# Patient Record
Sex: FEMALE | Race: WHITE | NOT HISPANIC OR LATINO | URBAN - METROPOLITAN AREA
[De-identification: names, ages, dates, MRNs, and addresses within clinical notes are randomized per-mention and may not be internally consistent; named-entity substitution may affect disease eponyms.]

---

## 2017-01-12 ENCOUNTER — APPOINTMENT (RX ONLY)
Dept: URBAN - METROPOLITAN AREA CLINIC 24 | Facility: CLINIC | Age: 82
Setting detail: DERMATOLOGY
End: 2017-01-12

## 2017-01-12 DIAGNOSIS — L57.0 ACTINIC KERATOSIS: ICD-10-CM

## 2017-01-12 PROCEDURE — ? LIQUID NITROGEN

## 2017-01-12 PROCEDURE — 17000 DESTRUCT PREMALG LESION: CPT

## 2017-01-12 ASSESSMENT — LOCATION ZONE DERM: LOCATION ZONE: EAR

## 2017-01-12 ASSESSMENT — LOCATION SIMPLE DESCRIPTION DERM: LOCATION SIMPLE: RIGHT EAR

## 2017-01-12 ASSESSMENT — LOCATION DETAILED DESCRIPTION DERM: LOCATION DETAILED: RIGHT INFERIOR HELIX

## 2017-01-12 NOTE — PROCEDURE: MIPS QUALITY
Detail Level: Detailed
Quality 110: Preventive Care And Screening: Influenza Immunization: Influenza Immunization previously received during influenza season
Quality 111:Pneumonia Vaccination Status For Older Adults: Pneumococcal Vaccination Previously Received
Quality 130: Documentation Of Current Medications In The Medical Record: Current Medications Documented

## 2017-01-17 ENCOUNTER — HOSPITAL ENCOUNTER (EMERGENCY)
Age: 82
Discharge: HOME | End: 2017-01-17
Attending: EMERGENCY MEDICINE
Payer: MEDICARE

## 2017-01-17 VITALS
HEART RATE: 50 BPM | DIASTOLIC BLOOD PRESSURE: 55 MMHG | OXYGEN SATURATION: 99 % | BODY MASS INDEX: 31.02 KG/M2 | RESPIRATION RATE: 16 BRPM | SYSTOLIC BLOOD PRESSURE: 111 MMHG | WEIGHT: 193 LBS | HEIGHT: 66 IN

## 2017-01-17 DIAGNOSIS — E87.6 HYPOKALEMIA: ICD-10-CM

## 2017-01-17 DIAGNOSIS — I48.0 PAROXYSMAL ATRIAL FIBRILLATION (HCC): ICD-10-CM

## 2017-01-17 DIAGNOSIS — R00.2 PALPITATIONS: Primary | ICD-10-CM

## 2017-01-17 LAB
ALBUMIN SERPL BCP-MCNC: 3.8 G/DL (ref 3.2–4.6)
ALBUMIN/GLOB SERPL: 1.2 {RATIO} (ref 1.2–3.5)
ALP SERPL-CCNC: 82 U/L (ref 50–136)
ALT SERPL-CCNC: 38 U/L (ref 12–65)
ANION GAP BLD CALC-SCNC: 6 MMOL/L (ref 7–16)
AST SERPL W P-5'-P-CCNC: 31 U/L (ref 15–37)
ATRIAL RATE: 58 BPM
BASOPHILS # BLD AUTO: 0 K/UL (ref 0–0.2)
BASOPHILS # BLD: 0 % (ref 0–2)
BILIRUB SERPL-MCNC: 0.7 MG/DL (ref 0.2–1.1)
BUN SERPL-MCNC: 18 MG/DL (ref 8–23)
CALCIUM SERPL-MCNC: 9.4 MG/DL (ref 8.3–10.4)
CALCULATED P AXIS, ECG09: 65 DEGREES
CALCULATED R AXIS, ECG10: 53 DEGREES
CALCULATED T AXIS, ECG11: 41 DEGREES
CHLORIDE SERPL-SCNC: 102 MMOL/L (ref 98–107)
CO2 SERPL-SCNC: 32 MMOL/L (ref 21–32)
CREAT SERPL-MCNC: 0.95 MG/DL (ref 0.6–1)
DIAGNOSIS, 93000: NORMAL
DIASTOLIC BP, ECG02: NORMAL MMHG
DIFFERENTIAL METHOD BLD: ABNORMAL
EOSINOPHIL # BLD: 0.2 K/UL (ref 0–0.8)
EOSINOPHIL NFR BLD: 4 % (ref 0.5–7.8)
ERYTHROCYTE [DISTWIDTH] IN BLOOD BY AUTOMATED COUNT: 14.4 % (ref 11.9–14.6)
GLOBULIN SER CALC-MCNC: 3.2 G/DL (ref 2.3–3.5)
GLUCOSE SERPL-MCNC: 89 MG/DL (ref 65–100)
HCT VFR BLD AUTO: 41.8 % (ref 35.8–46.3)
HGB BLD-MCNC: 13.8 G/DL (ref 11.7–15.4)
IMM GRANULOCYTES # BLD: 0 K/UL (ref 0–0.5)
IMM GRANULOCYTES NFR BLD AUTO: 0 % (ref 0–5)
INR PPP: 3 (ref 0.9–1.2)
LYMPHOCYTES # BLD AUTO: 24 % (ref 13–44)
LYMPHOCYTES # BLD: 1.2 K/UL (ref 0.5–4.6)
MAGNESIUM SERPL-MCNC: 1.7 MG/DL (ref 1.8–2.4)
MCH RBC QN AUTO: 29.3 PG (ref 26.1–32.9)
MCHC RBC AUTO-ENTMCNC: 33 G/DL (ref 31.4–35)
MCV RBC AUTO: 88.7 FL (ref 79.6–97.8)
MONOCYTES # BLD: 0.3 K/UL (ref 0.1–1.3)
MONOCYTES NFR BLD AUTO: 6 % (ref 4–12)
NEUTS SEG # BLD: 3.2 K/UL (ref 1.7–8.2)
NEUTS SEG NFR BLD AUTO: 66 % (ref 43–78)
P-R INTERVAL, ECG05: 122 MS
PLATELET # BLD AUTO: 176 K/UL (ref 150–450)
PMV BLD AUTO: 10.4 FL (ref 10.8–14.1)
POTASSIUM SERPL-SCNC: 3.4 MMOL/L (ref 3.5–5.1)
PROT SERPL-MCNC: 7 G/DL (ref 6.3–8.2)
PROTHROMBIN TIME: 31.5 SEC (ref 9.6–12)
Q-T INTERVAL, ECG07: 412 MS
QRS DURATION, ECG06: 94 MS
QTC CALCULATION (BEZET), ECG08: 404 MS
RBC # BLD AUTO: 4.71 M/UL (ref 4.05–5.25)
SODIUM SERPL-SCNC: 140 MMOL/L (ref 136–145)
SYSTOLIC BP, ECG01: NORMAL MMHG
TROPONIN I BLD-MCNC: 0 NG/ML (ref 0–0.08)
VENTRICULAR RATE, ECG03: 58 BPM
WBC # BLD AUTO: 4.9 K/UL (ref 4.3–11.1)

## 2017-01-17 PROCEDURE — 80053 COMPREHEN METABOLIC PANEL: CPT | Performed by: EMERGENCY MEDICINE

## 2017-01-17 PROCEDURE — 85025 COMPLETE CBC W/AUTO DIFF WBC: CPT | Performed by: EMERGENCY MEDICINE

## 2017-01-17 PROCEDURE — 99284 EMERGENCY DEPT VISIT MOD MDM: CPT | Performed by: EMERGENCY MEDICINE

## 2017-01-17 PROCEDURE — 84484 ASSAY OF TROPONIN QUANT: CPT

## 2017-01-17 PROCEDURE — 83735 ASSAY OF MAGNESIUM: CPT | Performed by: EMERGENCY MEDICINE

## 2017-01-17 PROCEDURE — 85610 PROTHROMBIN TIME: CPT | Performed by: EMERGENCY MEDICINE

## 2017-01-17 PROCEDURE — 93005 ELECTROCARDIOGRAM TRACING: CPT | Performed by: EMERGENCY MEDICINE

## 2017-01-17 PROCEDURE — 74011250637 HC RX REV CODE- 250/637: Performed by: EMERGENCY MEDICINE

## 2017-01-17 RX ORDER — POTASSIUM CHLORIDE 20MEQ/15ML
40 LIQUID (ML) ORAL
Status: COMPLETED | OUTPATIENT
Start: 2017-01-17 | End: 2017-01-17

## 2017-01-17 RX ORDER — LANOLIN ALCOHOL/MO/W.PET/CERES
400 CREAM (GRAM) TOPICAL ONCE
Status: COMPLETED | OUTPATIENT
Start: 2017-01-17 | End: 2017-01-17

## 2017-01-17 RX ADMIN — Medication 400 MG: at 07:17

## 2017-01-17 RX ADMIN — POTASSIUM CHLORIDE 40 MEQ: 20 SOLUTION ORAL at 07:15

## 2017-01-17 NOTE — ED PROVIDER NOTES
HPI Comments: 73-year-old female woke to go to the restroom for clot. Upon returning to bed she felt palpitations and irregular heartbeat. No chest pain no lightheadedness no vomiting no syncope. History of paroxysmal atrial fibrillation and this feels similar to past episodes. Last episode was probably 3 years ago. She has been maintained on Coumadin. Patient is a 80 y.o. female presenting with palpitations. The history is provided by the patient. Palpitations    This is a new problem. The current episode started 1 to 2 hours ago. The problem has been gradually improving. The problem occurs constantly. On average, each episode lasts 2 hours. Associated symptoms include irregular heartbeat. Pertinent negatives include no diaphoresis, no fever, no malaise/fatigue, no chest pain, no near-syncope, no syncope, no abdominal pain, no nausea, no vomiting, no headaches, no back pain, no weakness, no cough and no shortness of breath. Risk factors include hypertension. Past Medical History:   Diagnosis Date    Chest pain 10/30/2015    Dyspnea     Headache     HTN (hypertension), benign, controlled 10/30/2015    Malaise and fatigue     Paroxysmal atrial fibrillation ([facility] [address] 10/30/2015    Preoperative cardiovascular examination        Past Surgical History:   Procedure Laterality Date    Pr appendectomy      Hx hysterectomy      Hx breast reduction      Pr lap,cholecystectomy           Family History:   Problem Relation Age of Onset    Heart Attack Brother     Diabetes Mother     Cancer Mother      non hog    Diabetes Brother     Cancer Father      liver    Cancer Brother      prostate       Social History     Social History    Marital status:      Spouse name: N/A    Number of children: N/A    Years of education: N/A     Occupational History    Not on file.      Social History Main Topics    Smoking status: Former Smoker     Packs/day: 1.00     Years: 20.00    Smokeless tobacco: Not on file    Alcohol use No    Drug use: Not on file    Sexual activity: Not on file     Other Topics Concern    Not on file     Social History Narrative         ALLERGIES: Adhesive tape-silicones; Aloe vera; Ampicillin; Morphine; Penicillins; Reglan [metoclopramide]; Septra [sulfamethoprim]; and Sulfa (sulfonamide antibiotics)    Review of Systems   Constitutional: Negative for chills, diaphoresis, fever and malaise/fatigue. Respiratory: Negative for cough and shortness of breath. Cardiovascular: Positive for palpitations. Negative for chest pain, syncope and near-syncope. Gastrointestinal: Negative for abdominal pain, diarrhea, nausea and vomiting. Genitourinary: Negative for dysuria and flank pain. Musculoskeletal: Negative for back pain and neck pain. Skin: Negative for color change and rash. Neurological: Negative for syncope, weakness and headaches. All other systems reviewed and are negative. Vitals:    01/17/17 0604   BP: (!) 155/98   Pulse: 63   Resp: 18   SpO2: 97%   Weight: 87.5 kg (193 lb)   Height: 5' 6\" (1.676 m)            Physical Exam   Constitutional: She is oriented to person, place, and time. She appears well-developed and well-nourished. No distress. HENT:   Head: Normocephalic and atraumatic. Mouth/Throat: Oropharynx is clear and moist. No oropharyngeal exudate. Eyes: Conjunctivae and EOM are normal. Pupils are equal, round, and reactive to light. Neck: Normal range of motion. Neck supple. Cardiovascular: Normal rate, regular rhythm and intact distal pulses. No murmur heard. Pulmonary/Chest: Breath sounds normal. No respiratory distress. Abdominal: No hernia. Neurological: She is alert and oriented to person, place, and time. Gait normal.   Nl speech   Skin: Skin is warm and dry. Psychiatric: She has a normal mood and affect. Her speech is normal.   Nursing note and vitals reviewed.        MDM  Number of Diagnoses or Management Options  Diagnosis management comments: Assessment EKG for dysrhythmia. Assess electrolytes. Serum troponin. Check PT level since patient is on Coumadin. Amount and/or Complexity of Data Reviewed  Clinical lab tests: ordered and reviewed  Tests in the radiology section of CPT®: ordered and reviewed  Tests in the medicine section of CPT®: reviewed and ordered  Independent visualization of images, tracings, or specimens: yes    Risk of Complications, Morbidity, and/or Mortality  Presenting problems: moderate  Diagnostic procedures: low  Management options: moderate    Patient Progress  Patient progress: stable    ED Course       Procedures    EKG shows normal sinus rhythm ST-T changes or ectopy. Results Include:    Recent Results (from the past 24 hour(s))   EKG, 12 LEAD, INITIAL    Collection Time: 01/17/17  6:05 AM   Result Value Ref Range    Systolic BP  mmHg    Diastolic BP  mmHg    Ventricular Rate 58 BPM    Atrial Rate 58 BPM    P-R Interval 122 ms    QRS Duration 94 ms    Q-T Interval 412 ms    QTC Calculation (Bezet) 404 ms    Calculated P Axis 65 degrees    Calculated R Axis 53 degrees    Calculated T Axis 41 degrees    Diagnosis       Sinus bradycardia  Otherwise normal ECG  Confirmed by Yady Guerrero MD (), ROSEANNE MARTINEZ ([id number]) on 1/17/2017 6:55:55 AM     CBC WITH AUTOMATED DIFF    Collection Time: 01/17/17  6:12 AM   Result Value Ref Range    WBC 4.9 4.3 - 11.1 K/uL    RBC 4.71 4.05 - 5.25 M/uL    HGB 13.8 11.7 - 15.4 g/dL    HCT 41.8 35.8 - 46.3 %    MCV 88.7 79.6 - 97.8 FL    MCH 29.3 26.1 - 32.9 PG    MCHC 33.0 31.4 - 35.0 g/dL    RDW 14.4 11.9 - 14.6 %    PLATELET 657 468 - 818 K/uL    MPV 10.4 (L) 10.8 - 14.1 FL    DF AUTOMATED      NEUTROPHILS 66 43 - 78 %    LYMPHOCYTES 24 13 - 44 %    MONOCYTES 6 4.0 - 12.0 %    EOSINOPHILS 4 0.5 - 7.8 %    BASOPHILS 0 0.0 - 2.0 %    IMMATURE GRANULOCYTES 0.0 0.0 - 5.0 %    ABS. NEUTROPHILS 3.2 1.7 - 8.2 K/UL    ABS. LYMPHOCYTES 1.2 0.5 - 4.6 K/UL    ABS.  MONOCYTES 0.3 0.1 - 1.3 K/UL    ABS. EOSINOPHILS 0.2 0.0 - 0.8 K/UL    ABS. BASOPHILS 0.0 0.0 - 0.2 K/UL    ABS. IMM. GRANS. 0.0 0.0 - 0.5 K/UL   METABOLIC PANEL, COMPREHENSIVE    Collection Time: 01/17/17  6:12 AM   Result Value Ref Range    Sodium 140 136 - 145 mmol/L    Potassium 3.4 (L) 3.5 - 5.1 mmol/L    Chloride 102 98 - 107 mmol/L    CO2 32 21 - 32 mmol/L    Anion gap 6 (L) 7 - 16 mmol/L    Glucose 89 65 - 100 mg/dL    BUN 18 8 - 23 MG/DL    Creatinine 0.95 0.6 - 1.0 MG/DL    GFR est AA >60 >60 ml/min/1.73m2    GFR est non-AA >60 >60 ml/min/1.73m2    Calcium 9.4 8.3 - 10.4 MG/DL    Bilirubin, total 0.7 0.2 - 1.1 MG/DL    ALT 38 12 - 65 U/L    AST 31 15 - 37 U/L    Alk. phosphatase 82 50 - 136 U/L    Protein, total 7.0 6.3 - 8.2 g/dL    Albumin 3.8 3.2 - 4.6 g/dL    Globulin 3.2 2.3 - 3.5 g/dL    A-G Ratio 1.2 1.2 - 3.5     MAGNESIUM    Collection Time: 01/17/17  6:12 AM   Result Value Ref Range    Magnesium 1.7 (L) 1.8 - 2.4 mg/dL   PROTHROMBIN TIME + INR    Collection Time: 01/17/17  6:12 AM   Result Value Ref Range    Prothrombin time 31.5 (H) 9.6 - 12.0 sec    INR 3.0 (H) 0.9 - 1.2     POC TROPONIN-I    Collection Time: 01/17/17  6:41 AM   Result Value Ref Range    Troponin-I (POC) 0 0.0 - 0.08 ng/ml     No ectopy in the department.

## 2017-01-17 NOTE — ED TRIAGE NOTES
Pt presents to ER for sensation of irregular heart beat w/ rate increase to 109 at home since 0430 this am after getting up to the bathroom,  Pt denies SOB or CP, feels dizzy.

## 2017-01-17 NOTE — DISCHARGE INSTRUCTIONS
[facility]. Call for appointment if you do not hear from them in 2 days. Recheck sooner for worse palpitations/fever/vomiting/breathing. Results Include:    Recent Results (from the past 24 hour(s))   EKG, 12 LEAD, INITIAL    Collection Time: 01/17/17  6:05 AM   Result Value Ref Range    Systolic BP  mmHg    Diastolic BP  mmHg    Ventricular Rate 58 BPM    Atrial Rate 58 BPM    P-R Interval 122 ms    QRS Duration 94 ms    Q-T Interval 412 ms    QTC Calculation (Bezet) 404 ms    Calculated P Axis 65 degrees    Calculated R Axis 53 degrees    Calculated T Axis 41 degrees    Diagnosis       Sinus bradycardia  Otherwise normal ECG  Confirmed by Shyla Tillman MD (), ROSEANNE MARTINEZ ([id number]) on 1/17/2017 6:55:55 AM     CBC WITH AUTOMATED DIFF    Collection Time: 01/17/17  6:12 AM   Result Value Ref Range    WBC 4.9 4.3 - 11.1 K/uL    RBC 4.71 4.05 - 5.25 M/uL    HGB 13.8 11.7 - 15.4 g/dL    HCT 41.8 35.8 - 46.3 %    MCV 88.7 79.6 - 97.8 FL    MCH 29.3 26.1 - 32.9 PG    MCHC 33.0 31.4 - 35.0 g/dL    RDW 14.4 11.9 - 14.6 %    PLATELET 373 938 - 387 K/uL    MPV 10.4 (L) 10.8 - 14.1 FL    DF AUTOMATED      NEUTROPHILS 66 43 - 78 %    LYMPHOCYTES 24 13 - 44 %    MONOCYTES 6 4.0 - 12.0 %    EOSINOPHILS 4 0.5 - 7.8 %    BASOPHILS 0 0.0 - 2.0 %    IMMATURE GRANULOCYTES 0.0 0.0 - 5.0 %    ABS. NEUTROPHILS 3.2 1.7 - 8.2 K/UL    ABS. LYMPHOCYTES 1.2 0.5 - 4.6 K/UL    ABS. MONOCYTES 0.3 0.1 - 1.3 K/UL    ABS. EOSINOPHILS 0.2 0.0 - 0.8 K/UL    ABS. BASOPHILS 0.0 0.0 - 0.2 K/UL    ABS. IMM.  GRANS. 0.0 0.0 - 0.5 K/UL   METABOLIC PANEL, COMPREHENSIVE    Collection Time: 01/17/17  6:12 AM   Result Value Ref Range    Sodium 140 136 - 145 mmol/L    Potassium 3.4 (L) 3.5 - 5.1 mmol/L    Chloride 102 98 - 107 mmol/L    CO2 32 21 - 32 mmol/L    Anion gap 6 (L) 7 - 16 mmol/L    Glucose 89 65 - 100 mg/dL    BUN 18 8 - 23 MG/DL    Creatinine 0.95 0.6 - 1.0 MG/DL    GFR est AA >60 >60 ml/min/1.73m2    GFR est non-AA >60 >60 ml/min/1.73m2    Calcium 9.4 8.3 - 10.4 MG/DL    Bilirubin, total 0.7 0.2 - 1.1 MG/DL    ALT 38 12 - 65 U/L    AST 31 15 - 37 U/L    Alk. phosphatase 82 50 - 136 U/L    Protein, total 7.0 6.3 - 8.2 g/dL    Albumin 3.8 3.2 - 4.6 g/dL    Globulin 3.2 2.3 - 3.5 g/dL    A-G Ratio 1.2 1.2 - 3.5     MAGNESIUM    Collection Time: 01/17/17  6:12 AM   Result Value Ref Range    Magnesium 1.7 (L) 1.8 - 2.4 mg/dL   PROTHROMBIN TIME + INR    Collection Time: 01/17/17  6:12 AM   Result Value Ref Range    Prothrombin time 31.5 (H) 9.6 - 12.0 sec    INR 3.0 (H) 0.9 - 1.2     POC TROPONIN-I    Collection Time: 01/17/17  6:41 AM   Result Value Ref Range    Troponin-I (POC) 0 0.0 - 0.08 ng/ml            Palpitations: Care Instructions  Your Care Instructions    Heart palpitations are the uncomfortable sensation that your heart is beating fast or irregularly. You might feel pounding or fluttering in your chest. It might feel like your heart is skipping a beat. Although palpitations may be caused by a heart problem, they also occur because of stress, fatigue, or use of alcohol, caffeine, or nicotine. Many medicines, including diet pills, antihistamines, decongestants, and some herbal products, can cause heart palpitations. Nearly everyone has palpitations from time to time. Depending on your symptoms, your doctor may need to do more tests to try to find the cause of your palpitations. Follow-up care is a key part of your treatment and safety. Be sure to make and go to all appointments, and call your doctor if you are having problems. It's also a good idea to know your test results and keep a list of the medicines you take. How can you care for yourself at home? · Avoid caffeine, nicotine, and excess alcohol. · Do not take illegal drugs, such as methamphetamines and cocaine. · Do not take weight loss or diet medicines unless you talk with your doctor first.  · Get plenty of sleep. · Do not overeat.   · If you have palpitations again, take deep breaths and try to relax. This may slow a racing heart. · If you start to feel lightheaded, lie down to avoid injuries that might result if you pass out and fall down. · Keep a record of your palpitations and bring it to your next doctor's appointment. Write down:  ¨ The date and time. ¨ Your pulse. (If your heart is beating fast, it may be hard to count your pulse.)  ¨ What you were doing when the palpitations started. ¨ How long the palpitations lasted. ¨ Any other symptoms. · If an activity causes palpitations, slow down or stop. Talk to your doctor before you do that activity again. · Take your medicines exactly as prescribed. Call your doctor if you think you are having a problem with your medicine. When should you call for help? Call 911 anytime you think you may need emergency care. For example, call if:  · You passed out (lost consciousness). · You have symptoms of a heart attack. These may include:  ¨ Chest pain or pressure, or a strange feeling in the chest.  ¨ Sweating. ¨ Shortness of breath. ¨ Pain, pressure, or a strange feeling in the back, neck, jaw, or upper belly or in one or both shoulders or arms. ¨ Lightheadedness or sudden weakness. ¨ A fast or irregular heartbeat. After you call 911, the  may tell you to chew 1 adult-strength or 2 to 4 low-dose aspirin. Wait for an ambulance. Do not try to drive yourself. · You have symptoms of a stroke. These may include:  ¨ Sudden numbness, tingling, weakness, or loss of movement in your face, arm, or leg, especially on only one side of your body. ¨ Sudden vision changes. ¨ Sudden trouble speaking. ¨ Sudden confusion or trouble understanding simple statements. ¨ Sudden problems with walking or balance. ¨ A sudden, severe headache that is different from past headaches.   Call your doctor now or seek immediate medical care if:  · You have heart palpitations and:  ¨ Are dizzy or lightheaded, or you feel like you may faint.  ¨ Have new or increased shortness of breath. Watch closely for changes in your health, and be sure to contact your doctor if:  · You continue to have heart palpitations. Where can you learn more? Go to http://logan-pankaj.info/. Enter R508 in the search box to learn more about \"Palpitations: Care Instructions. \"  Current as of: January 27, 2016  Content Version: 11.1  © 8615-0721 [employer]. Care instructions adapted under license by [employer] (which disclaims liability or warranty for this information). If you have questions about a medical condition or this instruction, always ask your healthcare professional. [place] [address] any warranty or liability for your use of this information.

## 2017-02-07 PROBLEM — Z01.810 PREOP CARDIOVASCULAR EXAM: Status: ACTIVE | Noted: 2017-02-07

## 2017-05-18 ENCOUNTER — APPOINTMENT (RX ONLY)
Dept: URBAN - METROPOLITAN AREA CLINIC 24 | Facility: CLINIC | Age: 82
Setting detail: DERMATOLOGY
End: 2017-05-18

## 2017-05-18 DIAGNOSIS — Z87.2 PERSONAL HISTORY OF DISEASES OF THE SKIN AND SUBCUTANEOUS TISSUE: ICD-10-CM

## 2017-05-18 DIAGNOSIS — L81.4 OTHER MELANIN HYPERPIGMENTATION: ICD-10-CM

## 2017-05-18 DIAGNOSIS — L57.0 ACTINIC KERATOSIS: ICD-10-CM

## 2017-05-18 DIAGNOSIS — D18.0 HEMANGIOMA: ICD-10-CM

## 2017-05-18 PROBLEM — M12.9 ARTHROPATHY, UNSPECIFIED: Status: ACTIVE | Noted: 2017-05-18

## 2017-05-18 PROBLEM — E03.9 HYPOTHYROIDISM, UNSPECIFIED: Status: ACTIVE | Noted: 2017-05-18

## 2017-05-18 PROBLEM — D18.01 HEMANGIOMA OF SKIN AND SUBCUTANEOUS TISSUE: Status: ACTIVE | Noted: 2017-05-18

## 2017-05-18 PROBLEM — I10 ESSENTIAL (PRIMARY) HYPERTENSION: Status: ACTIVE | Noted: 2017-05-18

## 2017-05-18 PROCEDURE — 17000 DESTRUCT PREMALG LESION: CPT

## 2017-05-18 PROCEDURE — 99214 OFFICE O/P EST MOD 30 MIN: CPT | Mod: 25

## 2017-05-18 PROCEDURE — ? COUNSELING

## 2017-05-18 PROCEDURE — 17003 DESTRUCT PREMALG LES 2-14: CPT

## 2017-05-18 PROCEDURE — ? LIQUID NITROGEN

## 2017-05-18 ASSESSMENT — LOCATION ZONE DERM
LOCATION ZONE: TRUNK
LOCATION ZONE: NOSE
LOCATION ZONE: NECK
LOCATION ZONE: FACE
LOCATION ZONE: EAR
LOCATION ZONE: HAND

## 2017-05-18 ASSESSMENT — LOCATION DETAILED DESCRIPTION DERM
LOCATION DETAILED: LEFT NASAL DORSUM
LOCATION DETAILED: LEFT INFERIOR CENTRAL MALAR CHEEK
LOCATION DETAILED: LEFT SUPERIOR FOREHEAD
LOCATION DETAILED: LEFT RADIAL DORSAL HAND
LOCATION DETAILED: EPIGASTRIC SKIN
LOCATION DETAILED: RIGHT CLAVICULAR SKIN
LOCATION DETAILED: RIGHT SUPERIOR MEDIAL UPPER BACK
LOCATION DETAILED: RIGHT RADIAL DORSAL HAND
LOCATION DETAILED: RIGHT INFERIOR UPPER BACK
LOCATION DETAILED: UPPER STERNUM
LOCATION DETAILED: RIGHT SUPERIOR HELIX
LOCATION DETAILED: MID TRAPEZIAL NECK
LOCATION DETAILED: RIGHT SUPERIOR CENTRAL MALAR CHEEK

## 2017-05-18 ASSESSMENT — LOCATION SIMPLE DESCRIPTION DERM
LOCATION SIMPLE: LEFT CHEEK
LOCATION SIMPLE: NOSE
LOCATION SIMPLE: CHEST
LOCATION SIMPLE: ABDOMEN
LOCATION SIMPLE: RIGHT EAR
LOCATION SIMPLE: RIGHT HAND
LOCATION SIMPLE: TRAPEZIAL NECK
LOCATION SIMPLE: RIGHT CLAVICULAR SKIN
LOCATION SIMPLE: RIGHT UPPER BACK
LOCATION SIMPLE: LEFT FOREHEAD
LOCATION SIMPLE: RIGHT CHEEK
LOCATION SIMPLE: LEFT HAND

## 2017-08-09 PROBLEM — Z79.01 ANTICOAGULANT LONG-TERM USE: Status: ACTIVE | Noted: 2017-08-09

## 2017-10-24 ENCOUNTER — APPOINTMENT (RX ONLY)
Dept: URBAN - METROPOLITAN AREA CLINIC 24 | Facility: CLINIC | Age: 82
Setting detail: DERMATOLOGY
End: 2017-10-24

## 2017-10-24 DIAGNOSIS — D485 NEOPLASM OF UNCERTAIN BEHAVIOR OF SKIN: ICD-10-CM

## 2017-10-24 DIAGNOSIS — H61.03 CHONDRITIS OF EXTERNAL EAR: ICD-10-CM

## 2017-10-24 PROBLEM — D48.5 NEOPLASM OF UNCERTAIN BEHAVIOR OF SKIN: Status: ACTIVE | Noted: 2017-10-24

## 2017-10-24 PROBLEM — H61.032 CHONDRITIS OF LEFT EXTERNAL EAR: Status: ACTIVE | Noted: 2017-10-24

## 2017-10-24 PROBLEM — F41.9 ANXIETY DISORDER, UNSPECIFIED: Status: ACTIVE | Noted: 2017-10-24

## 2017-10-24 PROCEDURE — ? BIOPSY BY SHAVE METHOD

## 2017-10-24 PROCEDURE — 69100 BIOPSY OF EXTERNAL EAR: CPT

## 2017-10-24 PROCEDURE — ? COUNSELING

## 2017-10-24 PROCEDURE — 99212 OFFICE O/P EST SF 10 MIN: CPT | Mod: 25

## 2017-10-24 ASSESSMENT — LOCATION SIMPLE DESCRIPTION DERM
LOCATION SIMPLE: LEFT EAR
LOCATION SIMPLE: RIGHT EAR

## 2017-10-24 ASSESSMENT — LOCATION DETAILED DESCRIPTION DERM
LOCATION DETAILED: LEFT SUPERIOR HELIX
LOCATION DETAILED: RIGHT INFERIOR HELIX

## 2017-10-24 ASSESSMENT — LOCATION ZONE DERM: LOCATION ZONE: EAR

## 2017-10-24 NOTE — PROCEDURE: BIOPSY BY SHAVE METHOD
X Size Of Lesion In Cm: 0
Destruction After The Procedure: No
Hemostasis: Aluminum Chloride
Electrodesiccation And Curettage Text: The wound bed was treated with electrodesiccation and curettage after the biopsy was performed.
Biopsy Method: Dermablade
Anesthesia Volume In Cc: 0.3
Silver Nitrate Text: The wound bed was treated with silver nitrate after the biopsy was performed.
Cryotherapy Text: The wound bed was treated with cryotherapy after the biopsy was performed.
Consent: Written consent was obtained and risks were reviewed including but not limited to scarring, infection, bleeding, scabbing, incomplete removal, and allergy to anesthesia.
Type Of Destruction Used: Curettage
Detail Level: Detailed
Wound Care: Vaseline
Anesthesia Type: 1% lidocaine with epinephrine
Dressing: bandage
Notification Instructions: Patient will be notified of biopsy results. However, patient instructed to call the office if not contacted within 2 weeks.
Billing Type: Third-Party Bill
Post-care instructions were reviewed in detail and written instructions are provided. Patient is to keep the biopsy site dry overnight, and then apply bacitracin twice daily until healed. Patient may apply hydrogen peroxide soaks to remove any crusting.
Biopsy Type: H and E
Path Notes (To The Dermatopathologist): Excise if positive
Accession #: [id number]
Electrodesiccation Text: The wound bed was treated with electrodesiccation after the biopsy was performed.

## 2017-12-12 ENCOUNTER — APPOINTMENT (RX ONLY)
Dept: URBAN - METROPOLITAN AREA CLINIC 24 | Facility: CLINIC | Age: 82
Setting detail: DERMATOLOGY
End: 2017-12-12

## 2017-12-12 PROBLEM — C44.222 SQUAMOUS CELL CARCINOMA OF SKIN OF RIGHT EAR AND EXTERNAL AURICULAR CANAL: Status: ACTIVE | Noted: 2017-12-12

## 2017-12-12 PROCEDURE — 12051 INTMD RPR FACE/MM 2.5 CM/<: CPT

## 2017-12-12 PROCEDURE — ? EXCISION

## 2017-12-12 PROCEDURE — 11641 EXC F/E/E/N/L MAL+MRG 0.6-1: CPT

## 2017-12-12 NOTE — PROCEDURE: EXCISION
Interpolation Flap Text: A decision was made to reconstruct the defect utilizing an interpolation axial flap and a staged reconstruction.  A telfa template was made of the defect.  This telfa template was then used to outline the interpolation flap.  The donor area for the pedicle flap was then injected with anesthesia.  The flap was excised through the skin and subcutaneous tissue down to the layer of the underlying musculature.  The interpolation flap was carefully excised within this deep plane to maintain its blood supply.  The edges of the donor site were undermined.   The donor site was closed in a primary fashion.  The pedicle was then rotated into position and sutured.  Once the tube was sutured into place, adequate blood supply was confirmed with blanching and refill.  The pedicle was then wrapped with xeroform gauze and dressed appropriately with a telfa and gauze bandage to ensure continued blood supply and protect the attached pedicle.
Suture Removal: 7 days
Epidermal Closure: running
Size Of Margin In Cm: 0.2
Complex Repair And Xenograft Text: The defect edges were debeveled with a #15 scalpel blade.  The primary defect was closed partially with a complex linear closure.  Given the location of the defect, shape of the defect and the proximity to free margins an tissue cultured epidermal autograft was deemed most appropriate to repair the remaining defect.  The graft was trimmed to fit the size of the remaining defect.  The graft was then placed in the primary defect, oriented appropriately, and sutured into place.
Complex Repair And V-Y Plasty Text: The defect edges were debeveled with a #15 scalpel blade.  The primary defect was closed partially with a complex linear closure.  Given the location of the remaining defect, shape of the defect and the proximity to free margins a V-Y plasty was deemed most appropriate for complete closure of the defect.  Using a sterile surgical marker, an appropriate advancement flap was drawn incorporating the defect and placing the expected incisions within the relaxed skin tension lines where possible.    The area thus outlined was incised deep to adipose tissue with a #15 scalpel blade.  The skin margins were undermined to an appropriate distance in all directions utilizing iris scissors.
Complex Repair And O-L Flap Text: The defect edges were debeveled with a #15 scalpel blade.  The primary defect was closed partially with a complex linear closure.  Given the location of the remaining defect, shape of the defect and the proximity to free margins an O-L flap was deemed most appropriate for complete closure of the defect.  Using a sterile surgical marker, an appropriate flap was drawn incorporating the defect and placing the expected incisions within the relaxed skin tension lines where possible.    The area thus outlined was incised deep to adipose tissue with a #15 scalpel blade.  The skin margins were undermined to an appropriate distance in all directions utilizing iris scissors.
O-T Advancement Flap Text: The defect edges were debeveled with a #15 scalpel blade.  Given the location of the defect, shape of the defect and the proximity to free margins an O-T advancement flap was deemed most appropriate.  Using a sterile surgical marker, an appropriate advancement flap was drawn incorporating the defect and placing the expected incisions within the relaxed skin tension lines where possible.    The area thus outlined was incised deep to adipose tissue with a #15 scalpel blade.  The skin margins were undermined to an appropriate distance in all directions utilizing iris scissors.
Render Path Notes In Note?: no
H Plasty Text: Given the location of the defect, shape of the defect and the proximity to free margins a H-plasty was deemed most appropriate for repair.  Using a sterile surgical marker, the appropriate advancement arms of the H-plasty were drawn incorporating the defect and placing the expected incisions within the relaxed skin tension lines where possible. The area thus outlined was incised deep to adipose tissue with a #15 scalpel blade. The skin margins were undermined to an appropriate distance in all directions utilizing iris scissors.  The opposing advancement arms were then advanced into place in opposite direction and anchored with interrupted buried subcutaneous sutures.
Complex Repair Preamble Text (Leave Blank If You Do Not Want): Extensive wide undermining was performed.
Saucerization Excision Additional Text (Leave Blank If You Do Not Want): The margin was drawn around the clinically apparent lesion.  Incisions were then made along these lines, in a tangential fashion, to the appropriate tissue plane and the lesion was extirpated.
Mastoid Interpolation Flap Text: A decision was made to reconstruct the defect utilizing an interpolation axial flap and a staged reconstruction.  A telfa template was made of the defect.  This telfa template was then used to outline the mastoid interpolation flap.  The donor area for the pedicle flap was then injected with anesthesia.  The flap was excised through the skin and subcutaneous tissue down to the layer of the underlying musculature.  The pedicle flap was carefully excised within this deep plane to maintain its blood supply.  The edges of the donor site were undermined.   The donor site was closed in a primary fashion.  The pedicle was then rotated into position and sutured.  Once the tube was sutured into place, adequate blood supply was confirmed with blanching and refill.  The pedicle was then wrapped with xeroform gauze and dressed appropriately with a telfa and gauze bandage to ensure continued blood supply and protect the attached pedicle.
Paramedian Forehead Flap Text: A decision was made to reconstruct the defect utilizing an interpolation axial flap and a staged reconstruction.  A telfa template was made of the defect.  This telfa template was then used to outline the paramedian forehead pedicle flap.  The donor area for the pedicle flap was then injected with anesthesia.  The flap was excised through the skin and subcutaneous tissue down to the layer of the underlying musculature.  The pedicle flap was carefully excised within this deep plane to maintain its blood supply.  The edges of the donor site were undermined.   The donor site was closed in a primary fashion.  The pedicle was then rotated into position and sutured.  Once the tube was sutured into place, adequate blood supply was confirmed with blanching and refill.  The pedicle was then wrapped with xeroform gauze and dressed appropriately with a telfa and gauze bandage to ensure continued blood supply and protect the attached pedicle.
Complex Repair And Rotation Flap Text: The defect edges were debeveled with a #15 scalpel blade.  The primary defect was closed partially with a complex linear closure.  Given the location of the remaining defect, shape of the defect and the proximity to free margins a rotation flap was deemed most appropriate for complete closure of the defect.  Using a sterile surgical marker, an appropriate advancement flap was drawn incorporating the defect and placing the expected incisions within the relaxed skin tension lines where possible.    The area thus outlined was incised deep to adipose tissue with a #15 scalpel blade.  The skin margins were undermined to an appropriate distance in all directions utilizing iris scissors.
Additional Secondary Defect Length (In Cm): -
Show Curettage Variables: Yes
Complex Repair And Modified Advancement Flap Text: The defect edges were debeveled with a #15 scalpel blade.  The primary defect was closed partially with a complex linear closure.  Given the location of the remaining defect, shape of the defect and the proximity to free margins a modified advancement flap was deemed most appropriate for complete closure of the defect.  Using a sterile surgical marker, an appropriate advancement flap was drawn incorporating the defect and placing the expected incisions within the relaxed skin tension lines where possible.    The area thus outlined was incised deep to adipose tissue with a #15 scalpel blade.  The skin margins were undermined to an appropriate distance in all directions utilizing iris scissors.
Curvilinear Excision Additional Text (Leave Blank If You Do Not Want): The margin was drawn around the clinically apparent lesion.  A curvilinear shape was then drawn on the skin incorporating the lesion and margins.  Incisions were then made along these lines to the appropriate tissue plane and the lesion was extirpated.
Partial Purse String (Intermediate) Text: Given the location of the defect and the characteristics of the surrounding skin an intermediate purse string closure was deemed most appropriate.  Undermining was performed circumferentially around the surgical defect.  A purse string suture was then placed and tightened. Wound tension of the circular defect prevented complete closure of the wound.
Advancement Flap (Double) Text: The defect edges were debeveled with a #15 scalpel blade.  Given the location of the defect and the proximity to free margins a double advancement flap was deemed most appropriate.  Using a sterile surgical marker, the appropriate advancement flaps were drawn incorporating the defect and placing the expected incisions within the relaxed skin tension lines where possible.    The area thus outlined was incised deep to adipose tissue with a #15 scalpel blade.  The skin margins were undermined to an appropriate distance in all directions utilizing iris scissors.
Complex Repair And Dorsal Nasal Flap Text: The defect edges were debeveled with a #15 scalpel blade.  The primary defect was closed partially with a complex linear closure.  Given the location of the remaining defect, shape of the defect and the proximity to free margins a dorsal nasal flap was deemed most appropriate for complete closure of the defect.  Using a sterile surgical marker, an appropriate flap was drawn incorporating the defect and placing the expected incisions within the relaxed skin tension lines where possible.    The area thus outlined was incised deep to adipose tissue with a #15 scalpel blade.  The skin margins were undermined to an appropriate distance in all directions utilizing iris scissors.
Epidermal Closure Graft Donor Site (Optional): simple interrupted
Elliptical Excision Additional Text (Leave Blank If You Do Not Want): The margin was drawn around the clinically apparent lesion.  An elliptical shape was then drawn on the skin incorporating the lesion and margins.  Incisions were then made along these lines to the appropriate tissue plane and the lesion was extirpated.
Island Pedicle Flap Text: The defect edges were debeveled with a #15 scalpel blade.  Given the location of the defect, shape of the defect and the proximity to free margins an island pedicle advancement flap was deemed most appropriate.  Using a sterile surgical marker, an appropriate advancement flap was drawn incorporating the defect, outlining the appropriate donor tissue and placing the expected incisions within the relaxed skin tension lines where possible.    The area thus outlined was incised deep to adipose tissue with a #15 scalpel blade.  The skin margins were undermined to an appropriate distance in all directions around the primary defect and laterally outward around the island pedicle utilizing iris scissors.  There was minimal undermining beneath the pedicle flap.
Tissue Cultured Epidermal Autograft Text: The defect edges were debeveled with a #15 scalpel blade.  Given the location of the defect, shape of the defect and the proximity to free margins a tissue cultured epidermal autograft was deemed most appropriate.  The graft was then trimmed to fit the size of the defect.  The graft was then placed in the primary defect and oriented appropriately.
Complex Repair And Dermal Autograft Text: The defect edges were debeveled with a #15 scalpel blade.  The primary defect was closed partially with a complex linear closure.  Given the location of the defect, shape of the defect and the proximity to free margins an dermal autograft was deemed most appropriate to repair the remaining defect.  The graft was trimmed to fit the size of the remaining defect.  The graft was then placed in the primary defect, oriented appropriately, and sutured into place.
Cheek Interpolation Flap Text: A decision was made to reconstruct the defect utilizing an interpolation axial flap and a staged reconstruction.  A telfa template was made of the defect.  This telfa template was then used to outline the Cheek Interpolation flap.  The donor area for the pedicle flap was then injected with anesthesia.  The flap was excised through the skin and subcutaneous tissue down to the layer of the underlying musculature.  The interpolation flap was carefully excised within this deep plane to maintain its blood supply.  The edges of the donor site were undermined.   The donor site was closed in a primary fashion.  The pedicle was then rotated into position and sutured.  Once the tube was sutured into place, adequate blood supply was confirmed with blanching and refill.  The pedicle was then wrapped with xeroform gauze and dressed appropriately with a telfa and gauze bandage to ensure continued blood supply and protect the attached pedicle.
Positioning (Leave Blank If You Do Not Want): The patient was placed in a comfortable position exposing the surgical site.
Muscle Hinge Flap Text: The defect edges were debeveled with a #15 scalpel blade.  Given the size, depth and location of the defect and the proximity to free margins a muscle hinge flap was deemed most appropriate.  Using a sterile surgical marker, an appropriate hinge flap was drawn incorporating the defect. The area thus outlined was incised with a #15 scalpel blade.  The skin margins were undermined to an appropriate distance in all directions utilizing iris scissors.
Transposition Flap Text: The defect edges were debeveled with a #15 scalpel blade.  Given the location of the defect and the proximity to free margins a transposition flap was deemed most appropriate.  Using a sterile surgical marker, an appropriate transposition flap was drawn incorporating the defect.    The area thus outlined was incised deep to adipose tissue with a #15 scalpel blade.  The skin margins were undermined to an appropriate distance in all directions utilizing iris scissors.
Billing Type: Third-Party Bill
A-T Advancement Flap Text: The defect edges were debeveled with a #15 scalpel blade.  Given the location of the defect, shape of the defect and the proximity to free margins an A-T advancement flap was deemed most appropriate.  Using a sterile surgical marker, an appropriate advancement flap was drawn incorporating the defect and placing the expected incisions within the relaxed skin tension lines where possible.    The area thus outlined was incised deep to adipose tissue with a #15 scalpel blade.  The skin margins were undermined to an appropriate distance in all directions utilizing iris scissors.
Partial Purse String (Simple) Text: Given the location of the defect and the characteristics of the surrounding skin a simple purse string closure was deemed most appropriate.  Undermining was performed circumferentially around the surgical defect.  A purse string suture was then placed and tightened. Wound tension of the circular defect prevented complete closure of the wound.
Hemostasis: Electrocautery
Post-Care Instructions: I reviewed with the patient in detail post-care instructions. Patient is not to engage in any heavy lifting, exercise, or swimming for the next 7 days. Should the patient develop any fevers, chills, bleeding, severe pain patient will contact the office immediately.
X Size Of Lesion In Cm (Optional): 0
Repair Type: Intermediate
Graft Donor Site Bandage (Optional-Leave Blank If You Don't Want In Note): Steri-strips and a pressure bandage were applied to the donor site.
Complex Repair And Single Advancement Flap Text: The defect edges were debeveled with a #15 scalpel blade.  The primary defect was closed partially with a complex linear closure.  Given the location of the remaining defect, shape of the defect and the proximity to free margins a single advancement flap was deemed most appropriate for complete closure of the defect.  Using a sterile surgical marker, an appropriate advancement flap was drawn incorporating the defect and placing the expected incisions within the relaxed skin tension lines where possible.    The area thus outlined was incised deep to adipose tissue with a #15 scalpel blade.  The skin margins were undermined to an appropriate distance in all directions utilizing iris scissors.
Modified Advancement Flap Text: The defect edges were debeveled with a #15 scalpel blade.  Given the location of the defect, shape of the defect and the proximity to free margins a modified advancement flap was deemed most appropriate.  Using a sterile surgical marker, an appropriate advancement flap was drawn incorporating the defect and placing the expected incisions within the relaxed skin tension lines where possible.    The area thus outlined was incised deep to adipose tissue with a #15 scalpel blade.  The skin margins were undermined to an appropriate distance in all directions utilizing iris scissors.
Lip Wedge Excision Repair Text: Given the location of the defect and the proximity to free margins a full thickness wedge repair was deemed most appropriate.  Using a sterile surgical marker, the appropriate repair was drawn incorporating the defect and placing the expected incisions perpendicular to the vermillion border.  The vermillion border was also meticulously outlined to ensure appropriate reapproximation during the repair.  The area thus outlined was incised through and through with a #15 scalpel blade.  The muscularis and dermis were reaproximated with deep sutures following hemostasis. Care was taken to realign the vermillion border before proceeding with the superficial closure.  Once the vermillion was realigned the superfical and mucosal closure was finished.
Estimated Blood Loss (Cc): minimal
Fusiform Excision Additional Text (Leave Blank If You Do Not Want): The margin was drawn around the clinically apparent lesion.  A fusiform shape was then drawn on the skin incorporating the lesion and margins.  Incisions were then made along these lines to the appropriate tissue plane and the lesion was extirpated.
Helical Rim Advancement Flap Text: The defect edges were debeveled with a #15 blade scalpel.  Given the location of the defect and the proximity to free margins (helical rim) a double helical rim advancement flap was deemed most appropriate.  Using a sterile surgical marker, the appropriate advancement flaps were drawn incorporating the defect and placing the expected incisions between the helical rim and antihelix where possible.  The area thus outlined was incised through and through with a #15 scalpel blade.  With a skin hook and iris scissors, the flaps were gently and sharply undermined and freed up.
Complex Repair And Epidermal Autograft Text: The defect edges were debeveled with a #15 scalpel blade.  The primary defect was closed partially with a complex linear closure.  Given the location of the defect, shape of the defect and the proximity to free margins an epidermal autograft was deemed most appropriate to repair the remaining defect.  The graft was trimmed to fit the size of the remaining defect.  The graft was then placed in the primary defect, oriented appropriately, and sutured into place.
Melolabial Transposition Flap Text: The defect edges were debeveled with a #15 scalpel blade.  Given the location of the defect and the proximity to free margins a melolabial flap was deemed most appropriate.  Using a sterile surgical marker, an appropriate melolabial transposition flap was drawn incorporating the defect.    The area thus outlined was incised deep to adipose tissue with a #15 scalpel blade.  The skin margins were undermined to an appropriate distance in all directions utilizing iris scissors.
Complex Repair And Skin Substitute Graft Text: The defect edges were debeveled with a #15 scalpel blade.  The primary defect was closed partially with a complex linear closure.  Given the location of the remaining defect, shape of the defect and the proximity to free margins a skin substitute graft was deemed most appropriate to repair the remaining defect.  The graft was trimmed to fit the size of the remaining defect.  The graft was then placed in the primary defect, oriented appropriately, and sutured into place.
Complex Repair And Transposition Flap Text: The defect edges were debeveled with a #15 scalpel blade.  The primary defect was closed partially with a complex linear closure.  Given the location of the remaining defect, shape of the defect and the proximity to free margins a transposition flap was deemed most appropriate for complete closure of the defect.  Using a sterile surgical marker, an appropriate advancement flap was drawn incorporating the defect and placing the expected incisions within the relaxed skin tension lines where possible.    The area thus outlined was incised deep to adipose tissue with a #15 scalpel blade.  The skin margins were undermined to an appropriate distance in all directions utilizing iris scissors.
Complex Repair And Split-Thickness Skin Graft Text: The defect edges were debeveled with a #15 scalpel blade.  The primary defect was closed partially with a complex linear closure.  Given the location of the defect, shape of the defect and the proximity to free margins a split thickness skin graft was deemed most appropriate to repair the remaining defect.  The graft was trimmed to fit the size of the remaining defect.  The graft was then placed in the primary defect, oriented appropriately, and sutured into place.
Purse String (Intermediate) Text: Given the location of the defect and the characteristics of the surrounding skin a pursestring intermediate closure was deemed most appropriate.  Undermining was performed circumfirentially around the surgical defect.  A purstring suture was then placed and tightened.
Intermediate / Complex Repair - Final Wound Length In Cm: 1.6
Keystone Flap Text: The defect edges were debeveled with a #15 scalpel blade.  Given the location of the defect, shape of the defect a keystone flap was deemed most appropriate.  Using a sterile surgical marker, an appropriate keystone flap was drawn incorporating the defect, outlining the appropriate donor tissue and placing the expected incisions within the relaxed skin tension lines where possible. The area thus outlined was incised deep to adipose tissue with a #15 scalpel blade.  The skin margins were undermined to an appropriate distance in all directions around the primary defect and laterally outward around the flap utilizing iris scissors.
Xenograft Text: The defect edges were debeveled with a #15 scalpel blade.  Given the location of the defect, shape of the defect and the proximity to free margins a xenograft was deemed most appropriate.  The graft was then trimmed to fit the size of the defect.  The graft was then placed in the primary defect and oriented appropriately.
Ftsg Text: The defect edges were debeveled with a #15 scalpel blade.  Given the location of the defect, shape of the defect and the proximity to free margins a full thickness skin graft was deemed most appropriate.  Using a sterile surgical marker, the primary defect shape was transferred to the donor site. The area thus outlined was incised deep to adipose tissue with a #15 scalpel blade.  The harvested graft was then trimmed of adipose tissue until only dermis and epidermis was left.  The skin margins of the secondary defect were undermined to an appropriate distance in all directions utilizing iris scissors.  The secondary defect was closed with interrupted buried subcutaneous sutures.  The skin edges were then re-apposed with running  sutures.  The skin graft was then placed in the primary defect and oriented appropriately.
Hatchet Flap Text: The defect edges were debeveled with a #15 scalpel blade.  Given the location of the defect, shape of the defect and the proximity to free margins a hatchet flap was deemed most appropriate.  Using a sterile surgical marker, an appropriate hatchet flap was drawn incorporating the defect and placing the expected incisions within the relaxed skin tension lines where possible.    The area thus outlined was incised deep to adipose tissue with a #15 scalpel blade.  The skin margins were undermined to an appropriate distance in all directions utilizing iris scissors.
Z Plasty Text: The lesion was extirpated to the level of the fat with a #15 scalpel blade.  Given the location of the defect, shape of the defect and the proximity to free margins a Z-plasty was deemed most appropriate for repair.  Using a sterile surgical marker, the appropriate transposition arms of the Z-plasty were drawn incorporating the defect and placing the expected incisions within the relaxed skin tension lines where possible.    The area thus outlined was incised deep to adipose tissue with a #15 scalpel blade.  The skin margins were undermined to an appropriate distance in all directions utilizing iris scissors.  The opposing transposition arms were then transposed into place in opposite direction and anchored with interrupted buried subcutaneous sutures.
Island Pedicle Flap-Requiring Vessel Identification Text: The defect edges were debeveled with a #15 scalpel blade.  Given the location of the defect, shape of the defect and the proximity to free margins an island pedicle advancement flap was deemed most appropriate.  Using a sterile surgical marker, an appropriate advancement flap was drawn, based on the axial vessel mentioned above, incorporating the defect, outlining the appropriate donor tissue and placing the expected incisions within the relaxed skin tension lines where possible.    The area thus outlined was incised deep to adipose tissue with a #15 scalpel blade.  The skin margins were undermined to an appropriate distance in all directions around the primary defect and laterally outward around the island pedicle utilizing iris scissors.  There was minimal undermining beneath the pedicle flap.
Cheek-To-Nose Interpolation Flap Text: A decision was made to reconstruct the defect utilizing an interpolation axial flap and a staged reconstruction.  A telfa template was made of the defect.  This telfa template was then used to outline the Cheek-To-Nose Interpolation flap.  The donor area for the pedicle flap was then injected with anesthesia.  The flap was excised through the skin and subcutaneous tissue down to the layer of the underlying musculature.  The interpolation flap was carefully excised within this deep plane to maintain its blood supply.  The edges of the donor site were undermined.   The donor site was closed in a primary fashion.  The pedicle was then rotated into position and sutured.  Once the tube was sutured into place, adequate blood supply was confirmed with blanching and refill.  The pedicle was then wrapped with xeroform gauze and dressed appropriately with a telfa and gauze bandage to ensure continued blood supply and protect the attached pedicle.
Bilobed Flap Text: The defect edges were debeveled with a #15 scalpel blade.  Given the location of the defect and the proximity to free margins a bilobe flap was deemed most appropriate.  Using a sterile surgical marker, an appropriate bilobe flap drawn around the defect.    The area thus outlined was incised deep to adipose tissue with a #15 scalpel blade.  The skin margins were undermined to an appropriate distance in all directions utilizing iris scissors.
Advancement Flap (Single) Text: The defect edges were debeveled with a #15 scalpel blade.  Given the location of the defect and the proximity to free margins a single advancement flap was deemed most appropriate.  Using a sterile surgical marker, an appropriate advancement flap was drawn incorporating the defect and placing the expected incisions within the relaxed skin tension lines where possible.    The area thus outlined was incised deep to adipose tissue with a #15 scalpel blade.  The skin margins were undermined to an appropriate distance in all directions utilizing iris scissors.
Spiral Flap Text: The defect edges were debeveled with a #15 scalpel blade.  Given the location of the defect, shape of the defect and the proximity to free margins a spiral flap was deemed most appropriate.  Using a sterile surgical marker, an appropriate rotation flap was drawn incorporating the defect and placing the expected incisions within the relaxed skin tension lines where possible. The area thus outlined was incised deep to adipose tissue with a #15 scalpel blade.  The skin margins were undermined to an appropriate distance in all directions utilizing iris scissors.
Consent was obtained from the patient. The risks and benefits to therapy were discussed in detail. Specifically, the risks of infection, scarring, bleeding, prolonged wound healing, incomplete removal, allergy to anesthesia, nerve injury and recurrence were addressed. Prior to the procedure, the treatment site was clearly identified and confirmed by the patient. All components of Universal Protocol/PAUSE Rule completed.
Alar Island Pedicle Flap Text: The defect edges were debeveled with a #15 scalpel blade.  Given the location of the defect, shape of the defect and the proximity to the alar rim an island pedicle advancement flap was deemed most appropriate.  Using a sterile surgical marker, an appropriate advancement flap was drawn incorporating the defect, outlining the appropriate donor tissue and placing the expected incisions within the nasal ala running parallel to the alar rim. The area thus outlined was incised with a #15 scalpel blade.  The skin margins were undermined minimally to an appropriate distance in all directions around the primary defect and laterally outward around the island pedicle utilizing iris scissors.  There was minimal undermining beneath the pedicle flap.
Scalpel Size: 15C blade
Composite Graft Text: The defect edges were debeveled with a #15 scalpel blade.  Given the location of the defect, shape of the defect, the proximity to free margins and the fact the defect was full thickness a composite graft was deemed most appropriate.  The defect was outline and then transferred to the donor site.  A full thickness graft was then excised from the donor site. The graft was then placed in the primary defect, oriented appropriately and then sutured into place.  The secondary defect was then repaired using a primary closure.
Anesthesia Volume In Cc: 5
Intermediate Repair Preamble Text (Leave Blank If You Do Not Want): Undermining was performed with blunt dissection.
Burow's Advancement Flap Text: The defect edges were debeveled with a #15 scalpel blade.  Given the location of the defect and the proximity to free margins a Burow's advancement flap was deemed most appropriate.  Using a sterile surgical marker, the appropriate advancement flap was drawn incorporating the defect and placing the expected incisions within the relaxed skin tension lines where possible.    The area thus outlined was incised deep to adipose tissue with a #15 scalpel blade.  The skin margins were undermined to an appropriate distance in all directions utilizing iris scissors.
Bilateral Helical Rim Advancement Flap Text: The defect edges were debeveled with a #15 blade scalpel.  Given the location of the defect and the proximity to free margins (helical rim) a bilateral helical rim advancement flap was deemed most appropriate.  Using a sterile surgical marker, the appropriate advancement flaps were drawn incorporating the defect and placing the expected incisions between the helical rim and antihelix where possible.  The area thus outlined was incised through and through with a #15 scalpel blade.  With a skin hook and iris scissors, the flaps were gently and sharply undermined and freed up.
Dorsal Nasal Flap Text: The defect edges were debeveled with a #15 scalpel blade.  Given the location of the defect and the proximity to free margins a dorsal nasal flap was deemed most appropriate.  Using a sterile surgical marker, an appropriate dorsal nasal flap was drawn around the defect.    The area thus outlined was incised deep to adipose tissue with a #15 scalpel blade.  The skin margins were undermined to an appropriate distance in all directions utilizing iris scissors.
Slit Excision Additional Text (Leave Blank If You Do Not Want): A linear line was drawn on the skin overlying the lesion. An incision was made slowly until the lesion was visualized.  Once visualized, the lesion was removed with blunt dissection.
Epidermal Sutures: 5-0 Prolene
No Repair - Repaired With Adjacent Surgical Defect Text (Leave Blank If You Do Not Want): After the excision the defect was repaired concurrently with another surgical defect which was in close approximation.
Home Suture Removal Text: Patient was provided a home suture removal kit and will remove their sutures at home.  If they have any questions or difficulties they will call the office.
V-Y Flap Text: The defect edges were debeveled with a #15 scalpel blade.  Given the location of the defect, shape of the defect and the proximity to free margins a V-Y flap was deemed most appropriate.  Using a sterile surgical marker, an appropriate advancement flap was drawn incorporating the defect and placing the expected incisions within the relaxed skin tension lines where possible.    The area thus outlined was incised deep to adipose tissue with a #15 scalpel blade.  The skin margins were undermined to an appropriate distance in all directions utilizing iris scissors.
Cartilage Graft Text: The defect edges were debeveled with a #15 scalpel blade.  Given the location of the defect, shape of the defect, the fact the defect involved a full thickness cartilage defect a cartilage graft was deemed most appropriate.  An appropriate donor site was identified, cleansed, and anesthetized. The cartilage graft was then harvested and transferred to the recipient site, oriented appropriately and then sutured into place.  The secondary defect was then repaired using a primary closure.
Detail Level: Detailed
Size Of Lesion In Cm: 0.4
Ear Star Wedge Flap Text: The defect edges were debeveled with a #15 blade scalpel.  Given the location of the defect and the proximity to free margins (helical rim) an ear star wedge flap was deemed most appropriate.  Using a sterile surgical marker, the appropriate flap was drawn incorporating the defect and placing the expected incisions between the helical rim and antihelix where possible.  The area thus outlined was incised through and through with a #15 scalpel blade.
Melolabial Interpolation Flap Text: A decision was made to reconstruct the defect utilizing an interpolation axial flap and a staged reconstruction.  A telfa template was made of the defect.  This telfa template was then used to outline the melolabial interpolation flap.  The donor area for the pedicle flap was then injected with anesthesia.  The flap was excised through the skin and subcutaneous tissue down to the layer of the underlying musculature.  The pedicle flap was carefully excised within this deep plane to maintain its blood supply.  The edges of the donor site were undermined.   The donor site was closed in a primary fashion.  The pedicle was then rotated into position and sutured.  Once the tube was sutured into place, adequate blood supply was confirmed with blanching and refill.  The pedicle was then wrapped with xeroform gauze and dressed appropriately with a telfa and gauze bandage to ensure continued blood supply and protect the attached pedicle.
Complex Repair And Double Advancement Flap Text: The defect edges were debeveled with a #15 scalpel blade.  The primary defect was closed partially with a complex linear closure.  Given the location of the remaining defect, shape of the defect and the proximity to free margins a double advancement flap was deemed most appropriate for complete closure of the defect.  Using a sterile surgical marker, an appropriate advancement flap was drawn incorporating the defect and placing the expected incisions within the relaxed skin tension lines where possible.    The area thus outlined was incised deep to adipose tissue with a #15 scalpel blade.  The skin margins were undermined to an appropriate distance in all directions utilizing iris scissors.
Double Island Pedicle Flap Text: The defect edges were debeveled with a #15 scalpel blade.  Given the location of the defect, shape of the defect and the proximity to free margins a double island pedicle advancement flap was deemed most appropriate.  Using a sterile surgical marker, an appropriate advancement flap was drawn incorporating the defect, outlining the appropriate donor tissue and placing the expected incisions within the relaxed skin tension lines where possible.    The area thus outlined was incised deep to adipose tissue with a #15 scalpel blade.  The skin margins were undermined to an appropriate distance in all directions around the primary defect and laterally outward around the island pedicle utilizing iris scissors.  There was minimal undermining beneath the pedicle flap.
Rotation Flap Text: The defect edges were debeveled with a #15 scalpel blade.  Given the location of the defect, shape of the defect and the proximity to free margins a rotation flap was deemed most appropriate.  Using a sterile surgical marker, an appropriate rotation flap was drawn incorporating the defect and placing the expected incisions within the relaxed skin tension lines where possible.    The area thus outlined was incised deep to adipose tissue with a #15 scalpel blade.  The skin margins were undermined to an appropriate distance in all directions utilizing iris scissors.
S Plasty Text: Given the location and shape of the defect, and the orientation of relaxed skin tension lines, an S-plasty was deemed most appropriate for repair.  Using a sterile surgical marker, the appropriate outline of the S-plasty was drawn, incorporating the defect and placing the expected incisions within the relaxed skin tension lines where possible.  The area thus outlined was incised deep to adipose tissue with a #15 scalpel blade.  The skin margins were undermined to an appropriate distance in all directions utilizing iris scissors. The skin flaps were advanced over the defect.  The opposing margins were then approximated with interrupted buried subcutaneous sutures.
O-T Plasty Text: The defect edges were debeveled with a #15 scalpel blade.  Given the location of the defect, shape of the defect and the proximity to free margins an O-T plasty was deemed most appropriate.  Using a sterile surgical marker, an appropriate O-T plasty was drawn incorporating the defect and placing the expected incisions within the relaxed skin tension lines where possible.    The area thus outlined was incised deep to adipose tissue with a #15 scalpel blade.  The skin margins were undermined to an appropriate distance in all directions utilizing iris scissors.
Advancement-Rotation Flap Text: The defect edges were debeveled with a #15 scalpel blade.  Given the location of the defect, shape of the defect and the proximity to free margins an advancement-rotation flap was deemed most appropriate.  Using a sterile surgical marker, an appropriate flap was drawn incorporating the defect and placing the expected incisions within the relaxed skin tension lines where possible. The area thus outlined was incised deep to adipose tissue with a #15 scalpel blade.  The skin margins were undermined to an appropriate distance in all directions utilizing iris scissors.
Mucosal Advancement Flap Text: Given the location of the defect, shape of the defect and the proximity to free margins a mucosal advancement flap was deemed most appropriate. Incisions were made with a 15 blade scalpel in the appropriate fashion along the cutaneous vermillion border and the mucosal lip. The remaining actinically damaged mucosal tissue was excised.  The mucosal advancement flap was then elevated to the gingival sulcus with care taken to preserve the neurovascular structures and advanced into the primary defect. Care was taken to ensure that precise realignment of the vermillion border was achieved.
Crescentic Advancement Flap Text: The defect edges were debeveled with a #15 scalpel blade.  Given the location of the defect and the proximity to free margins a crescentic advancement flap was deemed most appropriate.  Using a sterile surgical marker, the appropriate advancement flap was drawn incorporating the defect and placing the expected incisions within the relaxed skin tension lines where possible.    The area thus outlined was incised deep to adipose tissue with a #15 scalpel blade.  The skin margins were undermined to an appropriate distance in all directions utilizing iris scissors.
Dressing: pressure dressing
Wound Care: Vaseline
Path Notes (To The Dermatopathologist): Please check margins
Accession #: [id number]
Complex Repair And Melolabial Flap Text: The defect edges were debeveled with a #15 scalpel blade.  The primary defect was closed partially with a complex linear closure.  Given the location of the remaining defect, shape of the defect and the proximity to free margins a melolabial flap was deemed most appropriate for complete closure of the defect.  Using a sterile surgical marker, an appropriate advancement flap was drawn incorporating the defect and placing the expected incisions within the relaxed skin tension lines where possible.    The area thus outlined was incised deep to adipose tissue with a #15 scalpel blade.  The skin margins were undermined to an appropriate distance in all directions utilizing iris scissors.
Skin Substitute Text: The defect edges were debeveled with a #15 scalpel blade.  Given the location of the defect, shape of the defect and the proximity to free margins a skin substitute graft was deemed most appropriate.  The graft material was trimmed to fit the size of the defect. The graft was then placed in the primary defect and oriented appropriately.
Complex Repair And Z Plasty Text: The defect edges were debeveled with a #15 scalpel blade.  The primary defect was closed partially with a complex linear closure.  Given the location of the remaining defect, shape of the defect and the proximity to free margins a Z plasty was deemed most appropriate for complete closure of the defect.  Using a sterile surgical marker, an appropriate advancement flap was drawn incorporating the defect and placing the expected incisions within the relaxed skin tension lines where possible.    The area thus outlined was incised deep to adipose tissue with a #15 scalpel blade.  The skin margins were undermined to an appropriate distance in all directions utilizing iris scissors.
Complex Repair And Bilobe Flap Text: The defect edges were debeveled with a #15 scalpel blade.  The primary defect was closed partially with a complex linear closure.  Given the location of the remaining defect, shape of the defect and the proximity to free margins a bilobe flap was deemed most appropriate for complete closure of the defect.  Using a sterile surgical marker, an appropriate advancement flap was drawn incorporating the defect and placing the expected incisions within the relaxed skin tension lines where possible.    The area thus outlined was incised deep to adipose tissue with a #15 scalpel blade.  The skin margins were undermined to an appropriate distance in all directions utilizing iris scissors.
O-L Flap Text: The defect edges were debeveled with a #15 scalpel blade.  Given the location of the defect, shape of the defect and the proximity to free margins an O-L flap was deemed most appropriate.  Using a sterile surgical marker, an appropriate advancement flap was drawn incorporating the defect and placing the expected incisions within the relaxed skin tension lines where possible.    The area thus outlined was incised deep to adipose tissue with a #15 scalpel blade.  The skin margins were undermined to an appropriate distance in all directions utilizing iris scissors.
Complex Repair And Rhombic Flap Text: The defect edges were debeveled with a #15 scalpel blade.  The primary defect was closed partially with a complex linear closure.  Given the location of the remaining defect, shape of the defect and the proximity to free margins a rhombic flap was deemed most appropriate for complete closure of the defect.  Using a sterile surgical marker, an appropriate advancement flap was drawn incorporating the defect and placing the expected incisions within the relaxed skin tension lines where possible.    The area thus outlined was incised deep to adipose tissue with a #15 scalpel blade.  The skin margins were undermined to an appropriate distance in all directions utilizing iris scissors.
O-Z Plasty Text: The defect edges were debeveled with a #15 scalpel blade.  Given the location of the defect, shape of the defect and the proximity to free margins an O-Z plasty (double transposition flap) was deemed most appropriate.  Using a sterile surgical marker, the appropriate transposition flaps were drawn incorporating the defect and placing the expected incisions within the relaxed skin tension lines where possible.    The area thus outlined was incised deep to adipose tissue with a #15 scalpel blade.  The skin margins were undermined to an appropriate distance in all directions utilizing iris scissors.  Hemostasis was achieved with electrocautery.  The flaps were then transposed into place, one clockwise and the other counterclockwise, and anchored with interrupted buried subcutaneous sutures.
V-Y Plasty Text: The defect edges were debeveled with a #15 scalpel blade.  Given the location of the defect, shape of the defect and the proximity to free margins an V-Y advancement flap was deemed most appropriate.  Using a sterile surgical marker, an appropriate advancement flap was drawn incorporating the defect and placing the expected incisions within the relaxed skin tension lines where possible.    The area thus outlined was incised deep to adipose tissue with a #15 scalpel blade.  The skin margins were undermined to an appropriate distance in all directions utilizing iris scissors.
Trilobed Flap Text: The defect edges were debeveled with a #15 scalpel blade.  Given the location of the defect and the proximity to free margins a trilobed flap was deemed most appropriate.  Using a sterile surgical marker, an appropriate trilobed flap drawn around the defect.    The area thus outlined was incised deep to adipose tissue with a #15 scalpel blade.  The skin margins were undermined to an appropriate distance in all directions utilizing iris scissors.
Pre-Excision Curettage Text (Leave Blank If You Do Not Want): Prior to drawing the surgical margin the visible lesion was removed with electrodesiccation and curettage to clearly define the lesion size.
Star Wedge Flap Text: The defect edges were debeveled with a #15 scalpel blade.  Given the location of the defect, shape of the defect and the proximity to free margins a star wedge flap was deemed most appropriate.  Using a sterile surgical marker, an appropriate rotation flap was drawn incorporating the defect and placing the expected incisions within the relaxed skin tension lines where possible. The area thus outlined was incised deep to adipose tissue with a #15 scalpel blade.  The skin margins were undermined to an appropriate distance in all directions utilizing iris scissors.
Repair Performed By Another Provider Text (Leave Blank If You Do Not Want): After the tissue was excised the defect was repaired by another provider.
Island Pedicle Flap With Canthal Suspension Text: The defect edges were debeveled with a #15 scalpel blade.  Given the location of the defect, shape of the defect and the proximity to free margins an island pedicle advancement flap was deemed most appropriate.  Using a sterile surgical marker, an appropriate advancement flap was drawn incorporating the defect, outlining the appropriate donor tissue and placing the expected incisions within the relaxed skin tension lines where possible. The area thus outlined was incised deep to adipose tissue with a #15 scalpel blade.  The skin margins were undermined to an appropriate distance in all directions around the primary defect and laterally outward around the island pedicle utilizing iris scissors.  There was minimal undermining beneath the pedicle flap. A suspension suture was placed in the canthal tendon to prevent tension and prevent ectropion.
Complex Repair And M Plasty Text: The defect edges were debeveled with a #15 scalpel blade.  The primary defect was closed partially with a complex linear closure.  Given the location of the remaining defect, shape of the defect and the proximity to free margins an M plasty was deemed most appropriate for complete closure of the defect.  Using a sterile surgical marker, an appropriate advancement flap was drawn incorporating the defect and placing the expected incisions within the relaxed skin tension lines where possible.    The area thus outlined was incised deep to adipose tissue with a #15 scalpel blade.  The skin margins were undermined to an appropriate distance in all directions utilizing iris scissors.
Excision Method: Elliptical
Epidermal Autograft Text: The defect edges were debeveled with a #15 scalpel blade.  Given the location of the defect, shape of the defect and the proximity to free margins an epidermal autograft was deemed most appropriate.  Using a sterile surgical marker, the primary defect shape was transferred to the donor site. The epidermal graft was then harvested.  The skin graft was then placed in the primary defect and oriented appropriately.
Deep Sutures: 6-0 Vicryl
W Plasty Text: The lesion was extirpated to the level of the fat with a #15 scalpel blade.  Given the location of the defect, shape of the defect and the proximity to free margins a W-plasty was deemed most appropriate for repair.  Using a sterile surgical marker, the appropriate transposition arms of the W-plasty were drawn incorporating the defect and placing the expected incisions within the relaxed skin tension lines where possible.    The area thus outlined was incised deep to adipose tissue with a #15 scalpel blade.  The skin margins were undermined to an appropriate distance in all directions utilizing iris scissors.  The opposing transposition arms were then transposed into place in opposite direction and anchored with interrupted buried subcutaneous sutures.
Complex Repair And Ftsg Text: The defect edges were debeveled with a #15 scalpel blade.  The primary defect was closed partially with a complex linear closure.  Given the location of the defect, shape of the defect and the proximity to free margins a full thickness skin graft was deemed most appropriate to repair the remaining defect.  The graft was trimmed to fit the size of the remaining defect.  The graft was then placed in the primary defect, oriented appropriately, and sutured into place.
Purse String (Simple) Text: Given the location of the defect and the characteristics of the surrounding skin a purse string simple closure was deemed most appropriate.  Undermining was performed circumferentially around the surgical defect.  A purse string suture was then placed and tightened.
Bilobed Transposition Flap Text: The defect edges were debeveled with a #15 scalpel blade.  Given the location of the defect and the proximity to free margins a bilobed transposition flap was deemed most appropriate.  Using a sterile surgical marker, an appropriate bilobe flap drawn around the defect.    The area thus outlined was incised deep to adipose tissue with a #15 scalpel blade.  The skin margins were undermined to an appropriate distance in all directions utilizing iris scissors.
Anesthesia Type: 1% lidocaine with epinephrine
Dermal Autograft Text: The defect edges were debeveled with a #15 scalpel blade.  Given the location of the defect, shape of the defect and the proximity to free margins a dermal autograft was deemed most appropriate.  Using a sterile surgical marker, the primary defect shape was transferred to the donor site. The area thus outlined was incised deep to adipose tissue with a #15 scalpel blade.  The harvested graft was then trimmed of adipose and epidermal tissue until only dermis was left.  The skin graft was then placed in the primary defect and oriented appropriately.
Complex Repair And O-T Advancement Flap Text: The defect edges were debeveled with a #15 scalpel blade.  The primary defect was closed partially with a complex linear closure.  Given the location of the remaining defect, shape of the defect and the proximity to free margins an O-T advancement flap was deemed most appropriate for complete closure of the defect.  Using a sterile surgical marker, an appropriate advancement flap was drawn incorporating the defect and placing the expected incisions within the relaxed skin tension lines where possible.    The area thus outlined was incised deep to adipose tissue with a #15 scalpel blade.  The skin margins were undermined to an appropriate distance in all directions utilizing iris scissors.
Rhombic Flap Text: The defect edges were debeveled with a #15 scalpel blade.  Given the location of the defect and the proximity to free margins a rhombic flap was deemed most appropriate.  Using a sterile surgical marker, an appropriate rhombic flap was drawn incorporating the defect.    The area thus outlined was incised deep to adipose tissue with a #15 scalpel blade.  The skin margins were undermined to an appropriate distance in all directions utilizing iris scissors.
Complex Repair And Double M Plasty Text: The defect edges were debeveled with a #15 scalpel blade.  The primary defect was closed partially with a complex linear closure.  Given the location of the remaining defect, shape of the defect and the proximity to free margins a double M plasty was deemed most appropriate for complete closure of the defect.  Using a sterile surgical marker, an appropriate advancement flap was drawn incorporating the defect and placing the expected incisions within the relaxed skin tension lines where possible.    The area thus outlined was incised deep to adipose tissue with a #15 scalpel blade.  The skin margins were undermined to an appropriate distance in all directions utilizing iris scissors.
Complex Repair And W Plasty Text: The defect edges were debeveled with a #15 scalpel blade.  The primary defect was closed partially with a complex linear closure.  Given the location of the remaining defect, shape of the defect and the proximity to free margins a W plasty was deemed most appropriate for complete closure of the defect.  Using a sterile surgical marker, an appropriate advancement flap was drawn incorporating the defect and placing the expected incisions within the relaxed skin tension lines where possible.    The area thus outlined was incised deep to adipose tissue with a #15 scalpel blade.  The skin margins were undermined to an appropriate distance in all directions utilizing iris scissors.
Perilesional Excision Additional Text (Leave Blank If You Do Not Want): The margin was drawn around the clinically apparent lesion. Incisions were then made along these lines to the appropriate tissue plane and the lesion was extirpated.
Excision Depth: adipose tissue
Split-Thickness Skin Graft Text: The defect edges were debeveled with a #15 scalpel blade.  Given the location of the defect, shape of the defect and the proximity to free margins a split thickness skin graft was deemed most appropriate.  Using a sterile surgical marker, the primary defect shape was transferred to the donor site. The split thickness graft was then harvested.  The skin graft was then placed in the primary defect and oriented appropriately.
Complex Repair And A-T Advancement Flap Text: The defect edges were debeveled with a #15 scalpel blade.  The primary defect was closed partially with a complex linear closure.  Given the location of the remaining defect, shape of the defect and the proximity to free margins an A-T advancement flap was deemed most appropriate for complete closure of the defect.  Using a sterile surgical marker, an appropriate advancement flap was drawn incorporating the defect and placing the expected incisions within the relaxed skin tension lines where possible.    The area thus outlined was incised deep to adipose tissue with a #15 scalpel blade.  The skin margins were undermined to an appropriate distance in all directions utilizing iris scissors.
Bi-Rhombic Flap Text: The defect edges were debeveled with a #15 scalpel blade.  Given the location of the defect and the proximity to free margins a bi-rhombic flap was deemed most appropriate.  Using a sterile surgical marker, an appropriate rhombic flap was drawn incorporating the defect. The area thus outlined was incised deep to adipose tissue with a #15 scalpel blade.  The skin margins were undermined to an appropriate distance in all directions utilizing iris scissors.
Posterior Auricular Interpolation Flap Text: A decision was made to reconstruct the defect utilizing an interpolation axial flap and a staged reconstruction.  A telfa template was made of the defect.  This telfa template was then used to outline the posterior auricular interpolation flap.  The donor area for the pedicle flap was then injected with anesthesia.  The flap was excised through the skin and subcutaneous tissue down to the layer of the underlying musculature.  The pedicle flap was carefully excised within this deep plane to maintain its blood supply.  The edges of the donor site were undermined.   The donor site was closed in a primary fashion.  The pedicle was then rotated into position and sutured.  Once the tube was sutured into place, adequate blood supply was confirmed with blanching and refill.  The pedicle was then wrapped with xeroform gauze and dressed appropriately with a telfa and gauze bandage to ensure continued blood supply and protect the attached pedicle.

## 2017-12-19 ENCOUNTER — APPOINTMENT (RX ONLY)
Dept: URBAN - METROPOLITAN AREA CLINIC 24 | Facility: CLINIC | Age: 82
Setting detail: DERMATOLOGY
End: 2017-12-19

## 2017-12-19 DIAGNOSIS — Z48.01 ENCOUNTER FOR CHANGE OR REMOVAL OF SURGICAL WOUND DRESSING: ICD-10-CM

## 2017-12-19 PROBLEM — K21.9 GASTRO-ESOPHAGEAL REFLUX DISEASE WITHOUT ESOPHAGITIS: Status: ACTIVE | Noted: 2017-12-19

## 2017-12-19 PROCEDURE — ? SUTURE REMOVAL (GLOBAL PERIOD)

## 2017-12-19 ASSESSMENT — LOCATION DETAILED DESCRIPTION DERM: LOCATION DETAILED: RIGHT INFERIOR HELIX

## 2017-12-19 ASSESSMENT — LOCATION SIMPLE DESCRIPTION DERM: LOCATION SIMPLE: RIGHT EAR

## 2017-12-19 ASSESSMENT — LOCATION ZONE DERM: LOCATION ZONE: EAR

## 2017-12-19 NOTE — PROCEDURE: SUTURE REMOVAL (GLOBAL PERIOD)
Detail Level: Simple
Add 62221 Cpt? (Important Note: In 2017 The Use Of 89435 Is Being Tracked By Cms To Determine Future Global Period Reimbursement For Global Periods): no

## 2018-05-01 PROBLEM — Z79.01 LONG TERM (CURRENT) USE OF ANTICOAGULANTS: Status: ACTIVE | Noted: 2018-05-01

## 2018-05-22 ENCOUNTER — APPOINTMENT (RX ONLY)
Dept: URBAN - METROPOLITAN AREA CLINIC 24 | Facility: CLINIC | Age: 83
Setting detail: DERMATOLOGY
End: 2018-05-22

## 2018-05-22 DIAGNOSIS — D18.0 HEMANGIOMA: ICD-10-CM

## 2018-05-22 DIAGNOSIS — Z87.2 PERSONAL HISTORY OF DISEASES OF THE SKIN AND SUBCUTANEOUS TISSUE: ICD-10-CM

## 2018-05-22 DIAGNOSIS — Z85.828 PERSONAL HISTORY OF OTHER MALIGNANT NEOPLASM OF SKIN: ICD-10-CM

## 2018-05-22 DIAGNOSIS — L82.1 OTHER SEBORRHEIC KERATOSIS: ICD-10-CM

## 2018-05-22 DIAGNOSIS — L57.0 ACTINIC KERATOSIS: ICD-10-CM

## 2018-05-22 PROBLEM — D18.01 HEMANGIOMA OF SKIN AND SUBCUTANEOUS TISSUE: Status: ACTIVE | Noted: 2018-05-22

## 2018-05-22 PROBLEM — L70.0 ACNE VULGARIS: Status: ACTIVE | Noted: 2018-05-22

## 2018-05-22 PROBLEM — F32.9 MAJOR DEPRESSIVE DISORDER, SINGLE EPISODE, UNSPECIFIED: Status: ACTIVE | Noted: 2018-05-22

## 2018-05-22 PROCEDURE — ? COUNSELING

## 2018-05-22 PROCEDURE — 17000 DESTRUCT PREMALG LESION: CPT

## 2018-05-22 PROCEDURE — ? LIQUID NITROGEN

## 2018-05-22 PROCEDURE — 17003 DESTRUCT PREMALG LES 2-14: CPT

## 2018-05-22 PROCEDURE — 99214 OFFICE O/P EST MOD 30 MIN: CPT | Mod: 25

## 2018-05-22 ASSESSMENT — LOCATION DETAILED DESCRIPTION DERM
LOCATION DETAILED: RIGHT INFERIOR LATERAL FOREHEAD
LOCATION DETAILED: RIGHT SUPERIOR FOREHEAD
LOCATION DETAILED: RIGHT SUPERIOR MEDIAL UPPER BACK
LOCATION DETAILED: LEFT LATERAL SUPERIOR CHEST
LOCATION DETAILED: PERIUMBILICAL SKIN
LOCATION DETAILED: LEFT FOREHEAD
LOCATION DETAILED: EPIGASTRIC SKIN
LOCATION DETAILED: RIGHT INFERIOR HELIX
LOCATION DETAILED: LEFT INFERIOR LATERAL FOREHEAD
LOCATION DETAILED: RIGHT INFERIOR UPPER BACK
LOCATION DETAILED: LEFT SUPERIOR MEDIAL MIDBACK

## 2018-05-22 ASSESSMENT — LOCATION SIMPLE DESCRIPTION DERM
LOCATION SIMPLE: RIGHT FOREHEAD
LOCATION SIMPLE: ABDOMEN
LOCATION SIMPLE: RIGHT UPPER BACK
LOCATION SIMPLE: CHEST
LOCATION SIMPLE: LEFT FOREHEAD
LOCATION SIMPLE: LEFT LOWER BACK
LOCATION SIMPLE: RIGHT EAR

## 2018-05-22 ASSESSMENT — LOCATION ZONE DERM
LOCATION ZONE: FACE
LOCATION ZONE: EAR
LOCATION ZONE: TRUNK

## 2018-11-27 ENCOUNTER — APPOINTMENT (RX ONLY)
Dept: URBAN - METROPOLITAN AREA CLINIC 24 | Facility: CLINIC | Age: 83
Setting detail: DERMATOLOGY
End: 2018-11-27

## 2018-11-27 DIAGNOSIS — D22 MELANOCYTIC NEVI: ICD-10-CM

## 2018-11-27 DIAGNOSIS — Z87.2 PERSONAL HISTORY OF DISEASES OF THE SKIN AND SUBCUTANEOUS TISSUE: ICD-10-CM

## 2018-11-27 DIAGNOSIS — L81.4 OTHER MELANIN HYPERPIGMENTATION: ICD-10-CM

## 2018-11-27 DIAGNOSIS — L82.1 OTHER SEBORRHEIC KERATOSIS: ICD-10-CM

## 2018-11-27 DIAGNOSIS — L57.0 ACTINIC KERATOSIS: ICD-10-CM

## 2018-11-27 DIAGNOSIS — Z85.828 PERSONAL HISTORY OF OTHER MALIGNANT NEOPLASM OF SKIN: ICD-10-CM

## 2018-11-27 PROBLEM — L40.0 PSORIASIS VULGARIS: Status: ACTIVE | Noted: 2018-11-27

## 2018-11-27 PROBLEM — D22.5 MELANOCYTIC NEVI OF TRUNK: Status: ACTIVE | Noted: 2018-11-27

## 2018-11-27 PROBLEM — I48.91 UNSPECIFIED ATRIAL FIBRILLATION: Status: ACTIVE | Noted: 2018-11-27

## 2018-11-27 PROCEDURE — 17000 DESTRUCT PREMALG LESION: CPT

## 2018-11-27 PROCEDURE — 17003 DESTRUCT PREMALG LES 2-14: CPT

## 2018-11-27 PROCEDURE — ? LIQUID NITROGEN

## 2018-11-27 PROCEDURE — 99213 OFFICE O/P EST LOW 20 MIN: CPT | Mod: 25

## 2018-11-27 PROCEDURE — ? COUNSELING

## 2018-11-27 ASSESSMENT — LOCATION ZONE DERM
LOCATION ZONE: FACE
LOCATION ZONE: EAR
LOCATION ZONE: TRUNK
LOCATION ZONE: NOSE
LOCATION ZONE: ARM

## 2018-11-27 ASSESSMENT — LOCATION SIMPLE DESCRIPTION DERM
LOCATION SIMPLE: LEFT CHEEK
LOCATION SIMPLE: RIGHT SHOULDER
LOCATION SIMPLE: NOSE
LOCATION SIMPLE: UPPER BACK
LOCATION SIMPLE: RIGHT CHEEK
LOCATION SIMPLE: LEFT SHOULDER
LOCATION SIMPLE: RIGHT EAR
LOCATION SIMPLE: LEFT ZYGOMA
LOCATION SIMPLE: CHEST
LOCATION SIMPLE: RIGHT UPPER BACK
LOCATION SIMPLE: LEFT EAR
LOCATION SIMPLE: ABDOMEN

## 2018-11-27 ASSESSMENT — LOCATION DETAILED DESCRIPTION DERM
LOCATION DETAILED: RIGHT INFERIOR CENTRAL MALAR CHEEK
LOCATION DETAILED: LEFT LATERAL ZYGOMA
LOCATION DETAILED: RIGHT SUPERIOR MEDIAL MALAR CHEEK
LOCATION DETAILED: LEFT POSTERIOR SHOULDER
LOCATION DETAILED: LEFT MEDIAL SUPERIOR CHEST
LOCATION DETAILED: RIGHT INFERIOR HELIX
LOCATION DETAILED: LEFT INFERIOR HELIX
LOCATION DETAILED: INFERIOR THORACIC SPINE
LOCATION DETAILED: SUBXIPHOID
LOCATION DETAILED: RIGHT POSTERIOR SHOULDER
LOCATION DETAILED: LEFT CENTRAL MALAR CHEEK
LOCATION DETAILED: RIGHT INFERIOR MEDIAL UPPER BACK
LOCATION DETAILED: LEFT INFERIOR LATERAL MALAR CHEEK
LOCATION DETAILED: MIDDLE STERNUM
LOCATION DETAILED: LEFT CENTRAL ZYGOMA
LOCATION DETAILED: RIGHT SUPERIOR MEDIAL UPPER BACK
LOCATION DETAILED: NASAL SUPRATIP
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
## 2019-06-06 ENCOUNTER — APPOINTMENT (RX ONLY)
Dept: URBAN - METROPOLITAN AREA CLINIC 24 | Facility: CLINIC | Age: 84
Setting detail: DERMATOLOGY
End: 2019-06-06

## 2019-06-06 DIAGNOSIS — L81.4 OTHER MELANIN HYPERPIGMENTATION: ICD-10-CM

## 2019-06-06 DIAGNOSIS — L57.0 ACTINIC KERATOSIS: ICD-10-CM

## 2019-06-06 DIAGNOSIS — Z87.2 PERSONAL HISTORY OF DISEASES OF THE SKIN AND SUBCUTANEOUS TISSUE: ICD-10-CM

## 2019-06-06 DIAGNOSIS — L23.89 ALLERGIC CONTACT DERMATITIS DUE TO OTHER AGENTS: ICD-10-CM

## 2019-06-06 DIAGNOSIS — Z85.828 PERSONAL HISTORY OF OTHER MALIGNANT NEOPLASM OF SKIN: ICD-10-CM

## 2019-06-06 PROBLEM — E03.9 HYPOTHYROIDISM, UNSPECIFIED: Status: ACTIVE | Noted: 2019-06-06

## 2019-06-06 PROBLEM — M12.9 ARTHROPATHY, UNSPECIFIED: Status: ACTIVE | Noted: 2019-06-06

## 2019-06-06 PROBLEM — I48.91 UNSPECIFIED ATRIAL FIBRILLATION: Status: ACTIVE | Noted: 2019-06-06

## 2019-06-06 PROBLEM — F32.9 MAJOR DEPRESSIVE DISORDER, SINGLE EPISODE, UNSPECIFIED: Status: ACTIVE | Noted: 2019-06-06

## 2019-06-06 PROBLEM — L40.0 PSORIASIS VULGARIS: Status: ACTIVE | Noted: 2019-06-06

## 2019-06-06 PROBLEM — F41.9 ANXIETY DISORDER, UNSPECIFIED: Status: ACTIVE | Noted: 2019-06-06

## 2019-06-06 PROCEDURE — 99213 OFFICE O/P EST LOW 20 MIN: CPT | Mod: 25

## 2019-06-06 PROCEDURE — ? PRESCRIPTION

## 2019-06-06 PROCEDURE — ? COUNSELING

## 2019-06-06 PROCEDURE — 17003 DESTRUCT PREMALG LES 2-14: CPT

## 2019-06-06 PROCEDURE — ? LIQUID NITROGEN

## 2019-06-06 PROCEDURE — 17000 DESTRUCT PREMALG LESION: CPT

## 2019-06-06 RX ORDER — BETAMETHASONE VALERATE 1 MG/G
CREAM TOPICAL
Qty: 2 | Refills: 0 | Status: CANCELLED | COMMUNITY
Start: 2019-06-06

## 2019-06-06 RX ADMIN — BETAMETHASONE VALERATE: 1 CREAM TOPICAL at 14:22

## 2019-06-06 ASSESSMENT — LOCATION DETAILED DESCRIPTION DERM
LOCATION DETAILED: LEFT INFERIOR LATERAL FOREHEAD
LOCATION DETAILED: SUPERIOR THORACIC SPINE
LOCATION DETAILED: RIGHT MEDIAL FOREHEAD
LOCATION DETAILED: RIGHT INFERIOR HELIX
LOCATION DETAILED: RIGHT FOREHEAD
LOCATION DETAILED: LEFT CLAVICULAR SKIN
LOCATION DETAILED: RIGHT ANTERIOR DISTAL UPPER ARM
LOCATION DETAILED: RIGHT DISTAL DORSAL FOREARM
LOCATION DETAILED: RIGHT SUPERIOR MEDIAL UPPER BACK
LOCATION DETAILED: LEFT DISTAL DORSAL FOREARM
LOCATION DETAILED: PERIUMBILICAL SKIN

## 2019-06-06 ASSESSMENT — LOCATION SIMPLE DESCRIPTION DERM
LOCATION SIMPLE: RIGHT FOREHEAD
LOCATION SIMPLE: RIGHT UPPER BACK
LOCATION SIMPLE: LEFT CLAVICULAR SKIN
LOCATION SIMPLE: LEFT FOREHEAD
LOCATION SIMPLE: LEFT FOREARM
LOCATION SIMPLE: UPPER BACK
LOCATION SIMPLE: RIGHT EAR
LOCATION SIMPLE: RIGHT UPPER ARM
LOCATION SIMPLE: ABDOMEN
LOCATION SIMPLE: RIGHT FOREARM

## 2019-06-06 ASSESSMENT — LOCATION ZONE DERM
LOCATION ZONE: TRUNK
LOCATION ZONE: FACE
LOCATION ZONE: ARM
LOCATION ZONE: EAR

## 2019-09-25 PROBLEM — Z01.810 PREOP CARDIOVASCULAR EXAM: Status: RESOLVED | Noted: 2017-02-07 | Resolved: 2019-09-25

## 2019-11-20 PROBLEM — R55 SYNCOPE: Status: ACTIVE | Noted: 2019-11-20

## 2019-11-25 NOTE — PROGRESS NOTES
Called to pre-assess for Loop Monitor with Dr Heath Gifford , Scheduled 11/26/19. No answer & message left.

## 2019-11-26 ENCOUNTER — HOSPITAL ENCOUNTER (OUTPATIENT)
Dept: CARDIAC CATH/INVASIVE PROCEDURES | Age: 84
Discharge: HOME | End: 2019-11-26
Attending: INTERNAL MEDICINE | Admitting: INTERNAL MEDICINE
Payer: MEDICARE

## 2019-11-26 VITALS
RESPIRATION RATE: 16 BRPM | DIASTOLIC BLOOD PRESSURE: 76 MMHG | HEART RATE: 55 BPM | SYSTOLIC BLOOD PRESSURE: 182 MMHG | BODY MASS INDEX: 28.93 KG/M2 | HEIGHT: 66 IN | OXYGEN SATURATION: 94 % | WEIGHT: 180 LBS

## 2019-11-26 LAB
ANION GAP SERPL CALC-SCNC: 3 MMOL/L (ref 7–16)
ATRIAL RATE: 44 BPM
BUN SERPL-MCNC: 16 MG/DL (ref 8–23)
CALCIUM SERPL-MCNC: 9.7 MG/DL (ref 8.3–10.4)
CALCULATED P AXIS, ECG09: 68 DEGREES
CALCULATED R AXIS, ECG10: 31 DEGREES
CALCULATED T AXIS, ECG11: 14 DEGREES
CHLORIDE SERPL-SCNC: 101 MMOL/L (ref 98–107)
CO2 SERPL-SCNC: 35 MMOL/L (ref 21–32)
CREAT SERPL-MCNC: 0.87 MG/DL (ref 0.6–1)
DIAGNOSIS, 93000: NORMAL
ERYTHROCYTE [DISTWIDTH] IN BLOOD BY AUTOMATED COUNT: 13.8 % (ref 11.9–14.6)
GLUCOSE SERPL-MCNC: 91 MG/DL (ref 65–100)
HCT VFR BLD AUTO: 37.2 % (ref 35.8–46.3)
HGB BLD-MCNC: 12.2 G/DL (ref 11.7–15.4)
INR PPP: 1
MAGNESIUM SERPL-MCNC: 1.9 MG/DL (ref 1.8–2.4)
MCH RBC QN AUTO: 30.1 PG (ref 26.1–32.9)
MCHC RBC AUTO-ENTMCNC: 32.8 G/DL (ref 31.4–35)
MCV RBC AUTO: 91.9 FL (ref 79.6–97.8)
NRBC # BLD: 0 K/UL (ref 0–0.2)
P-R INTERVAL, ECG05: 138 MS
PLATELET # BLD AUTO: 190 K/UL (ref 150–450)
PMV BLD AUTO: 9.9 FL (ref 9.4–12.3)
POTASSIUM SERPL-SCNC: 3.2 MMOL/L (ref 3.5–5.1)
PROTHROMBIN TIME: 13.5 SEC (ref 11.7–14.5)
Q-T INTERVAL, ECG07: 508 MS
QRS DURATION, ECG06: 100 MS
QTC CALCULATION (BEZET), ECG08: 434 MS
RBC # BLD AUTO: 4.05 M/UL (ref 4.05–5.2)
SODIUM SERPL-SCNC: 139 MMOL/L (ref 136–145)
VENTRICULAR RATE, ECG03: 44 BPM
WBC # BLD AUTO: 5.3 K/UL (ref 4.3–11.1)

## 2019-11-26 PROCEDURE — 93005 ELECTROCARDIOGRAM TRACING: CPT | Performed by: INTERNAL MEDICINE

## 2019-11-26 PROCEDURE — 74011000250 HC RX REV CODE- 250: Performed by: INTERNAL MEDICINE

## 2019-11-26 PROCEDURE — 85610 PROTHROMBIN TIME: CPT

## 2019-11-26 PROCEDURE — 80048 BASIC METABOLIC PNL TOTAL CA: CPT

## 2019-11-26 PROCEDURE — 77030012935 HC DRSG AQUACEL BMS -B

## 2019-11-26 PROCEDURE — 33285 INSJ SUBQ CAR RHYTHM MNTR: CPT

## 2019-11-26 PROCEDURE — 85027 COMPLETE CBC AUTOMATED: CPT

## 2019-11-26 PROCEDURE — 83735 ASSAY OF MAGNESIUM: CPT

## 2019-11-26 PROCEDURE — C1764 EVENT RECORDER, CARDIAC: HCPCS

## 2019-11-26 RX ORDER — SODIUM CHLORIDE 9 MG/ML
75 INJECTION, SOLUTION INTRAVENOUS CONTINUOUS
Status: DISCONTINUED | OUTPATIENT
Start: 2019-11-26 | End: 2019-11-26 | Stop reason: HOSPADM

## 2019-11-26 RX ORDER — LIDOCAINE HYDROCHLORIDE 10 MG/ML
5-40 INJECTION INFILTRATION; PERINEURAL
Status: DISCONTINUED | OUTPATIENT
Start: 2019-11-26 | End: 2019-11-26 | Stop reason: HOSPADM

## 2019-11-26 RX ORDER — SODIUM CHLORIDE 0.9 % (FLUSH) 0.9 %
5 SYRINGE (ML) INJECTION AS NEEDED
Status: DISCONTINUED | OUTPATIENT
Start: 2019-11-26 | End: 2019-11-26 | Stop reason: HOSPADM

## 2019-11-26 RX ADMIN — LIDOCAINE HYDROCHLORIDE 10 ML: 10 INJECTION, SOLUTION INFILTRATION; PERINEURAL at 10:54

## 2019-11-26 NOTE — PROCEDURES
LOOP RECORDER IMPLANT            PROCEDURE PERFORMED:  Loop implantation. PREOPERATIVE DIAGNOSIS/ indication:  syncope     POSTOPERATIVE DIAGNOSIS:  loop implant     INDICATION: as above     IMPLANT: MRI conditional loop recorder     SPECIMENS REMOVED:  None. ESTIMATED BLOOD LOSS:  1 mL. SURGEON:  Niecy Barton MD     ASSISTANT:  None. SEDATION: none     ANESTHESIA:  Local anesthesia is lidocaine. COMPLICATIONS:  none. PROCEDURE:  After obtaining informed consent, the patient was brought to the lab. Left pectoral region was cleaned and prepped in the sterile fashion. Lidocaine solution was used for local anesthesia. Utilizing the appropriate kit, a punch incision was made with the included blade. Insertion tool was used to make a tract and the insertable cardiac monitor was implanted subcutaneously without difficulty. A single 4-0 subcuticular Vicryl suture was used for closure. All counts were corrected and there was no complication.      The implanted device is Parastructure  serial number [id number]

## 2019-11-26 NOTE — H&P
Jason Reno MD   Physician   Cardiology   Progress Notes      Signed   Encounter Date:  2019                    []Hide copied text    []Dex for details          [facility]  [address] [place], [place] [address], [address]  PHONE: [phone]        19     NAME:  Farooq Fine  : 1935  MRN: [id number]            SUBJECTIVE:   Farooq Fine is a 82 y.o. female seen for a follow up visit regarding the following:           Chief Complaint   Patient presents with    Syncope       occurred 10/23         HPI:     HPI patient is comes back because her recent syncopal episode. She states about a month ago she got out of bed during the night to go to the bathroom. She felt woozy for a few seconds and hit the floor. She has some ecchymoses around her eyes and nose. She did not suffer a fractures. She is had no recurrent headaches or visual disturbance nausea etc..  Rare palpitations. She is followed for paroxysmal atrial fibrillation is on low-dose flecainide and Eliquis propranolol. She has had previous syncopal episodes sporadic over the last 5 years. They have always occur without any obvious warning. She is wearing a number of monitors in the past.  She has an allergic type reaction to the electrodes and it pulls her skin off etc.  She does not want to wear another monitor.     Past Medical History, Past Surgical History, Family history, Social History, and Medications were all reviewed with the patient today and updated as necessary.              Prior to Admission medications    Medication Sig Start Date End Date Taking? Authorizing Provider   levothyroxine (SYNTHROID) 50 mcg tablet Take  by mouth Daily (before breakfast).     Yes Provider, Historical   vitamin A 8,000 unit capsule Take 8,000 Units by mouth daily.     Yes Provider, Historical   apixaban (ELIQUIS) 5 mg tablet Take 1 Tab by mouth two (2) times a day.  19   Yes Jason Reno MD flecainide (TAMBOCOR) 50 mg tablet Take 1 Tab by mouth two (2) times a day. 6/13/19   Yes Wojciech Duron MD   propranolol (INDERAL) 10 mg tablet Take 1 Tab by mouth two (2) times a day. 6/13/19   Yes Wojciech Duron MD   calcium citrate/vitamin D3 (CALCIUM CITRATE + D PO) Take  by mouth. 2 tab po qd     Yes Provider, Historical   diclofenac (VOLTAREN) 1 % gel Apply  to affected area four (4) times daily.     Yes Provider, Historical   PHENAZOPYRIDINE HCL (PYRIDIUM PO) Take 1 tablet qid x 3 days, then skip 1 day. PRN     Yes Provider, Historical   zolpidem (AMBIEN) 10 mg tablet Take  by mouth nightly as needed for Sleep. Taking half a tablet.     Yes Provider, Historical   ferrous sulfate 325 mg (65 mg iron) tablet Take 325 mg by mouth daily.     Yes Provider, Historical   hydrOXYzine (ATARAX) 25 mg tablet Take 25 mg by mouth nightly as needed.     Yes Provider, Historical   multivitamin (ONE A DAY) tablet Take 1 Tab by mouth daily.     Yes Provider, Historical   HYDROcodone-acetaminophen (NORCO)  mg tablet Take 1 Tab by mouth every six (6) hours as needed.     Yes Provider, Historical   L.ACID/L. CASEI/B.BIF/B.ELTON/FOS (PROBIOTIC BLEND PO) Take  by mouth.     Yes Provider, Historical   lisinopril (PRINIVIL, ZESTRIL) 20 mg tablet Take 20 mg by mouth daily.     Yes Provider, Historical   nitroglycerin (NITROSTAT) 0.4 mg SL tablet Place 1 sl under the tongue q 5 min prn cp, max 3 sl in a 15-min time period.  Call 911 if no relief after the 3rd sl.     Yes Provider, Historical   omeprazole (PRILOSEC) 40 mg capsule Take 40 mg by mouth daily.     Yes Provider, Historical   potassium chloride SA (MICRO-K) 10 mEq capsule Take 10 mEq by mouth daily.     Yes Provider, Historical   FLUoxetine (PROZAC) 20 mg capsule Take 20 mg by mouth daily.     Yes Provider, Historical   FLUoxetine (PROZAC) 40 mg capsule Take 40 mg by mouth daily.     Yes Provider, Historical   DOCUSATE CALCIUM (STOOL SOFTENER PO) Take  by mouth.     Yes Provider, Historical   timolol (TIMOPTIC) 0.5 % ophthalmic solution 1 Drop two (2) times a day.     Yes Provider, Historical   tiZANidine (ZANAFLEX) 4 mg capsule Take 4 mg by mouth two (2) times a day.     Yes Provider, Historical   triamterene-hydrochlorothiazide (MAXZIDE) 75-50 mg per tablet Take  by mouth daily.     Yes Provider, Historical   estradiol (ESTRACE) 0.01 % (0.1 mg/gram) vaginal cream 1 g to the vaginal area 3 days per week 2/11/16   Yes Pavithra Yeager MD           Allergies   Allergen Reactions    Adhesive Tape-Silicones Unknown (comments)    Aloe Vera Unknown (comments)    Ampicillin Unknown (comments)    Morphine Unknown (comments)    Penicillins Unknown (comments)    Reglan [Metoclopramide] Unknown (comments)    Septra [Sulfamethoprim] Unknown (comments)    Sulfa (Sulfonamide Antibiotics) Other (comments)           Past Medical History:   Diagnosis Date    Chest pain 10/30/2015    Dyspnea      Headache      HTN (hypertension), benign, controlled 10/30/2015    Malaise and fatigue      Paroxysmal atrial fibrillation ([facility] [address] 10/30/2015    Preoperative cardiovascular examination              Past Surgical History:   Procedure Laterality Date    APPENDECTOMY        HX BREAST REDUCTION        HX HYSTERECTOMY        LAP,CHOLECYSTECTOMY                Family History   Problem Relation Age of Onset    Heart Attack Brother      Diabetes Mother      Cancer Mother           non hog    Diabetes Brother      Cancer Father           liver    Cancer Brother           prostate      Social History            Tobacco Use    Smoking status: Former Smoker       Packs/day: 1.00       Years: 20.00       Pack years: 20.00    Smokeless tobacco: Former User   Substance Use Topics    Alcohol use: No         ROS:     Review of Systems   Constitution: Negative. Eyes: Negative. Cardiovascular: Positive for irregular heartbeat and syncope. Respiratory: Negative.     Neurological: Positive for focal weakness and loss of balance. Negative for difficulty with concentration and headaches.            PHYSICAL EXAM:     Visit Vitals       /74 (BP 1 Location: Left arm, BP Patient Position: Sitting)   Pulse (!) 52   Ht 5' 6\" (1.676 m)   Wt 182 lb 3.2 oz (82.6 kg) Comment: with shoes   BMI 29.41 kg/m²                Wt Readings from Last 3 Encounters:   11/20/19 182 lb 3.2 oz (82.6 kg)   08/06/19 183 lb (83 kg)   02/05/19 190 lb (86.2 kg)          BP Readings from Last 3 Encounters:   11/20/19 122/74   08/06/19 132/78   02/05/19 122/64            Physical Exam   Constitutional: She appears well-developed and well-nourished. HENT:   Mild ecchymoses left eye and side of nose, no deformity. Eyes: Pupils are equal, round, and reactive to light. EOM are normal.   Neck: No JVD present. Cardiovascular: Regular rhythm. Bradycardia present. Exam reveals no gallop. No murmur heard. BP supine 150/78  Standing 145/80   Pulmonary/Chest: Breath sounds normal.   Abdominal: Soft. Musculoskeletal:         General: No edema. Neurological: She is alert. No cranial nerve deficit. Skin: Skin is warm and dry.         Medical problems and test results were reviewed with the patient today.               Lab Results   Component Value Date/Time     BUN 18 01/17/2017 06:12 AM            Lab Results   Component Value Date/Time     Creatinine 0.95 01/17/2017 06:12 AM            Lab Results   Component Value Date/Time     Potassium 3.4 (L) 01/17/2017 06:12 AM            No results found for: CHOL, CHOLPOCT, CHOLX, CHLST, CHOLV, HDL, HDLPOC, HDLP, LDL, LDLCPOC, LDLC, DLDLP, VLDLC, VLDL, TGLX, TRIGL, TRIGP, TGLPOCT, CHHD, CHHDX        ASSESSMENT and PLAN     Diagnoses and all orders for this visit:     1. HTN (hypertension), benign, controlled  -     AMB POC EKG ROUTINE W/ 12 LEADS, INTER & REP     2. Long term (current) use of anticoagulants     3.  Paroxysmal atrial fibrillation (HCC)  -     IMPLANT  LOOP RECORDER; Future     4. Syncope, unspecified syncope type  -     IMPLANT  LOOP RECORDER; Future                 IMPRESSION:      EKG today shows a sinus bradycardia 52. There is normal QRS duration QT interval.  MT interval is normal as well. There is no change from previous. Her history is suspicious for orthostatic hypotension although I could not really reproduce anything today. We cautioned about not getting up quickly out of the bed at night. Possible she had a transient bradycardia arrhythmia this caused this episode as well as some in the past.  As mentioned above, she does not want to wear another monitor because of the electrode issues. I think an implantable loop monitor would be reasonable at this point. Procedure indication risks were reviewed. She can leave her Eliquis off a couple days before and resume as directed.           Follow-up and Dispositions  ·   Return in about 4 weeks (around 12/18/2019).                      Idalia Bernard MD  11/20/2019  9:02 AM      Electronically signed by Wojciech Duron MD at 11/20/19 8212   Note Details     Author Wojciech Duron MD File Time 11/20/19 0698   Author Type Physician Status Signed   Last  Wojciech Duron MD Specialty Cardiology       Office Visit on 11/20/2019          Detailed Report         Note shared with patient

## 2019-11-26 NOTE — PROGRESS NOTES
TRANSFER - OUT REPORT:    Verbal report given to Storm Jimenez RN(name) on Christine Currie  being transferred to cpru(unit) for routine progression of care       Report consisted of patients Situation, Background, Assessment and   Recommendations(SBAR). Information from the following report(s) SBAR was reviewed with the receiving nurse. is allergic to adhesive tape-silicones; aloe vera; ampicillin; morphine; penicillins; reglan [metoclopramide]; septra [sulfamethoprim]; and sulfa (sulfonamide antibiotics). Opportunity for questions and clarification was provided. Procedure Summary:Pt had loop recorder placed in L chest, steven procedure well.     Visit Vitals  /88 (BP 1 Location: Left arm, BP Patient Position: Supine)   Pulse (!) 54   Resp 16   Ht 5' 6\" (1.676 m)   Wt 81.6 kg (180 lb)   SpO2 94%   Breastfeeding No   BMI 29.05 kg/m²     Past Medical History:   Diagnosis Date    Chest pain 10/30/2015    Dyspnea     GERD (gastroesophageal reflux disease)     Headache     HTN (hypertension), benign, controlled 10/30/2015    Malaise and fatigue     Paroxysmal atrial fibrillation ([address] 10/30/2015    Preoperative cardiovascular examination     Psychiatric disorder     PUD (peptic ulcer disease)     Thromboembolus ([address]     leg    Thyroid disease            Peripheral IV 11/26/19 Left Antecubital (Active)

## 2019-11-26 NOTE — DISCHARGE INSTRUCTIONS
LOOP MONITOR INSTRUCTIONS SHEET      Activity  · As tolerated and directed by your doctor  · Bathe or shower as directed by your doctor    Diet  · Resume your previous diet     Pain  ·  Call your doctor if pain is NOT relieved by OTC medication    Dressing Care: Leave dressing on the incision until seen at your follow up appointment . If dressing becomes loose and can't be re-attached ,  You may remove it. Keep area Clean & dry, Do not get incision wet for 48 hours. After that time you may let water run over incision but do not face the water. Do not apply any lotions, creams or powder to incision. Follow-Up Phone Calls  · Will be made nursing staff  · If you have any problems, call your doctor as needed    Call your doctor if  · Excessive bleeding that does not stop after holding mild pressure over the area  · Temperature of 101 degrees F or above  · Redness,excessive swelling or bruising, and/or green or yellow, smelly discharge from incision    After Anesthesia  · For the first 24 hours: DO NOT Drive, Drink alcoholic beverages, or Make important decisions  · Be aware of dizziness following anesthesia and while taking pain medication    Medications - Continue home medications as previously prescribed     Other Instructions- Always show the doctor or dentist your loop recorder identification card.       Appointment date/time - December 12th at 1:00pm    Your doctor's phone number - [phone] or the Device clinic [phone]

## 2019-11-26 NOTE — PROGRESS NOTES
Report received from [address]. Procedural findings communicated. Intra procedural  medication administration reviewed. Progression of care discussed. Patient received into [facility] [place] [facility] 7 post procedure.      Incision site without bleeding or swelling     Dressing dry and intact     Patient instructed to limit movement to bilateral upper extremity    Routine post procedural vital signs and site assessment initiated

## 2019-12-12 ENCOUNTER — APPOINTMENT (RX ONLY)
Dept: URBAN - METROPOLITAN AREA CLINIC 24 | Facility: CLINIC | Age: 84
Setting detail: DERMATOLOGY
End: 2019-12-12

## 2019-12-12 DIAGNOSIS — D485 NEOPLASM OF UNCERTAIN BEHAVIOR OF SKIN: ICD-10-CM

## 2019-12-12 DIAGNOSIS — L57.0 ACTINIC KERATOSIS: ICD-10-CM

## 2019-12-12 DIAGNOSIS — L82.1 OTHER SEBORRHEIC KERATOSIS: ICD-10-CM

## 2019-12-12 DIAGNOSIS — L81.4 OTHER MELANIN HYPERPIGMENTATION: ICD-10-CM

## 2019-12-12 DIAGNOSIS — Z87.2 PERSONAL HISTORY OF DISEASES OF THE SKIN AND SUBCUTANEOUS TISSUE: ICD-10-CM

## 2019-12-12 DIAGNOSIS — Z85.828 PERSONAL HISTORY OF OTHER MALIGNANT NEOPLASM OF SKIN: ICD-10-CM

## 2019-12-12 PROBLEM — F41.9 ANXIETY DISORDER, UNSPECIFIED: Status: ACTIVE | Noted: 2019-12-12

## 2019-12-12 PROBLEM — L70.0 ACNE VULGARIS: Status: ACTIVE | Noted: 2019-12-12

## 2019-12-12 PROBLEM — D48.5 NEOPLASM OF UNCERTAIN BEHAVIOR OF SKIN: Status: ACTIVE | Noted: 2019-12-12

## 2019-12-12 PROBLEM — F32.9 MAJOR DEPRESSIVE DISORDER, SINGLE EPISODE, UNSPECIFIED: Status: ACTIVE | Noted: 2019-12-12

## 2019-12-12 PROCEDURE — ? COUNSELING

## 2019-12-12 PROCEDURE — 99214 OFFICE O/P EST MOD 30 MIN: CPT | Mod: 25

## 2019-12-12 PROCEDURE — ? LIQUID NITROGEN

## 2019-12-12 PROCEDURE — 17000 DESTRUCT PREMALG LESION: CPT | Mod: 59

## 2019-12-12 PROCEDURE — 11102 TANGNTL BX SKIN SINGLE LES: CPT

## 2019-12-12 PROCEDURE — ? BIOPSY BY SHAVE METHOD

## 2019-12-12 PROCEDURE — 11103 TANGNTL BX SKIN EA SEP/ADDL: CPT

## 2019-12-12 PROCEDURE — 17003 DESTRUCT PREMALG LES 2-14: CPT

## 2019-12-12 ASSESSMENT — LOCATION SIMPLE DESCRIPTION DERM
LOCATION SIMPLE: NOSE
LOCATION SIMPLE: POSTERIOR NECK
LOCATION SIMPLE: LEFT CHEEK
LOCATION SIMPLE: LEFT FOREARM
LOCATION SIMPLE: ABDOMEN
LOCATION SIMPLE: RIGHT EAR
LOCATION SIMPLE: RIGHT FOREARM
LOCATION SIMPLE: RIGHT UPPER BACK
LOCATION SIMPLE: CHEST
LOCATION SIMPLE: RIGHT FOREHEAD

## 2019-12-12 ASSESSMENT — LOCATION DETAILED DESCRIPTION DERM
LOCATION DETAILED: LEFT SUPERIOR PREAURICULAR CHEEK
LOCATION DETAILED: RIGHT MID-UPPER BACK
LOCATION DETAILED: SUBXIPHOID
LOCATION DETAILED: UPPER STERNUM
LOCATION DETAILED: LEFT DISTAL RADIAL DORSAL FOREARM
LOCATION DETAILED: RIGHT INFERIOR HELIX
LOCATION DETAILED: RIGHT SUPERIOR MEDIAL UPPER BACK
LOCATION DETAILED: LEFT MEDIAL TRAPEZIAL NECK
LOCATION DETAILED: RIGHT DISTAL DORSAL FOREARM
LOCATION DETAILED: NASAL DORSUM
LOCATION DETAILED: LEFT CENTRAL MALAR CHEEK
LOCATION DETAILED: RIGHT LATERAL FOREHEAD

## 2019-12-12 ASSESSMENT — LOCATION ZONE DERM
LOCATION ZONE: NECK
LOCATION ZONE: ARM
LOCATION ZONE: NOSE
LOCATION ZONE: TRUNK
LOCATION ZONE: EAR
LOCATION ZONE: FACE

## 2019-12-12 NOTE — PROCEDURE: BIOPSY BY SHAVE METHOD
Was A Bandage Applied: Yes
Hide Anesthesia Volume?: No
Electrodesiccation And Curettage Text: The wound bed was treated with electrodesiccation and curettage after the biopsy was performed.
Electrodesiccation Text: The wound bed was treated with electrodesiccation after the biopsy was performed.
Billing Type: Third-Party Bill
Depth Of Biopsy: dermis
Cryotherapy Text: The wound bed was treated with cryotherapy after the biopsy was performed.
Dressing: bandage
Type Of Destruction Used: Curettage
Silver Nitrate Text: The wound bed was treated with silver nitrate after the biopsy was performed.
Biopsy Method: Dermablade
Anesthesia Volume In Cc: 0.3
Consent: Written consent was obtained and risks were reviewed including but not limited to scarring, infection, bleeding, scabbing, incomplete removal, and allergy to anesthesia.
Size Of Lesion In Cm: 0
Post-care instructions were reviewed in detail and written instructions are provided. Patient is to keep the biopsy site dry overnight, and then apply bacitracin twice daily until healed. Patient may apply hydrogen peroxide soaks to remove any crusting.
Anesthesia Type: 1% lidocaine with epinephrine
Wound Care: Vaseline
Notification Instructions: Patient will be notified of biopsy results. However, patient instructed to call the office if not contacted within 2 weeks.
Detail Level: Detailed
Biopsy Type: H and E
Accession #: [id number]
Hemostasis: Aluminum Chloride

## 2020-08-18 ENCOUNTER — APPOINTMENT (RX ONLY)
Dept: URBAN - METROPOLITAN AREA CLINIC 24 | Facility: CLINIC | Age: 85
Setting detail: DERMATOLOGY
End: 2020-08-18

## 2020-08-18 DIAGNOSIS — D485 NEOPLASM OF UNCERTAIN BEHAVIOR OF SKIN: ICD-10-CM

## 2020-08-18 DIAGNOSIS — L82.1 OTHER SEBORRHEIC KERATOSIS: ICD-10-CM

## 2020-08-18 DIAGNOSIS — Z85.828 PERSONAL HISTORY OF OTHER MALIGNANT NEOPLASM OF SKIN: ICD-10-CM

## 2020-08-18 DIAGNOSIS — L81.4 OTHER MELANIN HYPERPIGMENTATION: ICD-10-CM

## 2020-08-18 DIAGNOSIS — Z87.2 PERSONAL HISTORY OF DISEASES OF THE SKIN AND SUBCUTANEOUS TISSUE: ICD-10-CM

## 2020-08-18 PROBLEM — K21.9 GASTRO-ESOPHAGEAL REFLUX DISEASE WITHOUT ESOPHAGITIS: Status: ACTIVE | Noted: 2020-08-18

## 2020-08-18 PROBLEM — D48.5 NEOPLASM OF UNCERTAIN BEHAVIOR OF SKIN: Status: ACTIVE | Noted: 2020-08-18

## 2020-08-18 PROBLEM — L70.0 ACNE VULGARIS: Status: ACTIVE | Noted: 2020-08-18

## 2020-08-18 PROCEDURE — ? COUNSELING

## 2020-08-18 PROCEDURE — 11102 TANGNTL BX SKIN SINGLE LES: CPT

## 2020-08-18 PROCEDURE — 99214 OFFICE O/P EST MOD 30 MIN: CPT | Mod: 25

## 2020-08-18 PROCEDURE — ? BIOPSY BY SHAVE METHOD

## 2020-08-18 ASSESSMENT — LOCATION DETAILED DESCRIPTION DERM
LOCATION DETAILED: UPPER STERNUM
LOCATION DETAILED: RIGHT ANTERIOR MEDIAL DISTAL THIGH
LOCATION DETAILED: LEFT DISTAL RADIAL DORSAL FOREARM
LOCATION DETAILED: RIGHT INFERIOR HELIX
LOCATION DETAILED: RIGHT MID-UPPER BACK
LOCATION DETAILED: RIGHT DISTAL DORSAL FOREARM
LOCATION DETAILED: SUBXIPHOID
LOCATION DETAILED: LEFT MEDIAL TRAPEZIAL NECK
LOCATION DETAILED: RIGHT SUPERIOR MEDIAL UPPER BACK

## 2020-08-18 ASSESSMENT — LOCATION SIMPLE DESCRIPTION DERM
LOCATION SIMPLE: CHEST
LOCATION SIMPLE: ABDOMEN
LOCATION SIMPLE: RIGHT THIGH
LOCATION SIMPLE: LEFT FOREARM
LOCATION SIMPLE: RIGHT EAR
LOCATION SIMPLE: RIGHT FOREARM
LOCATION SIMPLE: RIGHT UPPER BACK
LOCATION SIMPLE: POSTERIOR NECK

## 2020-08-18 ASSESSMENT — LOCATION ZONE DERM
LOCATION ZONE: NECK
LOCATION ZONE: TRUNK
LOCATION ZONE: ARM
LOCATION ZONE: EAR
LOCATION ZONE: LEG

## 2020-08-18 NOTE — PROCEDURE: BIOPSY BY SHAVE METHOD
Hide Second Anesthesia?: No
Notification Instructions: Patient will be notified of biopsy results. However, patient instructed to call the office if not contacted within 2 weeks.
Validate Note Data (See Information Below): Yes
Electrodesiccation And Curettage Text: The wound bed was treated with electrodesiccation and curettage after the biopsy was performed.
X Size Of Lesion In Cm: 0
Billing Type: Third-Party Bill
Type Of Destruction Used: Curettage
Wound Care: Vaseline
Biopsy Type: H and E
Information: Selecting Yes will display possible errors in your note based on the variables you have selected. This validation is only offered as a suggestion for you. PLEASE NOTE THAT THE VALIDATION TEXT WILL BE REMOVED WHEN YOU FINALIZE YOUR NOTE. IF YOU WANT TO FAX A PRELIMINARY NOTE YOU WILL NEED TO TOGGLE THIS TO 'NO' IF YOU DO NOT WANT IT IN YOUR FAXED NOTE.
Cryotherapy Text: The wound bed was treated with cryotherapy after the biopsy was performed.
Anesthesia Volume In Cc: 0.3
Consent: Written consent was obtained and risks were reviewed including but not limited to scarring, infection, bleeding, scabbing, incomplete removal, and allergy to anesthesia.
Depth Of Biopsy: dermis
Hemostasis: Aluminum Chloride
Post-care instructions were reviewed in detail and written instructions are provided. Patient is to keep the biopsy site dry overnight, and then apply bacitracin twice daily until healed. Patient may apply hydrogen peroxide soaks to remove any crusting.
Silver Nitrate Text: The wound bed was treated with silver nitrate after the biopsy was performed.
Anesthesia Type: 1% lidocaine with epinephrine
Accession #: [id number]
Electrodesiccation Text: The wound bed was treated with electrodesiccation after the biopsy was performed.
Detail Level: Detailed
Dressing: bandage
Biopsy Method: Dermablade

## 2020-09-28 ENCOUNTER — APPOINTMENT (RX ONLY)
Dept: URBAN - METROPOLITAN AREA CLINIC 24 | Facility: CLINIC | Age: 85
Setting detail: DERMATOLOGY
End: 2020-09-28

## 2020-09-28 DIAGNOSIS — L57.0 ACTINIC KERATOSIS: ICD-10-CM

## 2020-09-28 PROBLEM — D04.71 CARCINOMA IN SITU OF SKIN OF RIGHT LOWER LIMB, INCLUDING HIP: Status: ACTIVE | Noted: 2020-09-28

## 2020-09-28 PROCEDURE — ? COUNSELING

## 2020-09-28 PROCEDURE — ? EXCISION

## 2020-09-28 PROCEDURE — 11602 EXC TR-EXT MAL+MARG 1.1-2 CM: CPT

## 2020-09-28 PROCEDURE — 17000 DESTRUCT PREMALG LESION: CPT | Mod: 59

## 2020-09-28 PROCEDURE — ? LIQUID NITROGEN

## 2020-09-28 PROCEDURE — 12032 INTMD RPR S/A/T/EXT 2.6-7.5: CPT | Mod: 59

## 2020-09-28 ASSESSMENT — LOCATION ZONE DERM: LOCATION ZONE: SCALP

## 2020-09-28 ASSESSMENT — LOCATION DETAILED DESCRIPTION DERM: LOCATION DETAILED: LEFT INFERIOR POSTAURICULAR SKIN

## 2020-09-28 ASSESSMENT — LOCATION SIMPLE DESCRIPTION DERM: LOCATION SIMPLE: SCALP

## 2020-09-28 NOTE — PROCEDURE: EXCISION
Graft Donor Site Will Heal By Secondary Intention: No
Star Wedge Flap Text: The defect edges were debeveled with a #15 scalpel blade.  Given the location of the defect, shape of the defect and the proximity to free margins a star wedge flap was deemed most appropriate.  Using a sterile surgical marker, an appropriate rotation flap was drawn incorporating the defect and placing the expected incisions within the relaxed skin tension lines where possible. The area thus outlined was incised deep to adipose tissue with a #15 scalpel blade.  The skin margins were undermined to an appropriate distance in all directions utilizing iris scissors.
Positioning (Leave Blank If You Do Not Want): The patient was placed in a comfortable position exposing the surgical site.
Epidermal Autograft Text: The defect edges were debeveled with a #15 scalpel blade.  Given the location of the defect, shape of the defect and the proximity to free margins an epidermal autograft was deemed most appropriate.  Using a sterile surgical marker, the primary defect shape was transferred to the donor site. The epidermal graft was then harvested.  The skin graft was then placed in the primary defect and oriented appropriately.
Mercedes Flap Text: The defect edges were debeveled with a #15 scalpel blade.  Given the location of the defect, shape of the defect and the proximity to free margins a Mercedes flap was deemed most appropriate.  Using a sterile surgical marker, an appropriate advancement flap was drawn incorporating the defect and placing the expected incisions within the relaxed skin tension lines where possible. The area thus outlined was incised deep to adipose tissue with a #15 scalpel blade.  The skin margins were undermined to an appropriate distance in all directions utilizing iris scissors.
Path Notes (To The Dermatopathologist): Please check margins
Show Accession Variable: Yes
Melolabial Interpolation Flap Text: A decision was made to reconstruct the defect utilizing an interpolation axial flap and a staged reconstruction.  A telfa template was made of the defect.  This telfa template was then used to outline the melolabial interpolation flap.  The donor area for the pedicle flap was then injected with anesthesia.  The flap was excised through the skin and subcutaneous tissue down to the layer of the underlying musculature.  The pedicle flap was carefully excised within this deep plane to maintain its blood supply.  The edges of the donor site were undermined.   The donor site was closed in a primary fashion.  The pedicle was then rotated into position and sutured.  Once the tube was sutured into place, adequate blood supply was confirmed with blanching and refill.  The pedicle was then wrapped with xeroform gauze and dressed appropriately with a telfa and gauze bandage to ensure continued blood supply and protect the attached pedicle.
Excision Method: Elliptical
Cheek Interpolation Flap Text: A decision was made to reconstruct the defect utilizing an interpolation axial flap and a staged reconstruction.  A telfa template was made of the defect.  This telfa template was then used to outline the Cheek Interpolation flap.  The donor area for the pedicle flap was then injected with anesthesia.  The flap was excised through the skin and subcutaneous tissue down to the layer of the underlying musculature.  The interpolation flap was carefully excised within this deep plane to maintain its blood supply.  The edges of the donor site were undermined.   The donor site was closed in a primary fashion.  The pedicle was then rotated into position and sutured.  Once the tube was sutured into place, adequate blood supply was confirmed with blanching and refill.  The pedicle was then wrapped with xeroform gauze and dressed appropriately with a telfa and gauze bandage to ensure continued blood supply and protect the attached pedicle.
Elliptical Excision Additional Text (Leave Blank If You Do Not Want): The margin was drawn around the clinically apparent lesion.  An elliptical shape was then drawn on the skin incorporating the lesion and margins.  Incisions were then made along these lines to the appropriate tissue plane and the lesion was extirpated.
No Repair - Repaired With Adjacent Surgical Defect Text (Leave Blank If You Do Not Want): After the excision the defect was repaired concurrently with another surgical defect which was in close approximation.
Double M-Plasty Complex Repair Preamble Text (Leave Blank If You Do Not Want): Extensive wide undermining was performed.
Melolabial Transposition Flap Text: The defect edges were debeveled with a #15 scalpel blade.  Given the location of the defect and the proximity to free margins a melolabial flap was deemed most appropriate.  Using a sterile surgical marker, an appropriate melolabial transposition flap was drawn incorporating the defect.    The area thus outlined was incised deep to adipose tissue with a #15 scalpel blade.  The skin margins were undermined to an appropriate distance in all directions utilizing iris scissors.
Number Of Hemigard Strips Per Side: 1
Complex Repair And Skin Substitute Graft Text: The defect edges were debeveled with a #15 scalpel blade.  The primary defect was closed partially with a complex linear closure.  Given the location of the remaining defect, shape of the defect and the proximity to free margins a skin substitute graft was deemed most appropriate to repair the remaining defect.  The graft was trimmed to fit the size of the remaining defect.  The graft was then placed in the primary defect, oriented appropriately, and sutured into place.
Second Skin Substitute Units (Will Override Primary Defect Units If Greater Than 0): 0
O-T Plasty Text: The defect edges were debeveled with a #15 scalpel blade.  Given the location of the defect, shape of the defect and the proximity to free margins an O-T plasty was deemed most appropriate.  Using a sterile surgical marker, an appropriate O-T plasty was drawn incorporating the defect and placing the expected incisions within the relaxed skin tension lines where possible.    The area thus outlined was incised deep to adipose tissue with a #15 scalpel blade.  The skin margins were undermined to an appropriate distance in all directions utilizing iris scissors.
Complex Repair And Burow's Graft Text: The defect edges were debeveled with a #15 scalpel blade.  The primary defect was closed partially with a complex linear closure.  Given the location of the defect, shape of the defect, the proximity to free margins and the presence of a standing cone deformity a Burow's graft was deemed most appropriate to repair the remaining defect.  The graft was trimmed to fit the size of the remaining defect.  The graft was then placed in the primary defect, oriented appropriately, and sutured into place.
Complex Repair And Single Advancement Flap Text: The defect edges were debeveled with a #15 scalpel blade.  The primary defect was closed partially with a complex linear closure.  Given the location of the remaining defect, shape of the defect and the proximity to free margins a single advancement flap was deemed most appropriate for complete closure of the defect.  Using a sterile surgical marker, an appropriate advancement flap was drawn incorporating the defect and placing the expected incisions within the relaxed skin tension lines where possible.    The area thus outlined was incised deep to adipose tissue with a #15 scalpel blade.  The skin margins were undermined to an appropriate distance in all directions utilizing iris scissors.
Tissue Cultured Epidermal Autograft Text: The defect edges were debeveled with a #15 scalpel blade.  Given the location of the defect, shape of the defect and the proximity to free margins a tissue cultured epidermal autograft was deemed most appropriate.  The graft was then trimmed to fit the size of the defect.  The graft was then placed in the primary defect and oriented appropriately.
Complex Repair And Rhombic Flap Text: The defect edges were debeveled with a #15 scalpel blade.  The primary defect was closed partially with a complex linear closure.  Given the location of the remaining defect, shape of the defect and the proximity to free margins a rhombic flap was deemed most appropriate for complete closure of the defect.  Using a sterile surgical marker, an appropriate advancement flap was drawn incorporating the defect and placing the expected incisions within the relaxed skin tension lines where possible.    The area thus outlined was incised deep to adipose tissue with a #15 scalpel blade.  The skin margins were undermined to an appropriate distance in all directions utilizing iris scissors.
Curvilinear Excision Additional Text (Leave Blank If You Do Not Want): The margin was drawn around the clinically apparent lesion.  A curvilinear shape was then drawn on the skin incorporating the lesion and margins.  Incisions were then made along these lines to the appropriate tissue plane and the lesion was extirpated.
Complex Repair And A-T Advancement Flap Text: The defect edges were debeveled with a #15 scalpel blade.  The primary defect was closed partially with a complex linear closure.  Given the location of the remaining defect, shape of the defect and the proximity to free margins an A-T advancement flap was deemed most appropriate for complete closure of the defect.  Using a sterile surgical marker, an appropriate advancement flap was drawn incorporating the defect and placing the expected incisions within the relaxed skin tension lines where possible.    The area thus outlined was incised deep to adipose tissue with a #15 scalpel blade.  The skin margins were undermined to an appropriate distance in all directions utilizing iris scissors.
Purse String (Simple) Text: Given the location of the defect and the characteristics of the surrounding skin a purse string simple closure was deemed most appropriate.  Undermining was performed circumferentially around the surgical defect.  A purse string suture was then placed and tightened.
Hemigard Postcare Instructions: The HEMIGARD strips are to remain completely dry for at least 5-7 days.
Island Pedicle Flap Text: The defect edges were debeveled with a #15 scalpel blade.  Given the location of the defect, shape of the defect and the proximity to free margins an island pedicle advancement flap was deemed most appropriate.  Using a sterile surgical marker, an appropriate advancement flap was drawn incorporating the defect, outlining the appropriate donor tissue and placing the expected incisions within the relaxed skin tension lines where possible.    The area thus outlined was incised deep to adipose tissue with a #15 scalpel blade.  The skin margins were undermined to an appropriate distance in all directions around the primary defect and laterally outward around the island pedicle utilizing iris scissors.  There was minimal undermining beneath the pedicle flap.
Retention Suture Text: Retention sutures were placed to support the closure and prevent dehiscence.
Rotation Flap Text: The defect edges were debeveled with a #15 scalpel blade.  Given the location of the defect, shape of the defect and the proximity to free margins a rotation flap was deemed most appropriate.  Using a sterile surgical marker, an appropriate rotation flap was drawn incorporating the defect and placing the expected incisions within the relaxed skin tension lines where possible.    The area thus outlined was incised deep to adipose tissue with a #15 scalpel blade.  The skin margins were undermined to an appropriate distance in all directions utilizing iris scissors.
V-Y Flap Text: The defect edges were debeveled with a #15 scalpel blade.  Given the location of the defect, shape of the defect and the proximity to free margins a V-Y flap was deemed most appropriate.  Using a sterile surgical marker, an appropriate advancement flap was drawn incorporating the defect and placing the expected incisions within the relaxed skin tension lines where possible.    The area thus outlined was incised deep to adipose tissue with a #15 scalpel blade.  The skin margins were undermined to an appropriate distance in all directions utilizing iris scissors.
Crescentic Advancement Flap Text: The defect edges were debeveled with a #15 scalpel blade.  Given the location of the defect and the proximity to free margins a crescentic advancement flap was deemed most appropriate.  Using a sterile surgical marker, the appropriate advancement flap was drawn incorporating the defect and placing the expected incisions within the relaxed skin tension lines where possible.    The area thus outlined was incised deep to adipose tissue with a #15 scalpel blade.  The skin margins were undermined to an appropriate distance in all directions utilizing iris scissors.
Lip Wedge Excision Repair Text: Given the location of the defect and the proximity to free margins a full thickness wedge repair was deemed most appropriate.  Using a sterile surgical marker, the appropriate repair was drawn incorporating the defect and placing the expected incisions perpendicular to the vermillion border.  The vermillion border was also meticulously outlined to ensure appropriate reapproximation during the repair.  The area thus outlined was incised through and through with a #15 scalpel blade.  The muscularis and dermis were reaproximated with deep sutures following hemostasis. Care was taken to realign the vermillion border before proceeding with the superficial closure.  Once the vermillion was realigned the superfical and mucosal closure was finished.
Complex Repair And Z Plasty Text: The defect edges were debeveled with a #15 scalpel blade.  The primary defect was closed partially with a complex linear closure.  Given the location of the remaining defect, shape of the defect and the proximity to free margins a Z plasty was deemed most appropriate for complete closure of the defect.  Using a sterile surgical marker, an appropriate advancement flap was drawn incorporating the defect and placing the expected incisions within the relaxed skin tension lines where possible.    The area thus outlined was incised deep to adipose tissue with a #15 scalpel blade.  The skin margins were undermined to an appropriate distance in all directions utilizing iris scissors.
Bilobed Transposition Flap Text: The defect edges were debeveled with a #15 scalpel blade.  Given the location of the defect and the proximity to free margins a bilobed transposition flap was deemed most appropriate.  Using a sterile surgical marker, an appropriate bilobe flap drawn around the defect.    The area thus outlined was incised deep to adipose tissue with a #15 scalpel blade.  The skin margins were undermined to an appropriate distance in all directions utilizing iris scissors.
Bi-Rhombic Flap Text: The defect edges were debeveled with a #15 scalpel blade.  Given the location of the defect and the proximity to free margins a bi-rhombic flap was deemed most appropriate.  Using a sterile surgical marker, an appropriate rhombic flap was drawn incorporating the defect. The area thus outlined was incised deep to adipose tissue with a #15 scalpel blade.  The skin margins were undermined to an appropriate distance in all directions utilizing iris scissors.
Nostril Rim Text: The closure involved the nostril rim.
Transposition Flap Text: The defect edges were debeveled with a #15 scalpel blade.  Given the location of the defect and the proximity to free margins a transposition flap was deemed most appropriate.  Using a sterile surgical marker, an appropriate transposition flap was drawn incorporating the defect.    The area thus outlined was incised deep to adipose tissue with a #15 scalpel blade.  The skin margins were undermined to an appropriate distance in all directions utilizing iris scissors.
Burow's Graft Text: The defect edges were debeveled with a #15 scalpel blade.  Given the location of the defect, shape of the defect, the proximity to free margins and the presence of a standing cone deformity a Burow's skin graft was deemed most appropriate. The standing cone was removed and this tissue was then trimmed to the shape of the primary defect. The adipose tissue was also removed until only dermis and epidermis were left.  The skin margins of the secondary defect were undermined to an appropriate distance in all directions utilizing iris scissors.  The secondary defect was closed with interrupted buried subcutaneous sutures.  The skin edges were then re-apposed with running  sutures.  The skin graft was then placed in the primary defect and oriented appropriately.
Mastoid Interpolation Flap Text: A decision was made to reconstruct the defect utilizing an interpolation axial flap and a staged reconstruction.  A telfa template was made of the defect.  This telfa template was then used to outline the mastoid interpolation flap.  The donor area for the pedicle flap was then injected with anesthesia.  The flap was excised through the skin and subcutaneous tissue down to the layer of the underlying musculature.  The pedicle flap was carefully excised within this deep plane to maintain its blood supply.  The edges of the donor site were undermined.   The donor site was closed in a primary fashion.  The pedicle was then rotated into position and sutured.  Once the tube was sutured into place, adequate blood supply was confirmed with blanching and refill.  The pedicle was then wrapped with xeroform gauze and dressed appropriately with a telfa and gauze bandage to ensure continued blood supply and protect the attached pedicle.
Retention Suture Bite Size: 3 mm
Double Island Pedicle Flap Text: The defect edges were debeveled with a #15 scalpel blade.  Given the location of the defect, shape of the defect and the proximity to free margins a double island pedicle advancement flap was deemed most appropriate.  Using a sterile surgical marker, an appropriate advancement flap was drawn incorporating the defect, outlining the appropriate donor tissue and placing the expected incisions within the relaxed skin tension lines where possible.    The area thus outlined was incised deep to adipose tissue with a #15 scalpel blade.  The skin margins were undermined to an appropriate distance in all directions around the primary defect and laterally outward around the island pedicle utilizing iris scissors.  There was minimal undermining beneath the pedicle flap.
Complex Repair And Dermal Autograft Text: The defect edges were debeveled with a #15 scalpel blade.  The primary defect was closed partially with a complex linear closure.  Given the location of the defect, shape of the defect and the proximity to free margins an dermal autograft was deemed most appropriate to repair the remaining defect.  The graft was trimmed to fit the size of the remaining defect.  The graft was then placed in the primary defect, oriented appropriately, and sutured into place.
O-Z Flap Text: The defect edges were debeveled with a #15 scalpel blade.  Given the location of the defect, shape of the defect and the proximity to free margins an O-Z flap was deemed most appropriate.  Using a sterile surgical marker, an appropriate transposition flap was drawn incorporating the defect and placing the expected incisions within the relaxed skin tension lines where possible. The area thus outlined was incised deep to adipose tissue with a #15 scalpel blade.  The skin margins were undermined to an appropriate distance in all directions utilizing iris scissors.
Cheek-To-Nose Interpolation Flap Text: A decision was made to reconstruct the defect utilizing an interpolation axial flap and a staged reconstruction.  A telfa template was made of the defect.  This telfa template was then used to outline the Cheek-To-Nose Interpolation flap.  The donor area for the pedicle flap was then injected with anesthesia.  The flap was excised through the skin and subcutaneous tissue down to the layer of the underlying musculature.  The interpolation flap was carefully excised within this deep plane to maintain its blood supply.  The edges of the donor site were undermined.   The donor site was closed in a primary fashion.  The pedicle was then rotated into position and sutured.  Once the tube was sutured into place, adequate blood supply was confirmed with blanching and refill.  The pedicle was then wrapped with xeroform gauze and dressed appropriately with a telfa and gauze bandage to ensure continued blood supply and protect the attached pedicle.
Burow's Advancement Flap Text: The defect edges were debeveled with a #15 scalpel blade.  Given the location of the defect and the proximity to free margins a Burow's advancement flap was deemed most appropriate.  Using a sterile surgical marker, the appropriate advancement flap was drawn incorporating the defect and placing the expected incisions within the relaxed skin tension lines where possible.    The area thus outlined was incised deep to adipose tissue with a #15 scalpel blade.  The skin margins were undermined to an appropriate distance in all directions utilizing iris scissors.
Where Do You Want The Question To Include Opioid Counseling Located?: Case Summary Tab
M-Plasty Intermediate Repair Preamble Text (Leave Blank If You Do Not Want): Undermining was performed with blunt dissection.
Complex Repair And M Plasty Text: The defect edges were debeveled with a #15 scalpel blade.  The primary defect was closed partially with a complex linear closure.  Given the location of the remaining defect, shape of the defect and the proximity to free margins an M plasty was deemed most appropriate for complete closure of the defect.  Using a sterile surgical marker, an appropriate advancement flap was drawn incorporating the defect and placing the expected incisions within the relaxed skin tension lines where possible.    The area thus outlined was incised deep to adipose tissue with a #15 scalpel blade.  The skin margins were undermined to an appropriate distance in all directions utilizing iris scissors.
Anesthesia Type: 1% lidocaine with epinephrine
Complex Repair And Bilobe Flap Text: The defect edges were debeveled with a #15 scalpel blade.  The primary defect was closed partially with a complex linear closure.  Given the location of the remaining defect, shape of the defect and the proximity to free margins a bilobe flap was deemed most appropriate for complete closure of the defect.  Using a sterile surgical marker, an appropriate advancement flap was drawn incorporating the defect and placing the expected incisions within the relaxed skin tension lines where possible.    The area thus outlined was incised deep to adipose tissue with a #15 scalpel blade.  The skin margins were undermined to an appropriate distance in all directions utilizing iris scissors.
Peng Advancement Flap Text: The defect edges were debeveled with a #15 scalpel blade.  Given the location of the defect, shape of the defect and the proximity to free margins a Peng advancement flap was deemed most appropriate.  Using a sterile surgical marker, an appropriate advancement flap was drawn incorporating the defect and placing the expected incisions within the relaxed skin tension lines where possible. The area thus outlined was incised deep to adipose tissue with a #15 scalpel blade.  The skin margins were undermined to an appropriate distance in all directions utilizing iris scissors.
Scalpel Size: 15 blade
Suturegard Body: The suture ends were repeatedly re-tightened and re-clamped to achieve the desired tissue expansion.
Consent was obtained from the patient. The risks and benefits to therapy were discussed in detail. Specifically, the risks of infection, scarring, bleeding, prolonged wound healing, incomplete removal, allergy to anesthesia, nerve injury and recurrence were addressed. Prior to the procedure, the treatment site was clearly identified and confirmed by the patient. All components of Universal Protocol/PAUSE Rule completed.
Z Plasty Text: The lesion was extirpated to the level of the fat with a #15 scalpel blade.  Given the location of the defect, shape of the defect and the proximity to free margins a Z-plasty was deemed most appropriate for repair.  Using a sterile surgical marker, the appropriate transposition arms of the Z-plasty were drawn incorporating the defect and placing the expected incisions within the relaxed skin tension lines where possible.    The area thus outlined was incised deep to adipose tissue with a #15 scalpel blade.  The skin margins were undermined to an appropriate distance in all directions utilizing iris scissors.  The opposing transposition arms were then transposed into place in opposite direction and anchored with interrupted buried subcutaneous sutures.
Estimated Blood Loss (Cc): minimal
O-Z Plasty Text: The defect edges were debeveled with a #15 scalpel blade.  Given the location of the defect, shape of the defect and the proximity to free margins an O-Z plasty (double transposition flap) was deemed most appropriate.  Using a sterile surgical marker, the appropriate transposition flaps were drawn incorporating the defect and placing the expected incisions within the relaxed skin tension lines where possible.    The area thus outlined was incised deep to adipose tissue with a #15 scalpel blade.  The skin margins were undermined to an appropriate distance in all directions utilizing iris scissors.  Hemostasis was achieved with electrocautery.  The flaps were then transposed into place, one clockwise and the other counterclockwise, and anchored with interrupted buried subcutaneous sutures.
Post-Care Instructions: I reviewed with the patient in detail post-care instructions. Patient is not to engage in any heavy lifting, exercise, or swimming for the next 7 days. Should the patient develop any fevers, chills, bleeding, severe pain patient will contact the office immediately.
Ear Star Wedge Flap Text: The defect edges were debeveled with a #15 blade scalpel.  Given the location of the defect and the proximity to free margins (helical rim) an ear star wedge flap was deemed most appropriate.  Using a sterile surgical marker, the appropriate flap was drawn incorporating the defect and placing the expected incisions between the helical rim and antihelix where possible.  The area thus outlined was incised through and through with a #15 scalpel blade.
Rhombic Flap Text: The defect edges were debeveled with a #15 scalpel blade.  Given the location of the defect and the proximity to free margins a rhombic flap was deemed most appropriate.  Using a sterile surgical marker, an appropriate rhombic flap was drawn incorporating the defect.    The area thus outlined was incised deep to adipose tissue with a #15 scalpel blade.  The skin margins were undermined to an appropriate distance in all directions utilizing iris scissors.
Graft Donor Site Bandage (Optional-Leave Blank If You Don't Want In Note): Steri-strips and a pressure bandage were applied to the donor site.
Partial Purse String (Intermediate) Text: Given the location of the defect and the characteristics of the surrounding skin an intermediate purse string closure was deemed most appropriate.  Undermining was performed circumferentially around the surgical defect.  A purse string suture was then placed and tightened. Wound tension of the circular defect prevented complete closure of the wound.
Dermal Autograft Text: The defect edges were debeveled with a #15 scalpel blade.  Given the location of the defect, shape of the defect and the proximity to free margins a dermal autograft was deemed most appropriate.  Using a sterile surgical marker, the primary defect shape was transferred to the donor site. The area thus outlined was incised deep to adipose tissue with a #15 scalpel blade.  The harvested graft was then trimmed of adipose and epidermal tissue until only dermis was left.  The skin graft was then placed in the primary defect and oriented appropriately.
Modified Advancement Flap Text: The defect edges were debeveled with a #15 scalpel blade.  Given the location of the defect, shape of the defect and the proximity to free margins a modified advancement flap was deemed most appropriate.  Using a sterile surgical marker, an appropriate advancement flap was drawn incorporating the defect and placing the expected incisions within the relaxed skin tension lines where possible.    The area thus outlined was incised deep to adipose tissue with a #15 scalpel blade.  The skin margins were undermined to an appropriate distance in all directions utilizing iris scissors.
O-T Advancement Flap Text: The defect edges were debeveled with a #15 scalpel blade.  Given the location of the defect, shape of the defect and the proximity to free margins an O-T advancement flap was deemed most appropriate.  Using a sterile surgical marker, an appropriate advancement flap was drawn incorporating the defect and placing the expected incisions within the relaxed skin tension lines where possible.    The area thus outlined was incised deep to adipose tissue with a #15 scalpel blade.  The skin margins were undermined to an appropriate distance in all directions utilizing iris scissors.
Advancement Flap (Single) Text: The defect edges were debeveled with a #15 scalpel blade.  Given the location of the defect and the proximity to free margins a single advancement flap was deemed most appropriate.  Using a sterile surgical marker, an appropriate advancement flap was drawn incorporating the defect and placing the expected incisions within the relaxed skin tension lines where possible.    The area thus outlined was incised deep to adipose tissue with a #15 scalpel blade.  The skin margins were undermined to an appropriate distance in all directions utilizing iris scissors.
A-T Advancement Flap Text: The defect edges were debeveled with a #15 scalpel blade.  Given the location of the defect, shape of the defect and the proximity to free margins an A-T advancement flap was deemed most appropriate.  Using a sterile surgical marker, an appropriate advancement flap was drawn incorporating the defect and placing the expected incisions within the relaxed skin tension lines where possible.    The area thus outlined was incised deep to adipose tissue with a #15 scalpel blade.  The skin margins were undermined to an appropriate distance in all directions utilizing iris scissors.
Helical Rim Text: The closure involved the helical rim.
Epidermal Closure Graft Donor Site (Optional): simple interrupted
Complex Repair And Double M Plasty Text: The defect edges were debeveled with a #15 scalpel blade.  The primary defect was closed partially with a complex linear closure.  Given the location of the remaining defect, shape of the defect and the proximity to free margins a double M plasty was deemed most appropriate for complete closure of the defect.  Using a sterile surgical marker, an appropriate advancement flap was drawn incorporating the defect and placing the expected incisions within the relaxed skin tension lines where possible.    The area thus outlined was incised deep to adipose tissue with a #15 scalpel blade.  The skin margins were undermined to an appropriate distance in all directions utilizing iris scissors.
Complex Repair And Melolabial Flap Text: The defect edges were debeveled with a #15 scalpel blade.  The primary defect was closed partially with a complex linear closure.  Given the location of the remaining defect, shape of the defect and the proximity to free margins a melolabial flap was deemed most appropriate for complete closure of the defect.  Using a sterile surgical marker, an appropriate advancement flap was drawn incorporating the defect and placing the expected incisions within the relaxed skin tension lines where possible.    The area thus outlined was incised deep to adipose tissue with a #15 scalpel blade.  The skin margins were undermined to an appropriate distance in all directions utilizing iris scissors.
Saucerization Excision Additional Text (Leave Blank If You Do Not Want): The margin was drawn around the clinically apparent lesion.  Incisions were then made along these lines, in a tangential fashion, to the appropriate tissue plane and the lesion was extirpated.
Complex Repair And Tissue Cultured Epidermal Autograft Text: The defect edges were debeveled with a #15 scalpel blade.  The primary defect was closed partially with a complex linear closure.  Given the location of the defect, shape of the defect and the proximity to free margins an tissue cultured epidermal autograft was deemed most appropriate to repair the remaining defect.  The graft was trimmed to fit the size of the remaining defect.  The graft was then placed in the primary defect, oriented appropriately, and sutured into place.
Deep Sutures: 4-0 Vicryl
V-Y Plasty Text: The defect edges were debeveled with a #15 scalpel blade.  Given the location of the defect, shape of the defect and the proximity to free margins an V-Y advancement flap was deemed most appropriate.  Using a sterile surgical marker, an appropriate advancement flap was drawn incorporating the defect and placing the expected incisions within the relaxed skin tension lines where possible.    The area thus outlined was incised deep to adipose tissue with a #15 scalpel blade.  The skin margins were undermined to an appropriate distance in all directions utilizing iris scissors.
Trilobed Flap Text: The defect edges were debeveled with a #15 scalpel blade.  Given the location of the defect and the proximity to free margins a trilobed flap was deemed most appropriate.  Using a sterile surgical marker, an appropriate trilobed flap drawn around the defect.    The area thus outlined was incised deep to adipose tissue with a #15 scalpel blade.  The skin margins were undermined to an appropriate distance in all directions utilizing iris scissors.
Island Pedicle Flap-Requiring Vessel Identification Text: The defect edges were debeveled with a #15 scalpel blade.  Given the location of the defect, shape of the defect and the proximity to free margins an island pedicle advancement flap was deemed most appropriate.  Using a sterile surgical marker, an appropriate advancement flap was drawn, based on the axial vessel mentioned above, incorporating the defect, outlining the appropriate donor tissue and placing the expected incisions within the relaxed skin tension lines where possible.    The area thus outlined was incised deep to adipose tissue with a #15 scalpel blade.  The skin margins were undermined to an appropriate distance in all directions around the primary defect and laterally outward around the island pedicle utilizing iris scissors.  There was minimal undermining beneath the pedicle flap.
Pre-Excision Curettage Text (Leave Blank If You Do Not Want): Prior to drawing the surgical margin the visible lesion was removed with electrodesiccation and curettage to clearly define the lesion size.
Epidermal Sutures: 4-0 Prolene
Information: Selecting Yes will display possible errors in your note based on the variables you have selected. This validation is only offered as a suggestion for you. PLEASE NOTE THAT THE VALIDATION TEXT WILL BE REMOVED WHEN YOU FINALIZE YOUR NOTE. IF YOU WANT TO FAX A PRELIMINARY NOTE YOU WILL NEED TO TOGGLE THIS TO 'NO' IF YOU DO NOT WANT IT IN YOUR FAXED NOTE.
Island Pedicle Flap With Canthal Suspension Text: The defect edges were debeveled with a #15 scalpel blade.  Given the location of the defect, shape of the defect and the proximity to free margins an island pedicle advancement flap was deemed most appropriate.  Using a sterile surgical marker, an appropriate advancement flap was drawn incorporating the defect, outlining the appropriate donor tissue and placing the expected incisions within the relaxed skin tension lines where possible. The area thus outlined was incised deep to adipose tissue with a #15 scalpel blade.  The skin margins were undermined to an appropriate distance in all directions around the primary defect and laterally outward around the island pedicle utilizing iris scissors.  There was minimal undermining beneath the pedicle flap. A suspension suture was placed in the canthal tendon to prevent tension and prevent ectropion.
Complex Repair And Split-Thickness Skin Graft Text: The defect edges were debeveled with a #15 scalpel blade.  The primary defect was closed partially with a complex linear closure.  Given the location of the defect, shape of the defect and the proximity to free margins a split thickness skin graft was deemed most appropriate to repair the remaining defect.  The graft was trimmed to fit the size of the remaining defect.  The graft was then placed in the primary defect, oriented appropriately, and sutured into place.
Anesthesia Volume In Cc: 5
Spiral Flap Text: The defect edges were debeveled with a #15 scalpel blade.  Given the location of the defect, shape of the defect and the proximity to free margins a spiral flap was deemed most appropriate.  Using a sterile surgical marker, an appropriate rotation flap was drawn incorporating the defect and placing the expected incisions within the relaxed skin tension lines where possible. The area thus outlined was incised deep to adipose tissue with a #15 scalpel blade.  The skin margins were undermined to an appropriate distance in all directions utilizing iris scissors.
Complex Repair And Double Advancement Flap Text: The defect edges were debeveled with a #15 scalpel blade.  The primary defect was closed partially with a complex linear closure.  Given the location of the remaining defect, shape of the defect and the proximity to free margins a double advancement flap was deemed most appropriate for complete closure of the defect.  Using a sterile surgical marker, an appropriate advancement flap was drawn incorporating the defect and placing the expected incisions within the relaxed skin tension lines where possible.    The area thus outlined was incised deep to adipose tissue with a #15 scalpel blade.  The skin margins were undermined to an appropriate distance in all directions utilizing iris scissors.
Xenograft Text: The defect edges were debeveled with a #15 scalpel blade.  Given the location of the defect, shape of the defect and the proximity to free margins a xenograft was deemed most appropriate.  The graft was then trimmed to fit the size of the defect.  The graft was then placed in the primary defect and oriented appropriately.
Dressing: pressure dressing
Cartilage Graft Text: The defect edges were debeveled with a #15 scalpel blade.  Given the location of the defect, shape of the defect, the fact the defect involved a full thickness cartilage defect a cartilage graft was deemed most appropriate.  An appropriate donor site was identified, cleansed, and anesthetized. The cartilage graft was then harvested and transferred to the recipient site, oriented appropriately and then sutured into place.  The secondary defect was then repaired using a primary closure.
Epidermal Closure: running
Interpolation Flap Text: A decision was made to reconstruct the defect utilizing an interpolation axial flap and a staged reconstruction.  A telfa template was made of the defect.  This telfa template was then used to outline the interpolation flap.  The donor area for the pedicle flap was then injected with anesthesia.  The flap was excised through the skin and subcutaneous tissue down to the layer of the underlying musculature.  The interpolation flap was carefully excised within this deep plane to maintain its blood supply.  The edges of the donor site were undermined.   The donor site was closed in a primary fashion.  The pedicle was then rotated into position and sutured.  Once the tube was sutured into place, adequate blood supply was confirmed with blanching and refill.  The pedicle was then wrapped with xeroform gauze and dressed appropriately with a telfa and gauze bandage to ensure continued blood supply and protect the attached pedicle.
Complex Repair And O-T Advancement Flap Text: The defect edges were debeveled with a #15 scalpel blade.  The primary defect was closed partially with a complex linear closure.  Given the location of the remaining defect, shape of the defect and the proximity to free margins an O-T advancement flap was deemed most appropriate for complete closure of the defect.  Using a sterile surgical marker, an appropriate advancement flap was drawn incorporating the defect and placing the expected incisions within the relaxed skin tension lines where possible.    The area thus outlined was incised deep to adipose tissue with a #15 scalpel blade.  The skin margins were undermined to an appropriate distance in all directions utilizing iris scissors.
Excision Depth: adipose tissue
Repair Type: Intermediate
Size Of Margin In Cm: 0.2
Ftsg Text: The defect edges were debeveled with a #15 scalpel blade.  Given the location of the defect, shape of the defect and the proximity to free margins a full thickness skin graft was deemed most appropriate.  Using a sterile surgical marker, the primary defect shape was transferred to the donor site. The area thus outlined was incised deep to adipose tissue with a #15 scalpel blade.  The harvested graft was then trimmed of adipose tissue until only dermis and epidermis was left.  The skin margins of the secondary defect were undermined to an appropriate distance in all directions utilizing iris scissors.  The secondary defect was closed with interrupted buried subcutaneous sutures.  The skin edges were then re-apposed with running  sutures.  The skin graft was then placed in the primary defect and oriented appropriately.
Advancement-Rotation Flap Text: The defect edges were debeveled with a #15 scalpel blade.  Given the location of the defect, shape of the defect and the proximity to free margins an advancement-rotation flap was deemed most appropriate.  Using a sterile surgical marker, an appropriate flap was drawn incorporating the defect and placing the expected incisions within the relaxed skin tension lines where possible. The area thus outlined was incised deep to adipose tissue with a #15 scalpel blade.  The skin margins were undermined to an appropriate distance in all directions utilizing iris scissors.
Zygomaticofacial Flap Text: Given the location of the defect, shape of the defect and the proximity to free margins a zygomaticofacial flap was deemed most appropriate for repair.  Using a sterile surgical marker, the appropriate flap was drawn incorporating the defect and placing the expected incisions within the relaxed skin tension lines where possible. The area thus outlined was incised deep to adipose tissue with a #15 scalpel blade with preservation of a vascular pedicle.  The skin margins were undermined to an appropriate distance in all directions utilizing iris scissors.  The flap was then placed into the defect and anchored with interrupted buried subcutaneous sutures.
Complex Repair And Dorsal Nasal Flap Text: The defect edges were debeveled with a #15 scalpel blade.  The primary defect was closed partially with a complex linear closure.  Given the location of the remaining defect, shape of the defect and the proximity to free margins a dorsal nasal flap was deemed most appropriate for complete closure of the defect.  Using a sterile surgical marker, an appropriate flap was drawn incorporating the defect and placing the expected incisions within the relaxed skin tension lines where possible.    The area thus outlined was incised deep to adipose tissue with a #15 scalpel blade.  The skin margins were undermined to an appropriate distance in all directions utilizing iris scissors.
Repair Performed By Another Provider Text (Leave Blank If You Do Not Want): After the tissue was excised the defect was repaired by another provider.
Complex Repair And Transposition Flap Text: The defect edges were debeveled with a #15 scalpel blade.  The primary defect was closed partially with a complex linear closure.  Given the location of the remaining defect, shape of the defect and the proximity to free margins a transposition flap was deemed most appropriate for complete closure of the defect.  Using a sterile surgical marker, an appropriate advancement flap was drawn incorporating the defect and placing the expected incisions within the relaxed skin tension lines where possible.    The area thus outlined was incised deep to adipose tissue with a #15 scalpel blade.  The skin margins were undermined to an appropriate distance in all directions utilizing iris scissors.
Fusiform Excision Additional Text (Leave Blank If You Do Not Want): The margin was drawn around the clinically apparent lesion.  A fusiform shape was then drawn on the skin incorporating the lesion and margins.  Incisions were then made along these lines to the appropriate tissue plane and the lesion was extirpated.
Helical Rim Advancement Flap Text: The defect edges were debeveled with a #15 blade scalpel.  Given the location of the defect and the proximity to free margins (helical rim) a double helical rim advancement flap was deemed most appropriate.  Using a sterile surgical marker, the appropriate advancement flaps were drawn incorporating the defect and placing the expected incisions between the helical rim and antihelix where possible.  The area thus outlined was incised through and through with a #15 scalpel blade.  With a skin hook and iris scissors, the flaps were gently and sharply undermined and freed up.
Posterior Auricular Interpolation Flap Text: A decision was made to reconstruct the defect utilizing an interpolation axial flap and a staged reconstruction.  A telfa template was made of the defect.  This telfa template was then used to outline the posterior auricular interpolation flap.  The donor area for the pedicle flap was then injected with anesthesia.  The flap was excised through the skin and subcutaneous tissue down to the layer of the underlying musculature.  The pedicle flap was carefully excised within this deep plane to maintain its blood supply.  The edges of the donor site were undermined.   The donor site was closed in a primary fashion.  The pedicle was then rotated into position and sutured.  Once the tube was sutured into place, adequate blood supply was confirmed with blanching and refill.  The pedicle was then wrapped with xeroform gauze and dressed appropriately with a telfa and gauze bandage to ensure continued blood supply and protect the attached pedicle.
Muscle Hinge Flap Text: The defect edges were debeveled with a #15 scalpel blade.  Given the size, depth and location of the defect and the proximity to free margins a muscle hinge flap was deemed most appropriate.  Using a sterile surgical marker, an appropriate hinge flap was drawn incorporating the defect. The area thus outlined was incised with a #15 scalpel blade.  The skin margins were undermined to an appropriate distance in all directions utilizing iris scissors.
Detail Level: Detailed
Hemostasis: Electrocautery
H Plasty Text: Given the location of the defect, shape of the defect and the proximity to free margins a H-plasty was deemed most appropriate for repair.  Using a sterile surgical marker, the appropriate advancement arms of the H-plasty were drawn incorporating the defect and placing the expected incisions within the relaxed skin tension lines where possible. The area thus outlined was incised deep to adipose tissue with a #15 scalpel blade. The skin margins were undermined to an appropriate distance in all directions utilizing iris scissors.  The opposing advancement arms were then advanced into place in opposite direction and anchored with interrupted buried subcutaneous sutures.
Hemigard Retention Suture: 2-0 Nylon
Accession #: [id number].
Suture Removal: 14 days
Perilesional Excision Additional Text (Leave Blank If You Do Not Want): The margin was drawn around the clinically apparent lesion. Incisions were then made along these lines to the appropriate tissue plane and the lesion was extirpated.
Complex Repair And Modified Advancement Flap Text: The defect edges were debeveled with a #15 scalpel blade.  The primary defect was closed partially with a complex linear closure.  Given the location of the remaining defect, shape of the defect and the proximity to free margins a modified advancement flap was deemed most appropriate for complete closure of the defect.  Using a sterile surgical marker, an appropriate advancement flap was drawn incorporating the defect and placing the expected incisions within the relaxed skin tension lines where possible.    The area thus outlined was incised deep to adipose tissue with a #15 scalpel blade.  The skin margins were undermined to an appropriate distance in all directions utilizing iris scissors.
Purse String (Intermediate) Text: Given the location of the defect and the characteristics of the surrounding skin a pursestring intermediate closure was deemed most appropriate.  Undermining was performed circumfirentially around the surgical defect.  A purstring suture was then placed and tightened.
Length To Time In Minutes Device Was In Place: 10
Keystone Flap Text: The defect edges were debeveled with a #15 scalpel blade.  Given the location of the defect, shape of the defect a keystone flap was deemed most appropriate.  Using a sterile surgical marker, an appropriate keystone flap was drawn incorporating the defect, outlining the appropriate donor tissue and placing the expected incisions within the relaxed skin tension lines where possible. The area thus outlined was incised deep to adipose tissue with a #15 scalpel blade.  The skin margins were undermined to an appropriate distance in all directions around the primary defect and laterally outward around the flap utilizing iris scissors.
Hatchet Flap Text: The defect edges were debeveled with a #15 scalpel blade.  Given the location of the defect, shape of the defect and the proximity to free margins a hatchet flap was deemed most appropriate.  Using a sterile surgical marker, an appropriate hatchet flap was drawn incorporating the defect and placing the expected incisions within the relaxed skin tension lines where possible.    The area thus outlined was incised deep to adipose tissue with a #15 scalpel blade.  The skin margins were undermined to an appropriate distance in all directions utilizing iris scissors.
Double O-Z Flap Text: The defect edges were debeveled with a #15 scalpel blade.  Given the location of the defect, shape of the defect and the proximity to free margins a Double O-Z flap was deemed most appropriate.  Using a sterile surgical marker, an appropriate transposition flap was drawn incorporating the defect and placing the expected incisions within the relaxed skin tension lines where possible. The area thus outlined was incised deep to adipose tissue with a #15 scalpel blade.  The skin margins were undermined to an appropriate distance in all directions utilizing iris scissors.
Hemigard Intro: Due to skin fragility and wound tension, it was decided to use HEMIGARD adhesive retention suture devices to permit a linear closure. The skin was cleaned and dried for a 6cm distance away from the wound. Excessive hair, if present, was removed to allow for adhesion.
Chonodrocutaneous Helical Advancement Flap Text: The defect edges were debeveled with a #15 scalpel blade.  Given the location of the defect and the proximity to free margins a chondrocutaneous helical advancement flap was deemed most appropriate.  Using a sterile surgical marker, the appropriate advancement flap was drawn incorporating the defect and placing the expected incisions within the relaxed skin tension lines where possible.    The area thus outlined was incised deep to adipose tissue with a #15 scalpel blade.  The skin margins were undermined to an appropriate distance in all directions utilizing iris scissors.
Complex Repair And Epidermal Autograft Text: The defect edges were debeveled with a #15 scalpel blade.  The primary defect was closed partially with a complex linear closure.  Given the location of the defect, shape of the defect and the proximity to free margins an epidermal autograft was deemed most appropriate to repair the remaining defect.  The graft was trimmed to fit the size of the remaining defect.  The graft was then placed in the primary defect, oriented appropriately, and sutured into place.
Double O-Z Plasty Text: The defect edges were debeveled with a #15 scalpel blade.  Given the location of the defect, shape of the defect and the proximity to free margins a Double O-Z plasty (double transposition flap) was deemed most appropriate.  Using a sterile surgical marker, the appropriate transposition flaps were drawn incorporating the defect and placing the expected incisions within the relaxed skin tension lines where possible. The area thus outlined was incised deep to adipose tissue with a #15 scalpel blade.  The skin margins were undermined to an appropriate distance in all directions utilizing iris scissors.  Hemostasis was achieved with electrocautery.  The flaps were then transposed into place, one clockwise and the other counterclockwise, and anchored with interrupted buried subcutaneous sutures.
Bilobed Flap Text: The defect edges were debeveled with a #15 scalpel blade.  Given the location of the defect and the proximity to free margins a bilobe flap was deemed most appropriate.  Using a sterile surgical marker, an appropriate bilobe flap drawn around the defect.    The area thus outlined was incised deep to adipose tissue with a #15 scalpel blade.  The skin margins were undermined to an appropriate distance in all directions utilizing iris scissors.
Alar Island Pedicle Flap Text: The defect edges were debeveled with a #15 scalpel blade.  Given the location of the defect, shape of the defect and the proximity to the alar rim an island pedicle advancement flap was deemed most appropriate.  Using a sterile surgical marker, an appropriate advancement flap was drawn incorporating the defect, outlining the appropriate donor tissue and placing the expected incisions within the nasal ala running parallel to the alar rim. The area thus outlined was incised with a #15 scalpel blade.  The skin margins were undermined minimally to an appropriate distance in all directions around the primary defect and laterally outward around the island pedicle utilizing iris scissors.  There was minimal undermining beneath the pedicle flap.
Vermilion Border Text: The closure involved the vermilion border.
Home Suture Removal Text: Patient was provided a home suture removal kit and will remove their sutures at home.  If they have any questions or difficulties they will call the office.
Billing Type: Third-Party Bill
Split-Thickness Skin Graft Text: The defect edges were debeveled with a #15 scalpel blade.  Given the location of the defect, shape of the defect and the proximity to free margins a split thickness skin graft was deemed most appropriate.  Using a sterile surgical marker, the primary defect shape was transferred to the donor site. The split thickness graft was then harvested.  The skin graft was then placed in the primary defect and oriented appropriately.
Rhomboid Transposition Flap Text: The defect edges were debeveled with a #15 scalpel blade.  Given the location of the defect and the proximity to free margins a rhomboid transposition flap was deemed most appropriate.  Using a sterile surgical marker, an appropriate rhomboid flap was drawn incorporating the defect.    The area thus outlined was incised deep to adipose tissue with a #15 scalpel blade.  The skin margins were undermined to an appropriate distance in all directions utilizing iris scissors.
Banner Transposition Flap Text: The defect edges were debeveled with a #15 scalpel blade.  Given the location of the defect and the proximity to free margins a Banner transposition flap was deemed most appropriate.  Using a sterile surgical marker, an appropriate flap drawn around the defect. The area thus outlined was incised deep to adipose tissue with a #15 scalpel blade.  The skin margins were undermined to an appropriate distance in all directions utilizing iris scissors.
Composite Graft Text: The defect edges were debeveled with a #15 scalpel blade.  Given the location of the defect, shape of the defect, the proximity to free margins and the fact the defect was full thickness a composite graft was deemed most appropriate.  The defect was outline and then transferred to the donor site.  A full thickness graft was then excised from the donor site. The graft was then placed in the primary defect, oriented appropriately and then sutured into place.  The secondary defect was then repaired using a primary closure.
Debridement Text: The wound edges were debrided prior to proceeding with the closure to facilitate wound healing.
Size Of Lesion In Cm: 1.1
Suturegard Intro: Intraoperative tissue expansion was performed, utilizing the SUTUREGARD device, in order to reduce wound tension.
Undermining Type: Entire Wound
Mucosal Advancement Flap Text: Given the location of the defect, shape of the defect and the proximity to free margins a mucosal advancement flap was deemed most appropriate. Incisions were made with a 15 blade scalpel in the appropriate fashion along the cutaneous vermillion border and the mucosal lip. The remaining actinically damaged mucosal tissue was excised.  The mucosal advancement flap was then elevated to the gingival sulcus with care taken to preserve the neurovascular structures and advanced into the primary defect. Care was taken to ensure that precise realignment of the vermillion border was achieved.
Skin Substitute Text: The defect edges were debeveled with a #15 scalpel blade.  Given the location of the defect, shape of the defect and the proximity to free margins a skin substitute graft was deemed most appropriate.  The graft material was trimmed to fit the size of the defect. The graft was then placed in the primary defect and oriented appropriately.
Wound Care: Vaseline
O-L Flap Text: The defect edges were debeveled with a #15 scalpel blade.  Given the location of the defect, shape of the defect and the proximity to free margins an O-L flap was deemed most appropriate.  Using a sterile surgical marker, an appropriate advancement flap was drawn incorporating the defect and placing the expected incisions within the relaxed skin tension lines where possible.    The area thus outlined was incised deep to adipose tissue with a #15 scalpel blade.  The skin margins were undermined to an appropriate distance in all directions utilizing iris scissors.
Advancement Flap (Double) Text: The defect edges were debeveled with a #15 scalpel blade.  Given the location of the defect and the proximity to free margins a double advancement flap was deemed most appropriate.  Using a sterile surgical marker, the appropriate advancement flaps were drawn incorporating the defect and placing the expected incisions within the relaxed skin tension lines where possible.    The area thus outlined was incised deep to adipose tissue with a #15 scalpel blade.  The skin margins were undermined to an appropriate distance in all directions utilizing iris scissors.
Slit Excision Additional Text (Leave Blank If You Do Not Want): A linear line was drawn on the skin overlying the lesion. An incision was made slowly until the lesion was visualized.  Once visualized, the lesion was removed with blunt dissection.
Complex Repair And Ftsg Text: The defect edges were debeveled with a #15 scalpel blade.  The primary defect was closed partially with a complex linear closure.  Given the location of the defect, shape of the defect and the proximity to free margins a full thickness skin graft was deemed most appropriate to repair the remaining defect.  The graft was trimmed to fit the size of the remaining defect.  The graft was then placed in the primary defect, oriented appropriately, and sutured into place.
Complex Repair And V-Y Plasty Text: The defect edges were debeveled with a #15 scalpel blade.  The primary defect was closed partially with a complex linear closure.  Given the location of the remaining defect, shape of the defect and the proximity to free margins a V-Y plasty was deemed most appropriate for complete closure of the defect.  Using a sterile surgical marker, an appropriate advancement flap was drawn incorporating the defect and placing the expected incisions within the relaxed skin tension lines where possible.    The area thus outlined was incised deep to adipose tissue with a #15 scalpel blade.  The skin margins were undermined to an appropriate distance in all directions utilizing iris scissors.
Complex Repair And O-L Flap Text: The defect edges were debeveled with a #15 scalpel blade.  The primary defect was closed partially with a complex linear closure.  Given the location of the remaining defect, shape of the defect and the proximity to free margins an O-L flap was deemed most appropriate for complete closure of the defect.  Using a sterile surgical marker, an appropriate flap was drawn incorporating the defect and placing the expected incisions within the relaxed skin tension lines where possible.    The area thus outlined was incised deep to adipose tissue with a #15 scalpel blade.  The skin margins were undermined to an appropriate distance in all directions utilizing iris scissors.
Partial Purse String (Simple) Text: Given the location of the defect and the characteristics of the surrounding skin a simple purse string closure was deemed most appropriate.  Undermining was performed circumferentially around the surgical defect.  A purse string suture was then placed and tightened. Wound tension of the circular defect prevented complete closure of the wound.
Paramedian Forehead Flap Text: A decision was made to reconstruct the defect utilizing an interpolation axial flap and a staged reconstruction.  A telfa template was made of the defect.  This telfa template was then used to outline the paramedian forehead pedicle flap.  The donor area for the pedicle flap was then injected with anesthesia.  The flap was excised through the skin and subcutaneous tissue down to the layer of the underlying musculature.  The pedicle flap was carefully excised within this deep plane to maintain its blood supply.  The edges of the donor site were undermined.   The donor site was closed in a primary fashion.  The pedicle was then rotated into position and sutured.  Once the tube was sutured into place, adequate blood supply was confirmed with blanching and refill.  The pedicle was then wrapped with xeroform gauze and dressed appropriately with a telfa and gauze bandage to ensure continued blood supply and protect the attached pedicle.
Complex Repair And W Plasty Text: The defect edges were debeveled with a #15 scalpel blade.  The primary defect was closed partially with a complex linear closure.  Given the location of the remaining defect, shape of the defect and the proximity to free margins a W plasty was deemed most appropriate for complete closure of the defect.  Using a sterile surgical marker, an appropriate advancement flap was drawn incorporating the defect and placing the expected incisions within the relaxed skin tension lines where possible.    The area thus outlined was incised deep to adipose tissue with a #15 scalpel blade.  The skin margins were undermined to an appropriate distance in all directions utilizing iris scissors.
W Plasty Text: The lesion was extirpated to the level of the fat with a #15 scalpel blade.  Given the location of the defect, shape of the defect and the proximity to free margins a W-plasty was deemed most appropriate for repair.  Using a sterile surgical marker, the appropriate transposition arms of the W-plasty were drawn incorporating the defect and placing the expected incisions within the relaxed skin tension lines where possible.    The area thus outlined was incised deep to adipose tissue with a #15 scalpel blade.  The skin margins were undermined to an appropriate distance in all directions utilizing iris scissors.  The opposing transposition arms were then transposed into place in opposite direction and anchored with interrupted buried subcutaneous sutures.
Complex Repair And Rotation Flap Text: The defect edges were debeveled with a #15 scalpel blade.  The primary defect was closed partially with a complex linear closure.  Given the location of the remaining defect, shape of the defect and the proximity to free margins a rotation flap was deemed most appropriate for complete closure of the defect.  Using a sterile surgical marker, an appropriate advancement flap was drawn incorporating the defect and placing the expected incisions within the relaxed skin tension lines where possible.    The area thus outlined was incised deep to adipose tissue with a #15 scalpel blade.  The skin margins were undermined to an appropriate distance in all directions utilizing iris scissors.
Dorsal Nasal Flap Text: The defect edges were debeveled with a #15 scalpel blade.  Given the location of the defect and the proximity to free margins a dorsal nasal flap was deemed most appropriate.  Using a sterile surgical marker, an appropriate dorsal nasal flap was drawn around the defect.    The area thus outlined was incised deep to adipose tissue with a #15 scalpel blade.  The skin margins were undermined to an appropriate distance in all directions utilizing iris scissors.
Bilateral Helical Rim Advancement Flap Text: The defect edges were debeveled with a #15 blade scalpel.  Given the location of the defect and the proximity to free margins (helical rim) a bilateral helical rim advancement flap was deemed most appropriate.  Using a sterile surgical marker, the appropriate advancement flaps were drawn incorporating the defect and placing the expected incisions between the helical rim and antihelix where possible.  The area thus outlined was incised through and through with a #15 scalpel blade.  With a skin hook and iris scissors, the flaps were gently and sharply undermined and freed up.

## 2020-10-12 ENCOUNTER — APPOINTMENT (RX ONLY)
Dept: URBAN - METROPOLITAN AREA CLINIC 24 | Facility: CLINIC | Age: 85
Setting detail: DERMATOLOGY
End: 2020-10-12

## 2020-10-12 VITALS — TEMPERATURE: 96.5 F

## 2020-10-12 DIAGNOSIS — Z48.01 ENCOUNTER FOR CHANGE OR REMOVAL OF SURGICAL WOUND DRESSING: ICD-10-CM

## 2020-10-12 ASSESSMENT — LOCATION DETAILED DESCRIPTION DERM: LOCATION DETAILED: RIGHT ANTERIOR MEDIAL DISTAL THIGH

## 2020-10-12 ASSESSMENT — LOCATION ZONE DERM: LOCATION ZONE: LEG

## 2020-10-12 ASSESSMENT — LOCATION SIMPLE DESCRIPTION DERM: LOCATION SIMPLE: RIGHT THIGH

## 2020-12-29 ENCOUNTER — RX ONLY (OUTPATIENT)
Age: 85
Setting detail: RX ONLY
End: 2020-12-29

## 2020-12-29 RX ORDER — BETAMETHASONE VALERATE 1 MG/G
CREAM TOPICAL
Qty: 2 | Refills: 0 | Status: ERX | COMMUNITY
Start: 2020-12-29

## 2021-02-23 ENCOUNTER — APPOINTMENT (RX ONLY)
Dept: URBAN - METROPOLITAN AREA CLINIC 24 | Facility: CLINIC | Age: 86
Setting detail: DERMATOLOGY
End: 2021-02-23

## 2021-02-23 DIAGNOSIS — L57.0 ACTINIC KERATOSIS: ICD-10-CM

## 2021-02-23 DIAGNOSIS — L81.4 OTHER MELANIN HYPERPIGMENTATION: ICD-10-CM

## 2021-02-23 DIAGNOSIS — Z85.828 PERSONAL HISTORY OF OTHER MALIGNANT NEOPLASM OF SKIN: ICD-10-CM

## 2021-02-23 DIAGNOSIS — L57.8 OTHER SKIN CHANGES DUE TO CHRONIC EXPOSURE TO NONIONIZING RADIATION: ICD-10-CM

## 2021-02-23 DIAGNOSIS — Z87.2 PERSONAL HISTORY OF DISEASES OF THE SKIN AND SUBCUTANEOUS TISSUE: ICD-10-CM

## 2021-02-23 DIAGNOSIS — L82.1 OTHER SEBORRHEIC KERATOSIS: ICD-10-CM

## 2021-02-23 PROBLEM — F41.9 ANXIETY DISORDER, UNSPECIFIED: Status: ACTIVE | Noted: 2021-02-23

## 2021-02-23 PROCEDURE — ? COUNSELING

## 2021-02-23 PROCEDURE — 17003 DESTRUCT PREMALG LES 2-14: CPT

## 2021-02-23 PROCEDURE — ? LIQUID NITROGEN

## 2021-02-23 PROCEDURE — 17000 DESTRUCT PREMALG LESION: CPT

## 2021-02-23 PROCEDURE — 99213 OFFICE O/P EST LOW 20 MIN: CPT | Mod: 25

## 2021-02-23 ASSESSMENT — LOCATION ZONE DERM
LOCATION ZONE: ARM
LOCATION ZONE: NOSE
LOCATION ZONE: TRUNK
LOCATION ZONE: SCALP
LOCATION ZONE: LEG
LOCATION ZONE: FACE
LOCATION ZONE: EAR

## 2021-02-23 ASSESSMENT — LOCATION SIMPLE DESCRIPTION DERM
LOCATION SIMPLE: RIGHT PRETIBIAL REGION
LOCATION SIMPLE: LEFT CLAVICULAR SKIN
LOCATION SIMPLE: RIGHT EAR
LOCATION SIMPLE: SCALP
LOCATION SIMPLE: NOSE
LOCATION SIMPLE: RIGHT POPLITEAL SKIN
LOCATION SIMPLE: CHEST
LOCATION SIMPLE: RIGHT TEMPLE
LOCATION SIMPLE: RIGHT CLAVICULAR SKIN
LOCATION SIMPLE: RIGHT FOREARM
LOCATION SIMPLE: ABDOMEN
LOCATION SIMPLE: LEFT UPPER BACK
LOCATION SIMPLE: LEFT FOREARM
LOCATION SIMPLE: RIGHT UPPER BACK

## 2021-02-23 ASSESSMENT — LOCATION DETAILED DESCRIPTION DERM
LOCATION DETAILED: EPIGASTRIC SKIN
LOCATION DETAILED: RIGHT MID-UPPER BACK
LOCATION DETAILED: LEFT PROXIMAL DORSAL FOREARM
LOCATION DETAILED: RIGHT SUPERIOR MEDIAL UPPER BACK
LOCATION DETAILED: RIGHT INFERIOR HELIX
LOCATION DETAILED: UPPER STERNUM
LOCATION DETAILED: LEFT CLAVICULAR SKIN
LOCATION DETAILED: NASAL DORSUM
LOCATION DETAILED: RIGHT POPLITEAL SKIN
LOCATION DETAILED: RIGHT CLAVICULAR SKIN
LOCATION DETAILED: RIGHT PROXIMAL DORSAL FOREARM
LOCATION DETAILED: RIGHT DISTAL PRETIBIAL REGION
LOCATION DETAILED: RIGHT CENTRAL TEMPLE
LOCATION DETAILED: RIGHT INCISURA INTERTRAGICA
LOCATION DETAILED: NASAL ROOT
LOCATION DETAILED: LEFT INFERIOR POSTAURICULAR SKIN
LOCATION DETAILED: LEFT SUPERIOR UPPER BACK

## 2021-09-07 ENCOUNTER — APPOINTMENT (RX ONLY)
Dept: URBAN - METROPOLITAN AREA CLINIC 24 | Facility: CLINIC | Age: 86
Setting detail: DERMATOLOGY
End: 2021-09-07

## 2021-09-07 DIAGNOSIS — L57.0 ACTINIC KERATOSIS: ICD-10-CM

## 2021-09-07 DIAGNOSIS — L82.1 OTHER SEBORRHEIC KERATOSIS: ICD-10-CM

## 2021-09-07 DIAGNOSIS — L72.8 OTHER FOLLICULAR CYSTS OF THE SKIN AND SUBCUTANEOUS TISSUE: ICD-10-CM

## 2021-09-07 DIAGNOSIS — L81.4 OTHER MELANIN HYPERPIGMENTATION: ICD-10-CM

## 2021-09-07 DIAGNOSIS — L57.8 OTHER SKIN CHANGES DUE TO CHRONIC EXPOSURE TO NONIONIZING RADIATION: ICD-10-CM

## 2021-09-07 DIAGNOSIS — D18.0 HEMANGIOMA: ICD-10-CM

## 2021-09-07 DIAGNOSIS — Z87.2 PERSONAL HISTORY OF DISEASES OF THE SKIN AND SUBCUTANEOUS TISSUE: ICD-10-CM

## 2021-09-07 DIAGNOSIS — Z85.828 PERSONAL HISTORY OF OTHER MALIGNANT NEOPLASM OF SKIN: ICD-10-CM

## 2021-09-07 PROBLEM — L40.0 PSORIASIS VULGARIS: Status: ACTIVE | Noted: 2021-09-07

## 2021-09-07 PROBLEM — I48.91 UNSPECIFIED ATRIAL FIBRILLATION: Status: ACTIVE | Noted: 2021-09-07

## 2021-09-07 PROBLEM — D18.01 HEMANGIOMA OF SKIN AND SUBCUTANEOUS TISSUE: Status: ACTIVE | Noted: 2021-09-07

## 2021-09-07 PROCEDURE — ? LIQUID NITROGEN

## 2021-09-07 PROCEDURE — ? COUNSELING

## 2021-09-07 PROCEDURE — 17000 DESTRUCT PREMALG LESION: CPT

## 2021-09-07 PROCEDURE — 17003 DESTRUCT PREMALG LES 2-14: CPT

## 2021-09-07 PROCEDURE — 99213 OFFICE O/P EST LOW 20 MIN: CPT | Mod: 25

## 2021-09-07 ASSESSMENT — LOCATION SIMPLE DESCRIPTION DERM
LOCATION SIMPLE: RIGHT POPLITEAL SKIN
LOCATION SIMPLE: RIGHT FOREHEAD
LOCATION SIMPLE: RIGHT FOREARM
LOCATION SIMPLE: RIGHT EAR
LOCATION SIMPLE: LEFT CHEEK
LOCATION SIMPLE: LEFT FOREARM
LOCATION SIMPLE: LEFT FOREHEAD
LOCATION SIMPLE: RIGHT UPPER BACK
LOCATION SIMPLE: ABDOMEN
LOCATION SIMPLE: UPPER BACK
LOCATION SIMPLE: RIGHT PRETIBIAL REGION
LOCATION SIMPLE: RIGHT ANTERIOR NECK
LOCATION SIMPLE: SCALP
LOCATION SIMPLE: RIGHT TEMPLE
LOCATION SIMPLE: NOSE

## 2021-09-07 ASSESSMENT — LOCATION DETAILED DESCRIPTION DERM
LOCATION DETAILED: LEFT INFERIOR MEDIAL FOREHEAD
LOCATION DETAILED: INFERIOR THORACIC SPINE
LOCATION DETAILED: LEFT LATERAL MANDIBULAR CHEEK
LOCATION DETAILED: RIGHT SUPERIOR UPPER BACK
LOCATION DETAILED: RIGHT INFERIOR HELIX
LOCATION DETAILED: LEFT SUPERIOR PARIETAL SCALP
LOCATION DETAILED: RIGHT DISTAL PRETIBIAL REGION
LOCATION DETAILED: LEFT PROXIMAL DORSAL FOREARM
LOCATION DETAILED: RIGHT SUPERIOR MEDIAL UPPER BACK
LOCATION DETAILED: RIGHT ANTERIOR EARLOBE
LOCATION DETAILED: NASAL SUPRATIP
LOCATION DETAILED: RIGHT PROXIMAL DORSAL FOREARM
LOCATION DETAILED: RIGHT CLAVICULAR NECK
LOCATION DETAILED: RIGHT INFERIOR FOREHEAD
LOCATION DETAILED: RIGHT CENTRAL TEMPLE
LOCATION DETAILED: RIGHT POPLITEAL SKIN
LOCATION DETAILED: PERIUMBILICAL SKIN
LOCATION DETAILED: SUBXIPHOID
LOCATION DETAILED: RIGHT INFERIOR UPPER BACK

## 2021-09-07 ASSESSMENT — LOCATION ZONE DERM
LOCATION ZONE: TRUNK
LOCATION ZONE: SCALP
LOCATION ZONE: NECK
LOCATION ZONE: NOSE
LOCATION ZONE: LEG
LOCATION ZONE: ARM
LOCATION ZONE: EAR
LOCATION ZONE: FACE

## 2021-09-07 NOTE — PROCEDURE: LIQUID NITROGEN
Render Note In Bullet Format When Appropriate: No
Consent: The patient's consent was obtained including but not limited to risks of crusting, scabbing, blistering, scarring, darker or lighter pigmentary change, recurrence, incomplete removal and infection.
Show Aperture Variable?: Yes
Post-Care Instructions: I reviewed with the patient in detail post-care instructions. Patient is to wear sunprotection, and avoid picking at any of the treated lesions. Pt may apply Vaseline to crusted or scabbing areas.
Duration Of Freeze Thaw-Cycle (Seconds): 0
Detail Level: Detailed

## 2022-01-01 ENCOUNTER — HOSPITAL ENCOUNTER (INPATIENT)
Age: 87
LOS: 1 days | DRG: 871 | End: 2022-12-31
Attending: INTERNAL MEDICINE | Admitting: INTERNAL MEDICINE
Payer: MEDICARE

## 2022-01-01 ENCOUNTER — APPOINTMENT (OUTPATIENT)
Dept: GENERAL RADIOLOGY | Age: 87
DRG: 871 | End: 2022-01-01
Attending: INTERNAL MEDICINE
Payer: MEDICARE

## 2022-01-01 VITALS
TEMPERATURE: 32 F | SYSTOLIC BLOOD PRESSURE: 93 MMHG | WEIGHT: 182 LBS | BODY MASS INDEX: 29.25 KG/M2 | HEIGHT: 66 IN | RESPIRATION RATE: 25 BRPM | HEART RATE: 120 BPM | DIASTOLIC BLOOD PRESSURE: 52 MMHG | OXYGEN SATURATION: 46 %

## 2022-01-01 LAB
ANION GAP SERPL CALC-SCNC: 6 MMOL/L (ref 2–11)
ARTERIAL PATENCY WRIST A: POSITIVE
BASE EXCESS BLD CALC-SCNC: 5.9 MMOL/L
BASOPHILS # BLD: 0 K/UL (ref 0–0.2)
BASOPHILS NFR BLD: 0 % (ref 0–2)
BDY SITE: ABNORMAL
BUN SERPL-MCNC: 37 MG/DL (ref 8–23)
CALCIUM SERPL-MCNC: 9.8 MG/DL (ref 8.3–10.4)
CHLORIDE SERPL-SCNC: 103 MMOL/L (ref 101–110)
CO2 BLD-SCNC: 34 MMOL/L (ref 13–23)
CO2 SERPL-SCNC: 32 MMOL/L (ref 21–32)
CREAT SERPL-MCNC: 0.7 MG/DL (ref 0.6–1)
CRP SERPL-MCNC: 15.7 MG/DL (ref 0–0.9)
DIFFERENTIAL METHOD BLD: ABNORMAL
EKG ATRIAL RATE: 141 BPM
EKG DIAGNOSIS: NORMAL
EKG Q-T INTERVAL: 360 MS
EKG QRS DURATION: 102 MS
EKG QTC CALCULATION (BAZETT): 523 MS
EKG R AXIS: 5 DEGREES
EKG T AXIS: 198 DEGREES
EKG VENTRICULAR RATE: 127 BPM
EOSINOPHIL # BLD: 0 K/UL (ref 0–0.8)
EOSINOPHIL NFR BLD: 0 % (ref 0.5–7.8)
ERYTHROCYTE [DISTWIDTH] IN BLOOD BY AUTOMATED COUNT: 18.7 % (ref 11.9–14.6)
GAS FLOW.O2 O2 DELIVERY SYS: ABNORMAL
GLUCOSE SERPL-MCNC: 139 MG/DL (ref 65–100)
HCO3 BLD-SCNC: 34 MMOL/L (ref 22–26)
HCT VFR BLD AUTO: 27.9 % (ref 35.8–46.3)
HGB BLD-MCNC: 8.7 G/DL (ref 11.7–15.4)
IMM GRANULOCYTES # BLD AUTO: 0.2 K/UL (ref 0–0.5)
IMM GRANULOCYTES NFR BLD AUTO: 2 % (ref 0–5)
INSPIRATION.DURATION SETTING TIME VENT: 0.7 SEC
IPAP/PIP/HIGH PEEP: 22
LDH SERPL L TO P-CCNC: 572 U/L (ref 110–210)
LYMPHOCYTES # BLD: 0.4 K/UL (ref 0.5–4.6)
LYMPHOCYTES NFR BLD: 4 % (ref 13–44)
MCH RBC QN AUTO: 28.9 PG (ref 26.1–32.9)
MCHC RBC AUTO-ENTMCNC: 31.2 G/DL (ref 31.4–35)
MCV RBC AUTO: 92.7 FL (ref 82–102)
MONOCYTES # BLD: 0.4 K/UL (ref 0.1–1.3)
MONOCYTES NFR BLD: 4 % (ref 4–12)
NEUTS SEG # BLD: 8.9 K/UL (ref 1.7–8.2)
NEUTS SEG NFR BLD: 90 % (ref 43–78)
NRBC # BLD: 0 K/UL (ref 0–0.2)
O2/TOTAL GAS SETTING VFR VENT: 80 %
PCO2 BLD: 67.5 MMHG (ref 35–45)
PEEP RESPIRATORY: 10 CMH2O
PH BLD: 7.31 (ref 7.35–7.45)
PLATELET # BLD AUTO: 158 K/UL (ref 150–450)
PMV BLD AUTO: 10.2 FL (ref 9.4–12.3)
PO2 BLD: 98 MMHG (ref 75–100)
POTASSIUM SERPL-SCNC: 3.7 MMOL/L (ref 3.5–5.1)
PRESSURE SUPPORT SETTING VENT: 2 CMH2O
RBC # BLD AUTO: 3.01 M/UL (ref 4.05–5.2)
RESPIRATORY RATE, POC: 35 (ref 5–40)
SAO2 % BLD: 96.6 % (ref 95–98)
SERVICE CMNT-IMP: ABNORMAL
SODIUM SERPL-SCNC: 141 MMOL/L (ref 133–143)
SPECIMEN TYPE: ABNORMAL
VENTILATION MODE VENT: ABNORMAL
WBC # BLD AUTO: 9.8 K/UL (ref 4.3–11.1)

## 2022-01-01 PROCEDURE — 6370000000 HC RX 637 (ALT 250 FOR IP): Performed by: NURSE PRACTITIONER

## 2022-01-01 PROCEDURE — 94640 AIRWAY INHALATION TREATMENT: CPT

## 2022-01-01 PROCEDURE — 2580000003 HC RX 258: Performed by: INTERNAL MEDICINE

## 2022-01-01 PROCEDURE — 6370000000 HC RX 637 (ALT 250 FOR IP): Performed by: INTERNAL MEDICINE

## 2022-01-01 PROCEDURE — 87186 SC STD MICRODIL/AGAR DIL: CPT

## 2022-01-01 PROCEDURE — 99291 CRITICAL CARE FIRST HOUR: CPT | Performed by: INTERNAL MEDICINE

## 2022-01-01 PROCEDURE — 6360000002 HC RX W HCPCS: Performed by: INTERNAL MEDICINE

## 2022-01-01 PROCEDURE — 2500000003 HC RX 250 WO HCPCS

## 2022-01-01 PROCEDURE — 82803 BLOOD GASES ANY COMBINATION: CPT

## 2022-01-01 PROCEDURE — 2500000003 HC RX 250 WO HCPCS: Performed by: NURSE PRACTITIONER

## 2022-01-01 PROCEDURE — 36415 COLL VENOUS BLD VENIPUNCTURE: CPT

## 2022-01-01 PROCEDURE — 94660 CPAP INITIATION&MGMT: CPT

## 2022-01-01 PROCEDURE — 2580000003 HC RX 258: Performed by: NURSE PRACTITIONER

## 2022-01-01 PROCEDURE — 2500000003 HC RX 250 WO HCPCS: Performed by: INTERNAL MEDICINE

## 2022-01-01 PROCEDURE — A4216 STERILE WATER/SALINE, 10 ML: HCPCS | Performed by: NURSE PRACTITIONER

## 2022-01-01 PROCEDURE — 87106 FUNGI IDENTIFICATION YEAST: CPT

## 2022-01-01 PROCEDURE — 6360000002 HC RX W HCPCS: Performed by: NURSE PRACTITIONER

## 2022-01-01 PROCEDURE — 99223 1ST HOSP IP/OBS HIGH 75: CPT | Performed by: INTERNAL MEDICINE

## 2022-01-01 PROCEDURE — 36600 WITHDRAWAL OF ARTERIAL BLOOD: CPT

## 2022-01-01 PROCEDURE — 5A09357 ASSISTANCE WITH RESPIRATORY VENTILATION, LESS THAN 24 CONSECUTIVE HOURS, CONTINUOUS POSITIVE AIRWAY PRESSURE: ICD-10-PCS | Performed by: INTERNAL MEDICINE

## 2022-01-01 PROCEDURE — 2000000000 HC ICU R&B

## 2022-01-01 PROCEDURE — 6360000002 HC RX W HCPCS

## 2022-01-01 PROCEDURE — 87040 BLOOD CULTURE FOR BACTERIA: CPT

## 2022-01-01 PROCEDURE — 2580000003 HC RX 258

## 2022-01-01 PROCEDURE — 87086 URINE CULTURE/COLONY COUNT: CPT

## 2022-01-01 PROCEDURE — 87449 NOS EACH ORGANISM AG IA: CPT

## 2022-01-01 PROCEDURE — 71045 X-RAY EXAM CHEST 1 VIEW: CPT

## 2022-01-01 PROCEDURE — 87088 URINE BACTERIA CULTURE: CPT

## 2022-01-01 PROCEDURE — 83615 LACTATE (LD) (LDH) ENZYME: CPT

## 2022-01-01 PROCEDURE — 80048 BASIC METABOLIC PNL TOTAL CA: CPT

## 2022-01-01 PROCEDURE — 85025 COMPLETE CBC W/AUTO DIFF WBC: CPT

## 2022-01-01 PROCEDURE — 86140 C-REACTIVE PROTEIN: CPT

## 2022-01-01 PROCEDURE — 93005 ELECTROCARDIOGRAM TRACING: CPT

## 2022-01-01 RX ORDER — DILTIAZEM HYDROCHLORIDE 5 MG/ML
10 INJECTION INTRAVENOUS ONCE
Status: COMPLETED | OUTPATIENT
Start: 2022-01-01 | End: 2022-01-01

## 2022-01-01 RX ORDER — ATOVAQUONE 750 MG/5ML
1500 SUSPENSION ORAL DAILY
Status: DISCONTINUED | OUTPATIENT
Start: 2022-01-01 | End: 2023-01-01 | Stop reason: HOSPADM

## 2022-01-01 RX ORDER — LORAZEPAM 2 MG/ML
2 INJECTION INTRAMUSCULAR
Status: DISCONTINUED | OUTPATIENT
Start: 2022-01-01 | End: 2023-01-01 | Stop reason: HOSPADM

## 2022-01-01 RX ORDER — METOPROLOL TARTRATE 5 MG/5ML
5 INJECTION INTRAVENOUS ONCE
Status: DISCONTINUED | OUTPATIENT
Start: 2022-01-01 | End: 2022-01-01

## 2022-01-01 RX ORDER — LEVOTHYROXINE SODIUM 0.05 MG/1
50 TABLET ORAL DAILY
Status: DISCONTINUED | OUTPATIENT
Start: 2022-01-01 | End: 2023-01-01 | Stop reason: HOSPADM

## 2022-01-01 RX ORDER — MORPHINE SULFATE 2 MG/ML
2 INJECTION, SOLUTION INTRAMUSCULAR; INTRAVENOUS ONCE
Status: COMPLETED | OUTPATIENT
Start: 2022-01-01 | End: 2022-01-01

## 2022-01-01 RX ORDER — ACETAMINOPHEN 650 MG/1
650 SUPPOSITORY RECTAL EVERY 4 HOURS PRN
Status: DISCONTINUED | OUTPATIENT
Start: 2022-01-01 | End: 2023-01-01 | Stop reason: HOSPADM

## 2022-01-01 RX ORDER — DEXMEDETOMIDINE HYDROCHLORIDE 4 UG/ML
.1-1.5 INJECTION, SOLUTION INTRAVENOUS CONTINUOUS
Status: DISCONTINUED | OUTPATIENT
Start: 2022-01-01 | End: 2023-01-01 | Stop reason: HOSPADM

## 2022-01-01 RX ORDER — HYDRALAZINE HYDROCHLORIDE 20 MG/ML
20 INJECTION INTRAMUSCULAR; INTRAVENOUS EVERY 4 HOURS PRN
Status: DISCONTINUED | OUTPATIENT
Start: 2022-01-01 | End: 2023-01-01 | Stop reason: HOSPADM

## 2022-01-01 RX ORDER — METHYLPREDNISOLONE SODIUM SUCCINATE 125 MG/2ML
125 INJECTION, POWDER, LYOPHILIZED, FOR SOLUTION INTRAMUSCULAR; INTRAVENOUS EVERY 6 HOURS
Status: DISCONTINUED | OUTPATIENT
Start: 2022-01-01 | End: 2022-01-01

## 2022-01-01 RX ORDER — HYDROCODONE BITARTRATE AND ACETAMINOPHEN 5; 325 MG/1; MG/1
1 TABLET ORAL EVERY 6 HOURS
Status: DISCONTINUED | OUTPATIENT
Start: 2022-01-01 | End: 2023-01-01 | Stop reason: HOSPADM

## 2022-01-01 RX ORDER — LORAZEPAM 2 MG/ML
1 INJECTION INTRAMUSCULAR ONCE
Status: COMPLETED | OUTPATIENT
Start: 2022-01-01 | End: 2022-01-01

## 2022-01-01 RX ORDER — SENNA AND DOCUSATE SODIUM 50; 8.6 MG/1; MG/1
2 TABLET, FILM COATED ORAL DAILY PRN
Status: DISCONTINUED | OUTPATIENT
Start: 2022-01-01 | End: 2023-01-01 | Stop reason: HOSPADM

## 2022-01-01 RX ORDER — AMITRIPTYLINE HYDROCHLORIDE 25 MG/1
12.5 TABLET, FILM COATED ORAL NIGHTLY
Status: DISCONTINUED | OUTPATIENT
Start: 2022-01-01 | End: 2023-01-01 | Stop reason: HOSPADM

## 2022-01-01 RX ORDER — BUDESONIDE 0.5 MG/2ML
0.5 INHALANT ORAL 2 TIMES DAILY
Status: DISCONTINUED | OUTPATIENT
Start: 2022-01-01 | End: 2023-01-01 | Stop reason: HOSPADM

## 2022-01-01 RX ORDER — DULOXETIN HYDROCHLORIDE 30 MG/1
30 CAPSULE, DELAYED RELEASE ORAL DAILY
Status: DISCONTINUED | OUTPATIENT
Start: 2022-01-01 | End: 2023-01-01 | Stop reason: HOSPADM

## 2022-01-01 RX ORDER — FENTANYL 12 UG/H
1 PATCH TRANSDERMAL
Status: DISCONTINUED | OUTPATIENT
Start: 2022-01-01 | End: 2023-01-01 | Stop reason: HOSPADM

## 2022-01-01 RX ORDER — SCOLOPAMINE TRANSDERMAL SYSTEM 1 MG/1
1 PATCH, EXTENDED RELEASE TRANSDERMAL
Status: DISCONTINUED | OUTPATIENT
Start: 2022-01-01 | End: 2023-01-01 | Stop reason: HOSPADM

## 2022-01-01 RX ORDER — GLYCOPYRROLATE 0.2 MG/ML
0.1 INJECTION INTRAMUSCULAR; INTRAVENOUS EVERY 6 HOURS PRN
Status: DISCONTINUED | OUTPATIENT
Start: 2022-01-01 | End: 2023-01-01 | Stop reason: HOSPADM

## 2022-01-01 RX ORDER — ENOXAPARIN SODIUM 100 MG/ML
80 INJECTION SUBCUTANEOUS EVERY 12 HOURS
Status: DISCONTINUED | OUTPATIENT
Start: 2022-01-01 | End: 2023-01-01 | Stop reason: HOSPADM

## 2022-01-01 RX ORDER — FUROSEMIDE 10 MG/ML
40 INJECTION INTRAMUSCULAR; INTRAVENOUS DAILY
Status: DISCONTINUED | OUTPATIENT
Start: 2022-01-01 | End: 2023-01-01 | Stop reason: HOSPADM

## 2022-01-01 RX ORDER — FLECAINIDE ACETATE 50 MG/1
75 TABLET ORAL 2 TIMES DAILY
Status: DISCONTINUED | OUTPATIENT
Start: 2022-01-01 | End: 2023-01-01 | Stop reason: HOSPADM

## 2022-01-01 RX ORDER — HYDROMORPHONE HYDROCHLORIDE 1 MG/ML
1 INJECTION, SOLUTION INTRAMUSCULAR; INTRAVENOUS; SUBCUTANEOUS
Status: DISCONTINUED | OUTPATIENT
Start: 2022-01-01 | End: 2023-01-01 | Stop reason: HOSPADM

## 2022-01-01 RX ORDER — IPRATROPIUM BROMIDE AND ALBUTEROL SULFATE 2.5; .5 MG/3ML; MG/3ML
1 SOLUTION RESPIRATORY (INHALATION)
Status: DISCONTINUED | OUTPATIENT
Start: 2022-01-01 | End: 2023-01-01 | Stop reason: HOSPADM

## 2022-01-01 RX ORDER — GLIMEPIRIDE 2 MG/1
1 TABLET ORAL 2 TIMES DAILY
Status: DISCONTINUED | OUTPATIENT
Start: 2022-01-01 | End: 2023-01-01 | Stop reason: HOSPADM

## 2022-01-01 RX ORDER — METHYLPREDNISOLONE SODIUM SUCCINATE 125 MG/2ML
250 INJECTION, POWDER, LYOPHILIZED, FOR SOLUTION INTRAMUSCULAR; INTRAVENOUS EVERY 6 HOURS
Status: DISCONTINUED | OUTPATIENT
Start: 2022-01-01 | End: 2023-01-01 | Stop reason: HOSPADM

## 2022-01-01 RX ORDER — GABAPENTIN 100 MG/1
100 CAPSULE ORAL 3 TIMES DAILY
Status: DISCONTINUED | OUTPATIENT
Start: 2022-01-01 | End: 2023-01-01 | Stop reason: HOSPADM

## 2022-01-01 RX ORDER — DIPHENHYDRAMINE HYDROCHLORIDE 50 MG/ML
12.5 INJECTION INTRAMUSCULAR; INTRAVENOUS ONCE
Status: COMPLETED | OUTPATIENT
Start: 2022-01-01 | End: 2022-01-01

## 2022-01-01 RX ADMIN — BUDESONIDE 500 MCG: 0.5 INHALANT RESPIRATORY (INHALATION) at 08:02

## 2022-01-01 RX ADMIN — HYDROCODONE BITARTRATE AND ACETAMINOPHEN 1 TABLET: 5; 325 TABLET ORAL at 17:55

## 2022-01-01 RX ADMIN — DEXMEDETOMIDINE 1.5 MCG/KG/HR: 100 INJECTION, SOLUTION INTRAVENOUS at 13:20

## 2022-01-01 RX ADMIN — SODIUM CHLORIDE 15 MG/HR: 900 INJECTION, SOLUTION INTRAVENOUS at 16:10

## 2022-01-01 RX ADMIN — DEXMEDETOMIDINE 0.2 MCG/KG/HR: 100 INJECTION, SOLUTION INTRAVENOUS at 17:10

## 2022-01-01 RX ADMIN — METHYLPREDNISOLONE SODIUM SUCCINATE 125 MG: 125 INJECTION, POWDER, FOR SOLUTION INTRAMUSCULAR; INTRAVENOUS at 05:09

## 2022-01-01 RX ADMIN — HYDROMORPHONE HYDROCHLORIDE 1 MG: 1 INJECTION, SOLUTION INTRAMUSCULAR; INTRAVENOUS; SUBCUTANEOUS at 17:36

## 2022-01-01 RX ADMIN — GABAPENTIN 100 MG: 100 CAPSULE ORAL at 21:03

## 2022-01-01 RX ADMIN — TIMOLOL MALEATE 1 DROP: 2.5 SOLUTION OPHTHALMIC at 21:03

## 2022-01-01 RX ADMIN — LORAZEPAM 2 MG: 2 INJECTION INTRAMUSCULAR; INTRAVENOUS at 17:15

## 2022-01-01 RX ADMIN — BUDESONIDE 500 MCG: 0.5 INHALANT RESPIRATORY (INHALATION) at 19:06

## 2022-01-01 RX ADMIN — TIMOLOL MALEATE 1 DROP: 2.5 SOLUTION OPHTHALMIC at 08:12

## 2022-01-01 RX ADMIN — FAMOTIDINE 20 MG: 10 INJECTION INTRAVENOUS at 17:28

## 2022-01-01 RX ADMIN — HYDROMORPHONE HYDROCHLORIDE 1 MG: 1 INJECTION, SOLUTION INTRAMUSCULAR; INTRAVENOUS; SUBCUTANEOUS at 17:15

## 2022-01-01 RX ADMIN — IPRATROPIUM BROMIDE AND ALBUTEROL SULFATE 1 AMPULE: .5; 3 SOLUTION RESPIRATORY (INHALATION) at 11:27

## 2022-01-01 RX ADMIN — DIPHENHYDRAMINE HYDROCHLORIDE 12.5 MG: 50 INJECTION, SOLUTION INTRAMUSCULAR; INTRAVENOUS at 23:45

## 2022-01-01 RX ADMIN — SODIUM CHLORIDE 15 MG/HR: 900 INJECTION, SOLUTION INTRAVENOUS at 07:01

## 2022-01-01 RX ADMIN — ENOXAPARIN SODIUM 80 MG: 100 INJECTION SUBCUTANEOUS at 05:08

## 2022-01-01 RX ADMIN — CEFEPIME 2000 MG: 2 INJECTION, POWDER, FOR SOLUTION INTRAVENOUS at 09:14

## 2022-01-01 RX ADMIN — ACETAMINOPHEN 650 MG: 650 SUPPOSITORY RECTAL at 08:07

## 2022-01-01 RX ADMIN — IPRATROPIUM BROMIDE AND ALBUTEROL SULFATE 1 AMPULE: .5; 3 SOLUTION RESPIRATORY (INHALATION) at 19:06

## 2022-01-01 RX ADMIN — FLECAINIDE ACETATE 75 MG: 50 TABLET ORAL at 21:03

## 2022-01-01 RX ADMIN — DEXMEDETOMIDINE 1 MCG/KG/HR: 100 INJECTION, SOLUTION INTRAVENOUS at 22:05

## 2022-01-01 RX ADMIN — MORPHINE SULFATE 2 MG: 2 INJECTION, SOLUTION INTRAMUSCULAR; INTRAVENOUS at 23:45

## 2022-01-01 RX ADMIN — LORAZEPAM 2 MG: 2 INJECTION INTRAMUSCULAR; INTRAVENOUS at 17:37

## 2022-01-01 RX ADMIN — LORAZEPAM 1 MG: 2 INJECTION INTRAMUSCULAR; INTRAVENOUS at 05:09

## 2022-01-01 RX ADMIN — DILTIAZEM HYDROCHLORIDE 10 MG: 5 INJECTION INTRAVENOUS at 00:32

## 2022-01-01 RX ADMIN — CEFEPIME 2000 MG: 2 INJECTION, POWDER, FOR SOLUTION INTRAVENOUS at 16:11

## 2022-01-01 RX ADMIN — SODIUM CHLORIDE 5 MG/HR: 900 INJECTION, SOLUTION INTRAVENOUS at 00:36

## 2022-01-01 RX ADMIN — WATER 5 MG: 1 INJECTION INTRAMUSCULAR; INTRAVENOUS; SUBCUTANEOUS at 08:22

## 2022-01-01 RX ADMIN — METHYLPREDNISOLONE SODIUM SUCCINATE 250 MG: 125 INJECTION, POWDER, FOR SOLUTION INTRAMUSCULAR; INTRAVENOUS at 12:43

## 2022-01-01 RX ADMIN — IPRATROPIUM BROMIDE AND ALBUTEROL SULFATE 1 AMPULE: .5; 3 SOLUTION RESPIRATORY (INHALATION) at 15:30

## 2022-01-01 RX ADMIN — FUROSEMIDE 40 MG: 10 INJECTION, SOLUTION INTRAMUSCULAR; INTRAVENOUS at 08:06

## 2022-01-01 RX ADMIN — METOPROLOL TARTRATE 25 MG: 25 TABLET, FILM COATED ORAL at 17:29

## 2022-01-01 RX ADMIN — FAMOTIDINE 20 MG: 10 INJECTION INTRAVENOUS at 05:09

## 2022-01-01 RX ADMIN — DEXMEDETOMIDINE 0.6 MCG/KG/HR: 100 INJECTION, SOLUTION INTRAVENOUS at 04:19

## 2022-01-01 RX ADMIN — FUROSEMIDE 40 MG: 10 INJECTION, SOLUTION INTRAMUSCULAR; INTRAVENOUS at 17:28

## 2022-01-01 RX ADMIN — VANCOMYCIN HYDROCHLORIDE 1750 MG: 100 INJECTION, POWDER, LYOPHILIZED, FOR SOLUTION INTRAVENOUS at 09:49

## 2022-01-01 RX ADMIN — METHYLPREDNISOLONE SODIUM SUCCINATE 125 MG: 125 INJECTION, POWDER, FOR SOLUTION INTRAMUSCULAR; INTRAVENOUS at 17:15

## 2022-01-01 RX ADMIN — LORAZEPAM 1 MG: 2 INJECTION INTRAMUSCULAR; INTRAVENOUS at 02:16

## 2022-01-01 RX ADMIN — METHYLPREDNISOLONE SODIUM SUCCINATE 125 MG: 125 INJECTION, POWDER, FOR SOLUTION INTRAMUSCULAR; INTRAVENOUS at 23:28

## 2022-01-01 RX ADMIN — DEXMEDETOMIDINE 1.5 MCG/KG/HR: 100 INJECTION, SOLUTION INTRAVENOUS at 17:27

## 2022-01-01 RX ADMIN — IPRATROPIUM BROMIDE AND ALBUTEROL SULFATE 1 AMPULE: .5; 3 SOLUTION RESPIRATORY (INHALATION) at 08:02

## 2022-01-01 RX ADMIN — AMITRIPTYLINE HYDROCHLORIDE 12.5 MG: 25 TABLET, FILM COATED ORAL at 21:04

## 2022-01-01 RX ADMIN — ENOXAPARIN SODIUM 80 MG: 100 INJECTION SUBCUTANEOUS at 18:18

## 2022-01-01 RX ADMIN — DEXMEDETOMIDINE 1.5 MCG/KG/HR: 100 INJECTION, SOLUTION INTRAVENOUS at 09:34

## 2022-01-01 ASSESSMENT — PAIN SCALES - GENERAL
PAINLEVEL_OUTOF10: 0
PAINLEVEL_OUTOF10: 4
PAINLEVEL_OUTOF10: 0
PAINLEVEL_OUTOF10: 0

## 2022-03-18 PROBLEM — Z79.01 ANTICOAGULANT LONG-TERM USE: Status: ACTIVE | Noted: 2017-08-09
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
## 2022-03-19 PROBLEM — R55 SYNCOPE: Status: ACTIVE | Noted: 2019-11-20

## 2022-03-20 PROBLEM — Z79.01 LONG TERM (CURRENT) USE OF ANTICOAGULANTS: Status: ACTIVE | Noted: 2018-05-01

## 2022-03-29 ENCOUNTER — APPOINTMENT (RX ONLY)
Dept: URBAN - METROPOLITAN AREA CLINIC 24 | Facility: CLINIC | Age: 87
Setting detail: DERMATOLOGY
End: 2022-03-29

## 2022-03-29 DIAGNOSIS — Z87.2 PERSONAL HISTORY OF DISEASES OF THE SKIN AND SUBCUTANEOUS TISSUE: ICD-10-CM

## 2022-03-29 DIAGNOSIS — D485 NEOPLASM OF UNCERTAIN BEHAVIOR OF SKIN: ICD-10-CM

## 2022-03-29 DIAGNOSIS — Z85.828 PERSONAL HISTORY OF OTHER MALIGNANT NEOPLASM OF SKIN: ICD-10-CM

## 2022-03-29 DIAGNOSIS — D18.0 HEMANGIOMA: ICD-10-CM

## 2022-03-29 DIAGNOSIS — L57.0 ACTINIC KERATOSIS: ICD-10-CM

## 2022-03-29 DIAGNOSIS — L57.8 OTHER SKIN CHANGES DUE TO CHRONIC EXPOSURE TO NONIONIZING RADIATION: ICD-10-CM

## 2022-03-29 DIAGNOSIS — L82.1 OTHER SEBORRHEIC KERATOSIS: ICD-10-CM

## 2022-03-29 DIAGNOSIS — L81.4 OTHER MELANIN HYPERPIGMENTATION: ICD-10-CM

## 2022-03-29 PROBLEM — M12.9 ARTHROPATHY, UNSPECIFIED: Status: ACTIVE | Noted: 2022-03-29

## 2022-03-29 PROBLEM — K21.9 GASTRO-ESOPHAGEAL REFLUX DISEASE WITHOUT ESOPHAGITIS: Status: ACTIVE | Noted: 2022-03-29

## 2022-03-29 PROBLEM — D18.01 HEMANGIOMA OF SKIN AND SUBCUTANEOUS TISSUE: Status: ACTIVE | Noted: 2022-03-29

## 2022-03-29 PROBLEM — F32.9 MAJOR DEPRESSIVE DISORDER, SINGLE EPISODE, UNSPECIFIED: Status: ACTIVE | Noted: 2022-03-29

## 2022-03-29 PROBLEM — L40.0 PSORIASIS VULGARIS: Status: ACTIVE | Noted: 2022-03-29

## 2022-03-29 PROBLEM — E03.9 HYPOTHYROIDISM, UNSPECIFIED: Status: ACTIVE | Noted: 2022-03-29

## 2022-03-29 PROBLEM — I10 ESSENTIAL (PRIMARY) HYPERTENSION: Status: ACTIVE | Noted: 2022-03-29

## 2022-03-29 PROBLEM — D48.5 NEOPLASM OF UNCERTAIN BEHAVIOR OF SKIN: Status: ACTIVE | Noted: 2022-03-29

## 2022-03-29 PROCEDURE — ? LIQUID NITROGEN

## 2022-03-29 PROCEDURE — 17003 DESTRUCT PREMALG LES 2-14: CPT

## 2022-03-29 PROCEDURE — ? BIOPSY BY SHAVE METHOD

## 2022-03-29 PROCEDURE — 99213 OFFICE O/P EST LOW 20 MIN: CPT | Mod: 25

## 2022-03-29 PROCEDURE — 17000 DESTRUCT PREMALG LESION: CPT | Mod: 59

## 2022-03-29 PROCEDURE — ? COUNSELING

## 2022-03-29 PROCEDURE — 11102 TANGNTL BX SKIN SINGLE LES: CPT

## 2022-03-29 ASSESSMENT — LOCATION ZONE DERM
LOCATION ZONE: FACE
LOCATION ZONE: EAR
LOCATION ZONE: NECK
LOCATION ZONE: SCALP
LOCATION ZONE: TRUNK
LOCATION ZONE: ARM

## 2022-03-29 ASSESSMENT — LOCATION SIMPLE DESCRIPTION DERM
LOCATION SIMPLE: RIGHT FOREARM
LOCATION SIMPLE: RIGHT EAR
LOCATION SIMPLE: LEFT CHEEK
LOCATION SIMPLE: LEFT FOREARM
LOCATION SIMPLE: RIGHT ANTERIOR NECK
LOCATION SIMPLE: SCALP
LOCATION SIMPLE: UPPER BACK
LOCATION SIMPLE: RIGHT UPPER BACK
LOCATION SIMPLE: ABDOMEN
LOCATION SIMPLE: RIGHT ZYGOMA

## 2022-03-29 ASSESSMENT — LOCATION DETAILED DESCRIPTION DERM
LOCATION DETAILED: LEFT SUPERIOR PREAURICULAR CHEEK
LOCATION DETAILED: RIGHT SUPERIOR UPPER BACK
LOCATION DETAILED: SUBXIPHOID
LOCATION DETAILED: RIGHT INFERIOR HELIX
LOCATION DETAILED: RIGHT CLAVICULAR NECK
LOCATION DETAILED: RIGHT SUPERIOR MEDIAL UPPER BACK
LOCATION DETAILED: PERIUMBILICAL SKIN
LOCATION DETAILED: RIGHT CENTRAL ZYGOMA
LOCATION DETAILED: RIGHT INFERIOR UPPER BACK
LOCATION DETAILED: RIGHT PROXIMAL DORSAL FOREARM
LOCATION DETAILED: LEFT PROXIMAL DORSAL FOREARM
LOCATION DETAILED: LEFT INFERIOR POSTAURICULAR SKIN
LOCATION DETAILED: INFERIOR THORACIC SPINE
LOCATION DETAILED: LEFT SUPERIOR CENTRAL MALAR CHEEK

## 2022-03-29 NOTE — PROCEDURE: BIOPSY BY SHAVE METHOD
Hide Biopsy Depth?: No
Detail Level: Detailed
Additional Anesthesia Volume In Cc (Will Not Render If 0): 0
Electrodesiccation Text: The wound bed was treated with electrodesiccation after the biopsy was performed.
Hemostasis: Aluminum Chloride
Validate Note Data (See Information Below): Yes
Billing Type: Third-Party Bill
Depth Of Biopsy: dermis
Accession #: [id number]
Biopsy Method: Dermablade
Dressing: bandage
Consent: Written consent was obtained and risks were reviewed including but not limited to scarring, infection, bleeding, scabbing, incomplete removal, and allergy to anesthesia.
Post-care instructions were reviewed in detail and written instructions are provided. Patient is to keep the biopsy site dry overnight, and then apply bacitracin twice daily until healed. Patient may apply hydrogen peroxide soaks to remove any crusting.
Electrodesiccation And Curettage Text: The wound bed was treated with electrodesiccation and curettage after the biopsy was performed.
Type Of Destruction Used: Curettage
Cryotherapy Text: The wound bed was treated with cryotherapy after the biopsy was performed.
Biopsy Type: H and E
Wound Care: Vaseline
Anesthesia Type: 1% lidocaine with epinephrine
Anesthesia Volume In Cc: 0.3
Information: Selecting Yes will display possible errors in your note based on the variables you have selected. This validation is only offered as a suggestion for you. PLEASE NOTE THAT THE VALIDATION TEXT WILL BE REMOVED WHEN YOU FINALIZE YOUR NOTE. IF YOU WANT TO FAX A PRELIMINARY NOTE YOU WILL NEED TO TOGGLE THIS TO 'NO' IF YOU DO NOT WANT IT IN YOUR FAXED NOTE.
Silver Nitrate Text: The wound bed was treated with silver nitrate after the biopsy was performed.
Notification Instructions: Patient will be notified of biopsy results. However, patient instructed to call the office if not contacted within 2 weeks.

## 2022-06-15 ENCOUNTER — APPOINTMENT (RX ONLY)
Dept: URBAN - METROPOLITAN AREA CLINIC 23 | Facility: CLINIC | Age: 87
Setting detail: DERMATOLOGY
End: 2022-06-15

## 2022-06-15 DIAGNOSIS — Z41.9 ENCOUNTER FOR PROCEDURE FOR PURPOSES OTHER THAN REMEDYING HEALTH STATE, UNSPECIFIED: ICD-10-CM

## 2022-06-15 PROCEDURE — ? COSMETIC CONSULTATION: PRODUCTS

## 2022-06-15 ASSESSMENT — LOCATION DETAILED DESCRIPTION DERM: LOCATION DETAILED: RIGHT INFERIOR CENTRAL MALAR CHEEK

## 2022-06-15 ASSESSMENT — LOCATION ZONE DERM: LOCATION ZONE: FACE

## 2022-06-15 ASSESSMENT — LOCATION SIMPLE DESCRIPTION DERM: LOCATION SIMPLE: RIGHT CHEEK

## 2022-06-15 NOTE — HPI: COSMETIC CONSULTATION
Additional History: romeo has some sun on face but wnated to treat dark spots\\n$175 for spots on face\\n$350 for spots onf ace, neck, chest hands\\nwill start with face first, has cataract surgery in sep an dmay do after\\ngave samples of elta foamin cleanser, elta daily and clear tinted and non and Biogel\\nher nose is dryer and flakey and cant heal\\ngave biogel but she will have to pay if she wants more healing ointment\\ndoesnt use any products on face at all\\nstressed sunscreen

## 2022-07-01 ENCOUNTER — TELEPHONE (OUTPATIENT)
Dept: CARDIOLOGY CLINIC | Age: 87
End: 2022-07-01

## 2022-07-01 NOTE — TELEPHONE ENCOUNTER
Per Vicente Right:  Household of 2 income is 68,000 and 54,000 is what they approve so patient was denied. Spoke with patient. Relayed message above. Patient voiced understanding.

## 2022-07-27 RX ORDER — APIXABAN 5 MG/1
TABLET, FILM COATED ORAL
Qty: 180 TABLET | Refills: 3 | Status: SHIPPED | OUTPATIENT
Start: 2022-07-27 | End: 2022-08-16

## 2022-08-16 ENCOUNTER — OFFICE VISIT (OUTPATIENT)
Dept: CARDIOLOGY CLINIC | Age: 87
End: 2022-08-16
Payer: MEDICARE

## 2022-08-16 VITALS
SYSTOLIC BLOOD PRESSURE: 128 MMHG | HEIGHT: 67 IN | HEART RATE: 52 BPM | DIASTOLIC BLOOD PRESSURE: 74 MMHG | WEIGHT: 196 LBS | BODY MASS INDEX: 30.76 KG/M2

## 2022-08-16 DIAGNOSIS — R00.1 BRADYCARDIA, UNSPECIFIED: Primary | ICD-10-CM

## 2022-08-16 DIAGNOSIS — Z79.01 LONG TERM (CURRENT) USE OF ANTICOAGULANTS: ICD-10-CM

## 2022-08-16 DIAGNOSIS — I48.0 PAROXYSMAL ATRIAL FIBRILLATION (HCC): ICD-10-CM

## 2022-08-16 DIAGNOSIS — I10 ESSENTIAL (PRIMARY) HYPERTENSION: ICD-10-CM

## 2022-08-16 DIAGNOSIS — R00.1 BRADYCARDIA, UNSPECIFIED: ICD-10-CM

## 2022-08-16 LAB
ANION GAP SERPL CALC-SCNC: 5 MMOL/L (ref 7–16)
BASOPHILS # BLD: 0 K/UL (ref 0–0.2)
BASOPHILS NFR BLD: 1 % (ref 0–2)
BUN SERPL-MCNC: 23 MG/DL (ref 8–23)
CALCIUM SERPL-MCNC: 9.9 MG/DL (ref 8.3–10.4)
CHLORIDE SERPL-SCNC: 100 MMOL/L (ref 98–107)
CO2 SERPL-SCNC: 30 MMOL/L (ref 21–32)
CREAT SERPL-MCNC: 1 MG/DL (ref 0.6–1)
DIFFERENTIAL METHOD BLD: ABNORMAL
EOSINOPHIL # BLD: 0.2 K/UL (ref 0–0.8)
EOSINOPHIL NFR BLD: 3 % (ref 0.5–7.8)
ERYTHROCYTE [DISTWIDTH] IN BLOOD BY AUTOMATED COUNT: 15.1 % (ref 11.9–14.6)
GLUCOSE SERPL-MCNC: 87 MG/DL (ref 65–100)
HCT VFR BLD AUTO: 33.9 % (ref 35.8–46.3)
HGB BLD-MCNC: 10.9 G/DL (ref 11.7–15.4)
IMM GRANULOCYTES # BLD AUTO: 0 K/UL (ref 0–0.5)
IMM GRANULOCYTES NFR BLD AUTO: 0 % (ref 0–5)
LYMPHOCYTES # BLD: 1.3 K/UL (ref 0.5–4.6)
LYMPHOCYTES NFR BLD: 23 % (ref 13–44)
MCH RBC QN AUTO: 29.9 PG (ref 26.1–32.9)
MCHC RBC AUTO-ENTMCNC: 32.2 G/DL (ref 31.4–35)
MCV RBC AUTO: 92.9 FL (ref 79.6–97.8)
MONOCYTES # BLD: 0.4 K/UL (ref 0.1–1.3)
MONOCYTES NFR BLD: 7 % (ref 4–12)
NEUTS SEG # BLD: 3.7 K/UL (ref 1.7–8.2)
NEUTS SEG NFR BLD: 66 % (ref 43–78)
NRBC # BLD: 0 K/UL (ref 0–0.2)
PLATELET # BLD AUTO: 222 K/UL (ref 150–450)
PMV BLD AUTO: 10.2 FL (ref 9.4–12.3)
POTASSIUM SERPL-SCNC: 3.8 MMOL/L (ref 3.5–5.1)
RBC # BLD AUTO: 3.65 M/UL (ref 4.05–5.2)
SODIUM SERPL-SCNC: 135 MMOL/L (ref 136–145)
WBC # BLD AUTO: 5.6 K/UL (ref 4.3–11.1)

## 2022-08-16 PROCEDURE — 1123F ACP DISCUSS/DSCN MKR DOCD: CPT | Performed by: INTERNAL MEDICINE

## 2022-08-16 PROCEDURE — G2066 INTER DEVC REMOTE 30D: HCPCS | Performed by: INTERNAL MEDICINE

## 2022-08-16 PROCEDURE — 1036F TOBACCO NON-USER: CPT | Performed by: INTERNAL MEDICINE

## 2022-08-16 PROCEDURE — G8417 CALC BMI ABV UP PARAM F/U: HCPCS | Performed by: INTERNAL MEDICINE

## 2022-08-16 PROCEDURE — 93298 REM INTERROG DEV EVAL SCRMS: CPT | Performed by: INTERNAL MEDICINE

## 2022-08-16 PROCEDURE — G8400 PT W/DXA NO RESULTS DOC: HCPCS | Performed by: INTERNAL MEDICINE

## 2022-08-16 PROCEDURE — 1090F PRES/ABSN URINE INCON ASSESS: CPT | Performed by: INTERNAL MEDICINE

## 2022-08-16 PROCEDURE — G8428 CUR MEDS NOT DOCUMENT: HCPCS | Performed by: INTERNAL MEDICINE

## 2022-08-16 PROCEDURE — 99214 OFFICE O/P EST MOD 30 MIN: CPT | Performed by: INTERNAL MEDICINE

## 2022-08-16 RX ORDER — AMLODIPINE BESYLATE 2.5 MG/1
2.5 TABLET ORAL DAILY
COMMUNITY
Start: 2022-07-19

## 2022-08-16 ASSESSMENT — ENCOUNTER SYMPTOMS
COUGH: 0
NAIL CHANGES: 0
ABDOMINAL PAIN: 0
APHONIA: 0
EYE PAIN: 0
STRIDOR: 0

## 2022-08-16 NOTE — PROGRESS NOTES
800 [facility], [address] [place] [address], [address] [place] [address], [address] [place] [address]  PHONE: [phone]    SUBJECTIVE:   Conchis Morrison is a 82 y.o. female 1935   seen for a follow up visit regarding the following:     Chief Complaint   Patient presents with    Hypertension    Atrial Fibrillation         History of present illness: 82 y.o. female presented for follow-up 8/16/22 last device interrogation 6/8/2022 with 2 greater than 3-second pauses noted. She has a history of syncope. Interval history:   Patient with paroxysmal atrial fibrillation, vasovagal syncope, hypertension, longstanding use of anticoagulation therapy. The patient was formerly seen by Dr. Montserrat Gorman. The patient presented for routine evaluation. Cardiac History:    Recurrent syncope. Echocardiogram 2012 with normal left ventricular systolic function. No major valvular disease. Mild mitral regurgitation. Implantable loop recorder placed 2019 for recurrent syncope. 1/28/21 Sinus  Bradycardia Low voltage in precordial leads. Decreasing R-wave progression -may be secondary to pulmonary disease   consider old anterior infarct. 10/2024-second sinus pause on monitor. Symptoms    2/9/2022 device interrogation no events of atrial fibrillation no bradycardia arrhythmias or significant pauses noted. 2/10/2022 sinus bradycardia normal SC intervals, ST wave normal, normal axis  8/2022 echocardiogram ejection fraction 55 to 60% no significant mitral regurgitation noted normal diastolic function      Assessment and Plan:      Syncope, controlled. Patient with loop recorder in place. .    We discussed probable need for pacemaker in the future if diease progresses. History of atrial fibrillation. ECGs were reviewed with sinus bradycardia no recent afib      Long term use of anticoagulation therapy. On Eliquis. Long term use of antiarrhythmic therapy.      On Flecainide therapy with no daily    propranolol (INDERAL) 10 MG tablet TAKE 1 TABLET BY MOUTH TWO TIMES DAILY    timolol (TIMOPTIC) 0.5 % ophthalmic solution Apply 1 drop to eye 2 times daily    triamterene-hydroCHLOROthiazide (MAXZIDE) 75-50 MG per tablet Take by mouth daily    Vitamin A 2400 MCG (8000 UT) CAPS Take 8,000 Units by mouth daily    doxycycline hyclate (VIBRAMYCIN) 100 MG capsule Take 100 mg by mouth 2 times daily (Patient not taking: Reported on 8/16/2022)     No current facility-administered medications for this visit. Past Medical History, Past Surgical History, Family history, Social History, and Medications were all reviewed with the patient today and updated as necessary.        Allergies   Allergen Reactions    Aloe Vera Other (See Comments)    Metoclopramide Other (See Comments)    Morphine Other (See Comments)    Penicillins Other (See Comments)    Sulfa Antibiotics Other (See Comments)    Sulfamethoxazole-Trimethoprim Other (See Comments)     Past Medical History:   Diagnosis Date    Chest pain 10/30/2015    Dyspnea     GERD (gastroesophageal reflux disease)     Headache     HTN (hypertension), benign 10/30/2015    Malaise and fatigue     Paroxysmal atrial fibrillation ([facility] [address] 10/30/2015    Preoperative cardiovascular examination     Psychiatric disorder     PUD (peptic ulcer disease)     Thromboembolus ([address]     leg    Thyroid disease      Past Surgical History:   Procedure Laterality Date    BREAST REDUCTION SURGERY      HYSTERECTOMY (CERVIX STATUS UNKNOWN)      LAP,CHOLECYSTECTOMY      AL APPENDECTOMY       Family History   Problem Relation Age of Onset    Diabetes Brother     Cancer Father         liver    Cancer Brother         prostate    Heart Attack Brother     Cancer Mother         non hog    Diabetes Mother       Social History     Tobacco Use    Smoking status: Former     Packs/day: 1.00     Types: Cigarettes    Smokeless tobacco: Former   Substance Use Topics    Alcohol use: No       ROS:    Review of Systems   Constitutional: Negative for fever. HENT:  Negative for stridor. Eyes:  Negative for pain. Cardiovascular:  Negative for chest pain. Respiratory:  Negative for cough. Endocrine: Negative for cold intolerance. Skin:  Negative for nail changes. Musculoskeletal:  Negative for arthritis. Gastrointestinal:  Negative for abdominal pain. Genitourinary:  Negative for dysuria. Neurological:  Negative for aphonia. Psychiatric/Behavioral:  Negative for altered mental status. Allergic/Immunologic: Negative for hives. PHYSICAL EXAM:    /74   Pulse 52   Ht 5' 6.5\" (1.689 m)   Wt 196 lb (88.9 kg)   BMI 31.16 kg/m²        Wt Readings from Last 3 Encounters:   08/16/22 196 lb (88.9 kg)   02/10/22 203 lb 9.6 oz (92.4 kg)   08/05/21 190 lb 6.4 oz (86.4 kg)     BP Readings from Last 3 Encounters:   08/16/22 128/74   02/10/22 138/70   08/05/21 136/68         Physical Exam  Vitals reviewed. HENT:      Head: Normocephalic. Right Ear: External ear normal.      Left Ear: External ear normal.      Nose: Nose normal.   Eyes:      General: No scleral icterus. Pulmonary:      Effort: Pulmonary effort is normal.   Abdominal:      General: There is no distension. Musculoskeletal:      Cervical back: Neck supple. Skin:     General: Skin is warm. Neurological:      Mental Status: She is alert. Mental status is at baseline. Medical problems and test results were reviewed with the patient today.            Recent Results (from the past 672 hour(s))   Transthoracic echocardiogram (TTE) complete with contrast, bubble, strain, and 3D PRN    Collection Time: 08/08/22  9:35 AM   Result Value Ref Range    IVSd 1.3 (A) 0.6 - 0.9 cm    LVIDd 4.9 3.9 - 5.3 cm    LVIDs 3.6 cm    LVPWd 1.2 (A) 0.6 - 0.9 cm    EF BP 67 55 - 100 %    LV Ejection Fraction A2C 69 %    LV Ejection Fraction A4C 65 %    LV EDV A2C 71 mL    LV EDV A4C 91 mL    LV EDV BP 89 56 - 104 mL    LV ESV A2C 22 mL    LV ESV A4C 32 mL    LV ESV BP 29 19 - 49 mL    LVOT Peak Gradient 4 mmHg    LVOT Peak Velocity 1.0 m/s    RVIDd 3.1 cm    RVSP 15 mmHg    LA Diameter 4.0 cm    LA Volume A/L 226 mL    LA Volume 2C 121 (A) 22 - 52 mL    LA Volume 4C 49 22 - 52 mL    LA Volume BP 36 22 - 52 mL    Est. RA Pressure 3 mmHg    AV Peak Gradient 7 mmHg    AV Peak Velocity 1.3 m/s    MV A Velocity 0.79 m/s    MV E Wave Deceleration Time 224.9 ms    MV E Velocity 0.66 m/s    LV E' Lateral Velocity 7 cm/s    LV E' Septal Velocity 7 cm/s    PV Peak Gradient 5 mmHg    PV Max Velocity 1.1 m/s    TAPSE 2.2 1.7 cm    TR Peak Gradient 13 mmHg    TR Max Velocity 1.74 m/s    Aortic Root 3.1 cm    Fractional Shortening 2D 27 28 - 44 %    LV RWT Ratio 0.49     LV Mass 2D 239.9 (A) 67 - 162 g    MV E/A 0.84     E/E' Ratio (Averaged) 9.43     E/E' Lateral 9.43     E/E' Septal 9.43     LA/AO Root Ratio 1.29     AV Velocity Ratio 0.77     RV Basal Dimension 3.9 cm    RV Mid Dimension 2.8 cm     No results found for: CHOL, CHOLPOCT, CHOLX, CHLST, CHOLV, HDL, HDLPOC, HDLC, LDL, LDLC, VLDLC, VLDL, TGLX, TRIGL           PLAN  Henry Born was seen today for hypertension and atrial fibrillation. Diagnoses and all orders for this visit:    Bradycardia, unspecified  -     Case Request Cardiac Cath Lab  -     Basic Metabolic Panel; Future  -     CBC with Auto Differential; Future    Long term (current) use of anticoagulants    Paroxysmal atrial fibrillation (HCC)    Essential (primary) hypertension    Other orders  -     apixaban (ELIQUIS) 5 MG TABS tablet; TAKE 1 TABLET BY MOUTH  TWICE DAILY hold 48 hours prior to pacemaker placement    Return in about 6 months (around 2/16/2023).        Le Pedro MD  8/16/2022  9:10 AM

## 2022-10-09 DIAGNOSIS — R55 SYNCOPE: ICD-10-CM

## 2022-10-09 DIAGNOSIS — I48.0 PAROXYSMAL ATRIAL FIBRILLATION (HCC): ICD-10-CM

## 2022-10-09 DIAGNOSIS — Z95.818 STATUS POST PLACEMENT OF IMPLANTABLE LOOP RECORDER: ICD-10-CM

## 2022-10-09 DIAGNOSIS — I48.0 PAROXYSMAL ATRIAL FIBRILLATION (HCC): Primary | ICD-10-CM

## 2022-10-19 ENCOUNTER — APPOINTMENT (OUTPATIENT)
Dept: GENERAL RADIOLOGY | Age: 87
End: 2022-10-19
Attending: INTERNAL MEDICINE
Payer: MEDICARE

## 2022-10-19 ENCOUNTER — HOSPITAL ENCOUNTER (OUTPATIENT)
Age: 87
Discharge: HOME | End: 2022-10-20
Attending: INTERNAL MEDICINE | Admitting: INTERNAL MEDICINE
Payer: MEDICARE

## 2022-10-19 DIAGNOSIS — R00.1 BRADYCARDIA: ICD-10-CM

## 2022-10-19 LAB
ANION GAP SERPL CALC-SCNC: 2 MMOL/L (ref 2–11)
BASOPHILS # BLD: 0 K/UL (ref 0–0.2)
BASOPHILS NFR BLD: 1 % (ref 0–2)
BUN SERPL-MCNC: 16 MG/DL (ref 8–23)
CALCIUM SERPL-MCNC: 9.5 MG/DL (ref 8.3–10.4)
CHLORIDE SERPL-SCNC: 106 MMOL/L (ref 101–110)
CO2 SERPL-SCNC: 30 MMOL/L (ref 21–32)
CREAT SERPL-MCNC: 1 MG/DL (ref 0.6–1)
DIFFERENTIAL METHOD BLD: ABNORMAL
ECHO BSA: 2.03 M2
EKG ATRIAL RATE: 50 BPM
EKG DIAGNOSIS: NORMAL
EKG P AXIS: 69 DEGREES
EKG P-R INTERVAL: 196 MS
EKG Q-T INTERVAL: 470 MS
EKG QRS DURATION: 96 MS
EKG QTC CALCULATION (BAZETT): 428 MS
EKG R AXIS: 7 DEGREES
EKG T AXIS: -7 DEGREES
EKG VENTRICULAR RATE: 50 BPM
EOSINOPHIL # BLD: 0.2 K/UL (ref 0–0.8)
EOSINOPHIL NFR BLD: 5 % (ref 0.5–7.8)
ERYTHROCYTE [DISTWIDTH] IN BLOOD BY AUTOMATED COUNT: 14.1 % (ref 11.9–14.6)
GLUCOSE SERPL-MCNC: 83 MG/DL (ref 65–100)
HCT VFR BLD AUTO: 30.6 % (ref 35.8–46.3)
HGB BLD-MCNC: 9.8 G/DL (ref 11.7–15.4)
IMM GRANULOCYTES # BLD AUTO: 0 K/UL (ref 0–0.5)
IMM GRANULOCYTES NFR BLD AUTO: 0 % (ref 0–5)
LYMPHOCYTES # BLD: 0.9 K/UL (ref 0.5–4.6)
LYMPHOCYTES NFR BLD: 18 % (ref 13–44)
MCH RBC QN AUTO: 31.8 PG (ref 26.1–32.9)
MCHC RBC AUTO-ENTMCNC: 32 G/DL (ref 31.4–35)
MCV RBC AUTO: 99.4 FL (ref 82–102)
MONOCYTES # BLD: 0.3 K/UL (ref 0.1–1.3)
MONOCYTES NFR BLD: 7 % (ref 4–12)
NEUTS SEG # BLD: 3.4 K/UL (ref 1.7–8.2)
NEUTS SEG NFR BLD: 69 % (ref 43–78)
NRBC # BLD: 0 K/UL (ref 0–0.2)
PLATELET # BLD AUTO: 192 K/UL (ref 150–450)
PMV BLD AUTO: 9.6 FL (ref 9.4–12.3)
POTASSIUM SERPL-SCNC: 3.4 MMOL/L (ref 3.5–5.1)
RBC # BLD AUTO: 3.08 M/UL (ref 4.05–5.2)
SODIUM SERPL-SCNC: 138 MMOL/L (ref 133–143)
WBC # BLD AUTO: 4.9 K/UL (ref 4.3–11.1)

## 2022-10-19 PROCEDURE — 6370000000 HC RX 637 (ALT 250 FOR IP): Performed by: INTERNAL MEDICINE

## 2022-10-19 PROCEDURE — 71045 X-RAY EXAM CHEST 1 VIEW: CPT

## 2022-10-19 PROCEDURE — 2709999900 HC NON-CHARGEABLE SUPPLY: Performed by: INTERNAL MEDICINE

## 2022-10-19 PROCEDURE — C1898 LEAD, PMKR, OTHER THAN TRANS: HCPCS | Performed by: INTERNAL MEDICINE

## 2022-10-19 PROCEDURE — C1785 PMKR, DUAL, RATE-RESP: HCPCS | Performed by: INTERNAL MEDICINE

## 2022-10-19 PROCEDURE — 2580000003 HC RX 258: Performed by: INTERNAL MEDICINE

## 2022-10-19 PROCEDURE — 99152 MOD SED SAME PHYS/QHP 5/>YRS: CPT | Performed by: INTERNAL MEDICINE

## 2022-10-19 PROCEDURE — 6370000000 HC RX 637 (ALT 250 FOR IP): Performed by: NURSE PRACTITIONER

## 2022-10-19 PROCEDURE — 6360000002 HC RX W HCPCS: Performed by: INTERNAL MEDICINE

## 2022-10-19 PROCEDURE — 33208 INSRT HEART PM ATRIAL & VENT: CPT | Performed by: INTERNAL MEDICINE

## 2022-10-19 PROCEDURE — 99153 MOD SED SAME PHYS/QHP EA: CPT | Performed by: INTERNAL MEDICINE

## 2022-10-19 PROCEDURE — C1894 INTRO/SHEATH, NON-LASER: HCPCS | Performed by: INTERNAL MEDICINE

## 2022-10-19 PROCEDURE — 6360000004 HC RX CONTRAST MEDICATION: Performed by: INTERNAL MEDICINE

## 2022-10-19 PROCEDURE — C1892 INTRO/SHEATH,FIXED,PEEL-AWAY: HCPCS | Performed by: INTERNAL MEDICINE

## 2022-10-19 PROCEDURE — 93005 ELECTROCARDIOGRAM TRACING: CPT | Performed by: INTERNAL MEDICINE

## 2022-10-19 PROCEDURE — 85025 COMPLETE CBC W/AUTO DIFF WBC: CPT

## 2022-10-19 PROCEDURE — 33286 RMVL SUBQ CAR RHYTHM MNTR: CPT | Performed by: INTERNAL MEDICINE

## 2022-10-19 PROCEDURE — 80048 BASIC METABOLIC PNL TOTAL CA: CPT

## 2022-10-19 PROCEDURE — 33207 INSERT HEART PM VENTRICULAR: CPT | Performed by: INTERNAL MEDICINE

## 2022-10-19 PROCEDURE — 2500000003 HC RX 250 WO HCPCS: Performed by: INTERNAL MEDICINE

## 2022-10-19 DEVICE — PACEMAKER
Type: IMPLANTABLE DEVICE | Status: FUNCTIONAL
Brand: ACCOLADE™ MRI DR

## 2022-10-19 DEVICE — PACE/SENSE LEAD
Type: IMPLANTABLE DEVICE | Status: FUNCTIONAL
Brand: INGEVITY™+

## 2022-10-19 RX ORDER — MIDAZOLAM HYDROCHLORIDE 1 MG/ML
INJECTION INTRAMUSCULAR; INTRAVENOUS PRN
Status: DISCONTINUED | OUTPATIENT
Start: 2022-10-19 | End: 2022-10-19 | Stop reason: HOSPADM

## 2022-10-19 RX ORDER — FLECAINIDE ACETATE 50 MG/1
50 TABLET ORAL 2 TIMES DAILY
Status: DISCONTINUED | OUTPATIENT
Start: 2022-10-19 | End: 2022-10-20 | Stop reason: HOSPADM

## 2022-10-19 RX ORDER — PANTOPRAZOLE SODIUM 40 MG/1
40 TABLET, DELAYED RELEASE ORAL
Status: DISCONTINUED | OUTPATIENT
Start: 2022-10-20 | End: 2022-10-20 | Stop reason: HOSPADM

## 2022-10-19 RX ORDER — SODIUM CHLORIDE 9 MG/ML
INJECTION, SOLUTION INTRAVENOUS CONTINUOUS
Status: DISCONTINUED | OUTPATIENT
Start: 2022-10-19 | End: 2022-10-20 | Stop reason: HOSPADM

## 2022-10-19 RX ORDER — FLUOXETINE HYDROCHLORIDE 20 MG/1
40 CAPSULE ORAL DAILY
Status: DISCONTINUED | OUTPATIENT
Start: 2022-10-19 | End: 2022-10-19 | Stop reason: SDUPTHER

## 2022-10-19 RX ORDER — POTASSIUM CHLORIDE 750 MG/1
10 TABLET, EXTENDED RELEASE ORAL NIGHTLY
Status: DISCONTINUED | OUTPATIENT
Start: 2022-10-19 | End: 2022-10-20 | Stop reason: HOSPADM

## 2022-10-19 RX ORDER — DIPHENHYDRAMINE HCL 25 MG
25 CAPSULE ORAL NIGHTLY
Status: DISCONTINUED | OUTPATIENT
Start: 2022-10-19 | End: 2022-10-20 | Stop reason: HOSPADM

## 2022-10-19 RX ORDER — SODIUM CHLORIDE 9 MG/ML
INJECTION, SOLUTION INTRAVENOUS PRN
Status: DISCONTINUED | OUTPATIENT
Start: 2022-10-19 | End: 2022-10-20 | Stop reason: HOSPADM

## 2022-10-19 RX ORDER — HYDROCODONE BITARTRATE AND ACETAMINOPHEN 10; 325 MG/1; MG/1
1 TABLET ORAL EVERY 6 HOURS
Status: DISCONTINUED | OUTPATIENT
Start: 2022-10-19 | End: 2022-10-19

## 2022-10-19 RX ORDER — NITROGLYCERIN 0.4 MG/1
0.4 TABLET SUBLINGUAL EVERY 5 MIN PRN
Status: DISCONTINUED | OUTPATIENT
Start: 2022-10-19 | End: 2022-10-20 | Stop reason: HOSPADM

## 2022-10-19 RX ORDER — SODIUM CHLORIDE 0.9 % (FLUSH) 0.9 %
5-40 SYRINGE (ML) INJECTION PRN
Status: DISCONTINUED | OUTPATIENT
Start: 2022-10-19 | End: 2022-10-20 | Stop reason: HOSPADM

## 2022-10-19 RX ORDER — TRIAMTERENE AND HYDROCHLOROTHIAZIDE 75; 50 MG/1; MG/1
1 TABLET ORAL DAILY
Status: DISCONTINUED | OUTPATIENT
Start: 2022-10-19 | End: 2022-10-20 | Stop reason: HOSPADM

## 2022-10-19 RX ORDER — HYDROCODONE BITARTRATE AND ACETAMINOPHEN 10; 325 MG/1; MG/1
1 TABLET ORAL EVERY 6 HOURS
Status: DISCONTINUED | OUTPATIENT
Start: 2022-10-19 | End: 2022-10-20 | Stop reason: HOSPADM

## 2022-10-19 RX ORDER — AMITRIPTYLINE HYDROCHLORIDE 50 MG/1
25 TABLET, FILM COATED ORAL NIGHTLY
Status: DISCONTINUED | OUTPATIENT
Start: 2022-10-19 | End: 2022-10-20 | Stop reason: HOSPADM

## 2022-10-19 RX ORDER — LIDOCAINE HYDROCHLORIDE 10 MG/ML
INJECTION, SOLUTION INFILTRATION; PERINEURAL PRN
Status: DISCONTINUED | OUTPATIENT
Start: 2022-10-19 | End: 2022-10-19 | Stop reason: HOSPADM

## 2022-10-19 RX ORDER — AMLODIPINE BESYLATE 5 MG/1
2.5 TABLET ORAL DAILY
Status: DISCONTINUED | OUTPATIENT
Start: 2022-10-20 | End: 2022-10-20 | Stop reason: HOSPADM

## 2022-10-19 RX ORDER — FLUOXETINE HYDROCHLORIDE 20 MG/1
60 CAPSULE ORAL DAILY
Status: DISCONTINUED | OUTPATIENT
Start: 2022-10-20 | End: 2022-10-20 | Stop reason: HOSPADM

## 2022-10-19 RX ORDER — ACETAMINOPHEN 325 MG/1
650 TABLET ORAL EVERY 4 HOURS PRN
Status: DISCONTINUED | OUTPATIENT
Start: 2022-10-19 | End: 2022-10-20 | Stop reason: HOSPADM

## 2022-10-19 RX ORDER — SODIUM CHLORIDE 0.9 % (FLUSH) 0.9 %
5-40 SYRINGE (ML) INJECTION EVERY 12 HOURS SCHEDULED
Status: DISCONTINUED | OUTPATIENT
Start: 2022-10-19 | End: 2022-10-20 | Stop reason: HOSPADM

## 2022-10-19 RX ORDER — LEVOTHYROXINE SODIUM 0.1 MG/1
100 TABLET ORAL
Status: DISCONTINUED | OUTPATIENT
Start: 2022-10-20 | End: 2022-10-20 | Stop reason: HOSPADM

## 2022-10-19 RX ORDER — PROPRANOLOL HYDROCHLORIDE 10 MG/1
10 TABLET ORAL 3 TIMES DAILY
Status: DISCONTINUED | OUTPATIENT
Start: 2022-10-19 | End: 2022-10-20 | Stop reason: HOSPADM

## 2022-10-19 RX ORDER — LIDOCAINE HYDROCHLORIDE AND EPINEPHRINE 10; 10 MG/ML; UG/ML
INJECTION, SOLUTION INFILTRATION; PERINEURAL PRN
Status: DISCONTINUED | OUTPATIENT
Start: 2022-10-19 | End: 2022-10-19 | Stop reason: HOSPADM

## 2022-10-19 RX ADMIN — DIPHENHYDRAMINE HYDROCHLORIDE 25 MG: 25 CAPSULE ORAL at 23:35

## 2022-10-19 RX ADMIN — PROPRANOLOL HYDROCHLORIDE 10 MG: 10 TABLET ORAL at 16:26

## 2022-10-19 RX ADMIN — POTASSIUM CHLORIDE 10 MEQ: 750 TABLET, EXTENDED RELEASE ORAL at 20:16

## 2022-10-19 RX ADMIN — CEFAZOLIN 1000 MG: 1 INJECTION, POWDER, FOR SOLUTION INTRAMUSCULAR; INTRAVENOUS at 16:28

## 2022-10-19 RX ADMIN — AMITRIPTYLINE HYDROCHLORIDE 25 MG: 50 TABLET, FILM COATED ORAL at 20:16

## 2022-10-19 RX ADMIN — SODIUM CHLORIDE, PRESERVATIVE FREE 10 ML: 5 INJECTION INTRAVENOUS at 20:19

## 2022-10-19 RX ADMIN — CEFAZOLIN 1000 MG: 1 INJECTION, POWDER, FOR SOLUTION INTRAMUSCULAR; INTRAVENOUS at 23:35

## 2022-10-19 RX ADMIN — HYDROCODONE BITARTRATE AND ACETAMINOPHEN 1 TABLET: 10; 325 TABLET ORAL at 12:05

## 2022-10-19 RX ADMIN — HYDROCODONE BITARTRATE AND ACETAMINOPHEN 1 TABLET: 10; 325 TABLET ORAL at 20:16

## 2022-10-19 RX ADMIN — PROPRANOLOL HYDROCHLORIDE 10 MG: 10 TABLET ORAL at 20:16

## 2022-10-19 RX ADMIN — TRIAMTERENE AND HYDROCHLOROTHIAZIDE 1 TABLET: 75; 50 TABLET ORAL at 16:28

## 2022-10-19 RX ADMIN — FLECAINIDE ACETATE 50 MG: 50 TABLET ORAL at 20:16

## 2022-10-19 ASSESSMENT — PAIN SCALES - GENERAL
PAINLEVEL_OUTOF10: 0
PAINLEVEL_OUTOF10: 7

## 2022-10-19 ASSESSMENT — PAIN DESCRIPTION - ORIENTATION: ORIENTATION: LEFT

## 2022-10-19 ASSESSMENT — PAIN - FUNCTIONAL ASSESSMENT: PAIN_FUNCTIONAL_ASSESSMENT: NONE - DENIES PAIN

## 2022-10-19 ASSESSMENT — PAIN DESCRIPTION - DESCRIPTORS: DESCRIPTORS: ACHING

## 2022-10-19 ASSESSMENT — PAIN DESCRIPTION - LOCATION: LOCATION: BACK;SHOULDER

## 2022-10-19 NOTE — PROGRESS NOTES
TRANSFER - IN REPORT:    Verbal report received from [facility]) on Milind Haywood  being received from CCL(unit) for routine post-op      Report consisted of patients Situation, Background, Assessment and   Recommendations(SBAR). Information from the following report(s) Surgery Report was reviewed with the receiving nurse. Opportunity for questions and clarification was provided.       Assessment completed upon patients arrival to unit and care assume

## 2022-10-19 NOTE — PROGRESS NOTES
Sling placed on patient post procedure and patient as well as family educated on limitations with left arm. Food tray given.

## 2022-10-19 NOTE — Clinical Note
Prepped: left chest. Site was clipped and prepped. Prepped with: ChloraPrep. Patient was draped after wet prep solution dried.

## 2022-10-19 NOTE — PROGRESS NOTES
TRANSFER - OUT REPORT:    Verbal report given to RN(name) on Geri Hager  being transferred to 3 tele(unit) for routine post-op       Report consisted of patients Situation, Background, Assessment and   Recommendations(SBAR). Information from the following report(s) Surgery Report and MAR was reviewed with the receiving nurse. Opportunity for questions and clarification was provided.

## 2022-10-19 NOTE — H&P
800 [facility], [address] [place] [address], [address] [place] [address], [address] [place] [address]  PHONE: [phone]     SUBJECTIVE:   Sonia Rodas is a 82 y.o. female 1935   seen for a follow up visit regarding the following:          Chief Complaint   Patient presents with    Hypertension    Atrial Fibrillation            History of present illness: 82 y.o. female presented for follow-up 8/16/22 last device interrogation 6/8/2022 with 2 greater than 3-second pauses noted. She has a history of syncope. Interval history:   Patient with paroxysmal atrial fibrillation, vasovagal syncope, hypertension, longstanding use of anticoagulation therapy. The patient was formerly seen by Dr. Sung London. The patient presented for routine evaluation. Cardiac History:    Recurrent syncope. Echocardiogram 2012 with normal left ventricular systolic function. No major valvular disease. Mild mitral regurgitation. Implantable loop recorder placed 2019 for recurrent syncope. 1/28/21 Sinus  Bradycardia Low voltage in precordial leads. Decreasing R-wave progression -may be secondary to pulmonary disease   consider old anterior infarct. 10/2024-second sinus pause on monitor. Symptoms    2/9/2022 device interrogation no events of atrial fibrillation no bradycardia arrhythmias or significant pauses noted. 2/10/2022 sinus bradycardia normal CT intervals, ST wave normal, normal axis  8/2022 echocardiogram ejection fraction 55 to 60% no significant mitral regurgitation noted normal diastolic function      Assessment and Plan:      Syncope, controlled. Patient with loop recorder in place. .    We discussed probable need for pacemaker in the future if diease progresses. History of atrial fibrillation. ECGs were reviewed with sinus bradycardia no recent afib      Long term use of anticoagulation therapy. On Eliquis. Long term use of antiarrhythmic therapy.      On Flecainide therapy with no toxicities. Bradycardia. Patient with known sinus bradycardia. Additional device based therapies may be indicated in the future. Long discussion revisited 02/10/22 regarding device based therapies   She had a 4-second pause with in October 2020. Subsequent monitoring most recently 2/9/2022 no arrhythmias n  class II indications discussed   Risks benefits and alternative therapies of cardiac were discussed. Risks include bleeding, cardiac tamponade, pneumothorax stroke. Following discussion of these risks patient agrees to proceed with the procedure. Mitral regurgitation mild by last echo 2012. No significant murmur on exam today                      Current Outpatient Medications   Medication Sig    amLODIPine (NORVASC) 2.5 MG tablet      apixaban (ELIQUIS) 5 MG TABS tablet TAKE 1 TABLET BY MOUTH  TWICE DAILY hold 48 hours prior to pacemaker placement    amitriptyline (ELAVIL) 25 MG tablet Take 25 mg by mouth    diclofenac sodium (VOLTAREN) 1 % GEL Apply topically 4 times daily    ferrous sulfate (IRON 325) 325 (65 Fe) MG tablet Take 325 mg by mouth daily    flecainide (TAMBOCOR) 50 MG tablet TAKE 1 TABLET BY MOUTH TWO TIMES DAILY    FLUoxetine (PROZAC) 20 MG capsule Take 20 mg by mouth daily    FLUoxetine (PROZAC) 40 MG capsule Take 40 mg by mouth daily    HYDROcodone-acetaminophen (NORCO)  MG per tablet Take 1 tablet by mouth every 6 hours as needed. hydrOXYzine (ATARAX) 25 MG tablet Take 25 mg by mouth    levothyroxine (SYNTHROID) 50 MCG tablet Take by mouth every morning (before breakfast)    lisinopril (PRINIVIL;ZESTRIL) 20 MG tablet Take 20 mg by mouth daily    nitroGLYCERIN (NITROSTAT) 0.4 MG SL tablet Place 1 sl under the tongue q 5 min prn cp, max 3 sl in a 15-min time period. Call 911 if no relief after the 3rd sl.     omeprazole (PRILOSEC) 40 MG delayed release capsule Take 40 mg by mouth daily    potassium chloride (MICRO-K) 10 MEQ extended release capsule Take 10 mEq by mouth daily    propranolol (INDERAL) 10 MG tablet TAKE 1 TABLET BY MOUTH TWO TIMES DAILY    timolol (TIMOPTIC) 0.5 % ophthalmic solution Apply 1 drop to eye 2 times daily    triamterene-hydroCHLOROthiazide (MAXZIDE) 75-50 MG per tablet Take by mouth daily    Vitamin A 2400 MCG (8000 UT) CAPS Take 8,000 Units by mouth daily    doxycycline hyclate (VIBRAMYCIN) 100 MG capsule Take 100 mg by mouth 2 times daily (Patient not taking: Reported on 8/16/2022)      No current facility-administered medications for this visit. Past Medical History, Past Surgical History, Family history, Social History, and Medications were all reviewed with the patient today and updated as necessary.               Allergies   Allergen Reactions    Aloe Vera Other (See Comments)    Metoclopramide Other (See Comments)    Morphine Other (See Comments)    Penicillins Other (See Comments)    Sulfa Antibiotics Other (See Comments)    Sulfamethoxazole-Trimethoprim Other (See Comments)      Past Medical History        Past Medical History:   Diagnosis Date    Chest pain 10/30/2015    Dyspnea      GERD (gastroesophageal reflux disease)      Headache      HTN (hypertension), benign 10/30/2015    Malaise and fatigue      Paroxysmal atrial fibrillation ([facility] [address] 10/30/2015    Preoperative cardiovascular examination      Psychiatric disorder      PUD (peptic ulcer disease)      Thromboembolus ([facility] [address]       leg    Thyroid disease           Past Surgical History         Past Surgical History:   Procedure Laterality Date    BREAST REDUCTION SURGERY        HYSTERECTOMY (CERVIX STATUS UNKNOWN)        LAP,CHOLECYSTECTOMY        DC APPENDECTOMY             Family History         Family History   Problem Relation Age of Onset    Diabetes Brother      Cancer Father           liver    Cancer Brother           prostate    Heart Attack Brother      Cancer Mother           non hog    Diabetes Mother           Social History            Tobacco Use    Smoking status: Former       Packs/day: 1.00       Types: Cigarettes    Smokeless tobacco: Former   Substance Use Topics    Alcohol use: No         ROS:     Review of Systems   Constitutional: Negative for fever. HENT:  Negative for stridor. Eyes:  Negative for pain. Cardiovascular:  Negative for chest pain. Respiratory:  Negative for cough. Endocrine: Negative for cold intolerance. Skin:  Negative for nail changes. Musculoskeletal:  Negative for arthritis. Gastrointestinal:  Negative for abdominal pain. Genitourinary:  Negative for dysuria. Neurological:  Negative for aphonia. Psychiatric/Behavioral:  Negative for altered mental status. Allergic/Immunologic: Negative for hives. PHYSICAL EXAM:     /74   Pulse 52   Ht 5' 6.5\" (1.689 m)   Wt 196 lb (88.9 kg)   BMI 31.16 kg/m²             Wt Readings from Last 3 Encounters:   08/16/22 196 lb (88.9 kg)   02/10/22 203 lb 9.6 oz (92.4 kg)   08/05/21 190 lb 6.4 oz (86.4 kg)          BP Readings from Last 3 Encounters:   08/16/22 128/74   02/10/22 138/70   08/05/21 136/68            Physical Exam  Vitals reviewed. HENT:      Head: Normocephalic. Right Ear: External ear normal.      Left Ear: External ear normal.      Nose: Nose normal.   Eyes:      General: No scleral icterus. Pulmonary:      Effort: Pulmonary effort is normal.   Abdominal:      General: There is no distension. Musculoskeletal:      Cervical back: Neck supple. Skin:     General: Skin is warm. Neurological:      Mental Status: She is alert. Mental status is at baseline. Medical problems and test results were reviewed with the patient today.                Recent Results         Recent Results (from the past 672 hour(s))   Transthoracic echocardiogram (TTE) complete with contrast, bubble, strain, and 3D PRN     Collection Time: 08/08/22  9:35 AM   Result Value Ref Range     IVSd 1.3 (A) 0.6 - 0.9 cm     LVIDd 4.9 3.9 - 5.3 cm     LVIDs 3.6 cm     LVPWd 1.2 (A) 0.6 - 0.9 cm     EF BP 67 55 - 100 %     LV Ejection Fraction A2C 69 %     LV Ejection Fraction A4C 65 %     LV EDV A2C 71 mL     LV EDV A4C 91 mL     LV EDV BP 89 56 - 104 mL     LV ESV A2C 22 mL     LV ESV A4C 32 mL     LV ESV BP 29 19 - 49 mL     LVOT Peak Gradient 4 mmHg     LVOT Peak Velocity 1.0 m/s     RVIDd 3.1 cm     RVSP 15 mmHg     LA Diameter 4.0 cm     LA Volume A/L 226 mL     LA Volume 2C 121 (A) 22 - 52 mL     LA Volume 4C 49 22 - 52 mL     LA Volume BP 36 22 - 52 mL     Est. RA Pressure 3 mmHg     AV Peak Gradient 7 mmHg     AV Peak Velocity 1.3 m/s     MV A Velocity 0.79 m/s     MV E Wave Deceleration Time 224.9 ms     MV E Velocity 0.66 m/s     LV E' Lateral Velocity 7 cm/s     LV E' Septal Velocity 7 cm/s     PV Peak Gradient 5 mmHg     PV Max Velocity 1.1 m/s     TAPSE 2.2 1.7 cm     TR Peak Gradient 13 mmHg     TR Max Velocity 1.74 m/s     Aortic Root 3.1 cm     Fractional Shortening 2D 27 28 - 44 %     LV RWT Ratio 0.49       LV Mass 2D 239.9 (A) 67 - 162 g     MV E/A 0.84       E/E' Ratio (Averaged) 9.43       E/E' Lateral 9.43       E/E' Septal 9.43       LA/AO Root Ratio 1.29       AV Velocity Ratio 0.77       RV Basal Dimension 3.9 cm     RV Mid Dimension 2.8 cm         No results found for: CHOL, CHOLPOCT, CHOLX, CHLST, CHOLV, HDL, HDLPOC, HDLC, LDL, LDLC, VLDLC, VLDL, TGLX, TRIGL

## 2022-10-19 NOTE — PROGRESS NOTES
Patient received to [address] [place] [address] room # 9. Ambulatory from [facility]. Patient scheduled for PPM- Dual with Loop recorder removal today with Dr Kasey Jiang. Procedure reviewed & questions answered, voiced good understanding consent obtained & placed on chart. All medications and medical history reviewed. Will prep patient per orders. Patient & family updated on plan of care. The patient has a fraility score of 3-MANAGING WELL, based on ability to ambulated independently and to complete own ADLs.

## 2022-10-19 NOTE — CONSENT
[address] [facility], [address] [place] [address], [address] [place] [address], [address] [place] [address]  PHONE: [phone]     SUBJECTIVE:   Sylvester Hernandez is a 82 y.o. female 1935   seen for a follow up visit regarding the following:          Chief Complaint   Patient presents with    Hypertension    Atrial Fibrillation            History of present illness: 82 y.o. female presented for follow-up 8/16/22 last device interrogation 6/8/2022 with 2 greater than 3-second pauses noted. She has a history of syncope. Interval history:   Patient with paroxysmal atrial fibrillation, vasovagal syncope, hypertension, longstanding use of anticoagulation therapy. The patient was formerly seen by Dr. Annie Joseph. The patient presented for routine evaluation. Cardiac History:    Recurrent syncope. Echocardiogram 2012 with normal left ventricular systolic function. No major valvular disease. Mild mitral regurgitation. Implantable loop recorder placed 2019 for recurrent syncope. 1/28/21 Sinus  Bradycardia Low voltage in precordial leads. Decreasing R-wave progression -may be secondary to pulmonary disease   consider old anterior infarct. 10/2024-second sinus pause on monitor. Symptoms    2/9/2022 device interrogation no events of atrial fibrillation no bradycardia arrhythmias or significant pauses noted. 2/10/2022 sinus bradycardia normal OH intervals, ST wave normal, normal axis  8/2022 echocardiogram ejection fraction 55 to 60% no significant mitral regurgitation noted normal diastolic function      Assessment and Plan:      Syncope, controlled. Patient with loop recorder in place. Welch Hidden History of atrial fibrillation. ECGs were reviewed with sinus bradycardia no recent afib      Long term use of anticoagulation therapy. On Eliquis. Long term use of antiarrhythmic therapy. On Flecainide therapy with no toxicities. Bradycardia. Patient with known sinus bradycardia. Additional device based therapies may be indicated in the future. Long discussion revisited 02/10/22 regarding device based therapies   She had a 4-second pause with in October 2020. Subsequent monitoring most recently 2/9/2022 no arrhythmias n  class II indications discussed   Risks benefits and alternative therapies of cardiac were discussed. Risks include bleeding, cardiac tamponade, pneumothorax stroke. Following discussion of these risks patient agrees to proceed with the procedure. Mitral regurgitation mild by last echo 2012. No significant murmur on exam today                      Current Outpatient Medications   Medication Sig    amLODIPine (NORVASC) 2.5 MG tablet      apixaban (ELIQUIS) 5 MG TABS tablet TAKE 1 TABLET BY MOUTH  TWICE DAILY hold 48 hours prior to pacemaker placement    amitriptyline (ELAVIL) 25 MG tablet Take 25 mg by mouth    diclofenac sodium (VOLTAREN) 1 % GEL Apply topically 4 times daily    ferrous sulfate (IRON 325) 325 (65 Fe) MG tablet Take 325 mg by mouth daily    flecainide (TAMBOCOR) 50 MG tablet TAKE 1 TABLET BY MOUTH TWO TIMES DAILY    FLUoxetine (PROZAC) 20 MG capsule Take 20 mg by mouth daily    FLUoxetine (PROZAC) 40 MG capsule Take 40 mg by mouth daily    HYDROcodone-acetaminophen (NORCO)  MG per tablet Take 1 tablet by mouth every 6 hours as needed. hydrOXYzine (ATARAX) 25 MG tablet Take 25 mg by mouth    levothyroxine (SYNTHROID) 50 MCG tablet Take by mouth every morning (before breakfast)    lisinopril (PRINIVIL;ZESTRIL) 20 MG tablet Take 20 mg by mouth daily    nitroGLYCERIN (NITROSTAT) 0.4 MG SL tablet Place 1 sl under the tongue q 5 min prn cp, max 3 sl in a 15-min time period. Call 911 if no relief after the 3rd sl.     omeprazole (PRILOSEC) 40 MG delayed release capsule Take 40 mg by mouth daily    potassium chloride (MICRO-K) 10 MEQ extended release capsule Take 10 mEq by mouth daily    propranolol (INDERAL) 10 MG tablet TAKE 1 TABLET BY MOUTH TWO TIMES DAILY    timolol (TIMOPTIC) 0.5 % ophthalmic solution Apply 1 drop to eye 2 times daily    triamterene-hydroCHLOROthiazide (MAXZIDE) 75-50 MG per tablet Take by mouth daily    Vitamin A 2400 MCG (8000 UT) CAPS Take 8,000 Units by mouth daily    doxycycline hyclate (VIBRAMYCIN) 100 MG capsule Take 100 mg by mouth 2 times daily (Patient not taking: Reported on 8/16/2022)      No current facility-administered medications for this visit. Past Medical History, Past Surgical History, Family history, Social History, and Medications were all reviewed with the patient today and updated as necessary.               Allergies   Allergen Reactions    Aloe Vera Other (See Comments)    Metoclopramide Other (See Comments)    Morphine Other (See Comments)    Penicillins Other (See Comments)    Sulfa Antibiotics Other (See Comments)    Sulfamethoxazole-Trimethoprim Other (See Comments)      Past Medical History        Past Medical History:   Diagnosis Date    Chest pain 10/30/2015    Dyspnea      GERD (gastroesophageal reflux disease)      Headache      HTN (hypertension), benign 10/30/2015    Malaise and fatigue      Paroxysmal atrial fibrillation ([facility] 10/30/2015    Preoperative cardiovascular examination      Psychiatric disorder      PUD (peptic ulcer disease)      Thromboembolus ([facility]       leg    Thyroid disease           Past Surgical History         Past Surgical History:   Procedure Laterality Date    BREAST REDUCTION SURGERY        HYSTERECTOMY (CERVIX STATUS UNKNOWN)        LAP,CHOLECYSTECTOMY        NV APPENDECTOMY             Family History         Family History   Problem Relation Age of Onset    Diabetes Brother      Cancer Father           liver    Cancer Brother           prostate    Heart Attack Brother      Cancer Mother           non hog    Diabetes Mother           Social History            Tobacco Use    Smoking status: Former       Packs/day: 1.00       Types: Cigarettes    Smokeless tobacco: Former   Substance Use Topics    Alcohol use: No         ROS:     Review of Systems   Constitutional: Negative for fever. HENT:  Negative for stridor. Eyes:  Negative for pain. Cardiovascular:  Negative for chest pain. Respiratory:  Negative for cough. Endocrine: Negative for cold intolerance. Skin:  Negative for nail changes. Musculoskeletal:  Negative for arthritis. Gastrointestinal:  Negative for abdominal pain. Genitourinary:  Negative for dysuria. Neurological:  Negative for aphonia. Psychiatric/Behavioral:  Negative for altered mental status. Allergic/Immunologic: Negative for hives. PHYSICAL EXAM:     /74   Pulse 52   Ht 5' 6.5\" (1.689 m)   Wt 196 lb (88.9 kg)   BMI 31.16 kg/m²             Wt Readings from Last 3 Encounters:   08/16/22 196 lb (88.9 kg)   02/10/22 203 lb 9.6 oz (92.4 kg)   08/05/21 190 lb 6.4 oz (86.4 kg)          BP Readings from Last 3 Encounters:   08/16/22 128/74   02/10/22 138/70   08/05/21 136/68            Physical Exam  Vitals reviewed. HENT:      Head: Normocephalic. Right Ear: External ear normal.      Left Ear: External ear normal.      Nose: Nose normal.   Eyes:      General: No scleral icterus. Pulmonary:      Effort: Pulmonary effort is normal.   Abdominal:      General: There is no distension. Musculoskeletal:      Cervical back: Neck supple. Skin:     General: Skin is warm. Neurological:      Mental Status: She is alert. Mental status is at baseline. Medical problems and test results were reviewed with the patient today.                Recent Results         Recent Results (from the past 672 hour(s))   Transthoracic echocardiogram (TTE) complete with contrast, bubble, strain, and 3D PRN     Collection Time: 08/08/22  9:35 AM   Result Value Ref Range     IVSd 1.3 (A) 0.6 - 0.9 cm     LVIDd 4.9 3.9 - 5.3 cm     LVIDs 3.6 cm     LVPWd 1.2 (A) 0.6 - 0.9 cm     EF BP 67 55 - 100 %     LV Ejection Fraction A2C 69 % LV Ejection Fraction A4C 65 %     LV EDV A2C 71 mL     LV EDV A4C 91 mL     LV EDV BP 89 56 - 104 mL     LV ESV A2C 22 mL     LV ESV A4C 32 mL     LV ESV BP 29 19 - 49 mL     LVOT Peak Gradient 4 mmHg     LVOT Peak Velocity 1.0 m/s     RVIDd 3.1 cm     RVSP 15 mmHg     LA Diameter 4.0 cm     LA Volume A/L 226 mL     LA Volume 2C 121 (A) 22 - 52 mL     LA Volume 4C 49 22 - 52 mL     LA Volume BP 36 22 - 52 mL     Est. RA Pressure 3 mmHg     AV Peak Gradient 7 mmHg     AV Peak Velocity 1.3 m/s     MV A Velocity 0.79 m/s     MV E Wave Deceleration Time 224.9 ms     MV E Velocity 0.66 m/s     LV E' Lateral Velocity 7 cm/s     LV E' Septal Velocity 7 cm/s     PV Peak Gradient 5 mmHg     PV Max Velocity 1.1 m/s     TAPSE 2.2 1.7 cm     TR Peak Gradient 13 mmHg     TR Max Velocity 1.74 m/s     Aortic Root 3.1 cm     Fractional Shortening 2D 27 28 - 44 %     LV RWT Ratio 0.49       LV Mass 2D 239.9 (A) 67 - 162 g     MV E/A 0.84       E/E' Ratio (Averaged) 9.43       E/E' Lateral 9.43       E/E' Septal 9.43       LA/AO Root Ratio 1.29       AV Velocity Ratio 0.77       RV Basal Dimension 3.9 cm     RV Mid Dimension 2.8 cm         No results found for: CHOL, CHOLPOCT, CHOLX, CHLST, CHOLV, HDL, HDLPOC, HDLC, LDL, LDLC, VLDLC, VLDL, TGLX, TRIGL

## 2022-10-19 NOTE — PROGRESS NOTES
TRANSFER - OUT REPORT:    Verbal report given to RN on Pablo Gibbs  being transferred to [facility] for routine progression of patient care       Report consisted of patient's Situation, Background, Assessment and   Recommendations(SBAR). Information from the following report(s) Nurse Handoff Report was reviewed with the receiving nurse. Dual Chamber permanent pacemaker with Dr. Meggan Pena   DDDR   10 mg versed  100 mcg fentanyl  L chest,   Suture, staples.  Aquacel

## 2022-10-19 NOTE — Clinical Note
sutured in place using other (document in comment) sutures.  0 Ti-Cron (sheath split and removed prior)

## 2022-10-20 ENCOUNTER — APPOINTMENT (OUTPATIENT)
Dept: GENERAL RADIOLOGY | Age: 87
End: 2022-10-20
Attending: INTERNAL MEDICINE
Payer: MEDICARE

## 2022-10-20 VITALS
RESPIRATION RATE: 18 BRPM | DIASTOLIC BLOOD PRESSURE: 60 MMHG | SYSTOLIC BLOOD PRESSURE: 109 MMHG | TEMPERATURE: 98.4 F | HEART RATE: 58 BPM | BODY MASS INDEX: 32.92 KG/M2 | OXYGEN SATURATION: 94 % | WEIGHT: 204.8 LBS | HEIGHT: 66 IN

## 2022-10-20 PROCEDURE — 6370000000 HC RX 637 (ALT 250 FOR IP): Performed by: INTERNAL MEDICINE

## 2022-10-20 PROCEDURE — 2580000003 HC RX 258: Performed by: INTERNAL MEDICINE

## 2022-10-20 PROCEDURE — 71045 X-RAY EXAM CHEST 1 VIEW: CPT

## 2022-10-20 PROCEDURE — 93280 PM DEVICE PROGR EVAL DUAL: CPT | Performed by: INTERNAL MEDICINE

## 2022-10-20 PROCEDURE — 6360000002 HC RX W HCPCS: Performed by: INTERNAL MEDICINE

## 2022-10-20 RX ADMIN — PROPRANOLOL HYDROCHLORIDE 10 MG: 10 TABLET ORAL at 07:51

## 2022-10-20 RX ADMIN — HYDROCODONE BITARTRATE AND ACETAMINOPHEN 1 TABLET: 10; 325 TABLET ORAL at 07:51

## 2022-10-20 RX ADMIN — PANTOPRAZOLE SODIUM 40 MG: 40 TABLET, DELAYED RELEASE ORAL at 05:36

## 2022-10-20 RX ADMIN — HYDROCODONE BITARTRATE AND ACETAMINOPHEN 1 TABLET: 10; 325 TABLET ORAL at 02:24

## 2022-10-20 RX ADMIN — CEFAZOLIN 1000 MG: 1 INJECTION, POWDER, FOR SOLUTION INTRAMUSCULAR; INTRAVENOUS at 10:09

## 2022-10-20 RX ADMIN — LEVOTHYROXINE SODIUM 100 MCG: 0.1 TABLET ORAL at 05:36

## 2022-10-20 RX ADMIN — FLUOXETINE HYDROCHLORIDE 60 MG: 20 CAPSULE ORAL at 07:51

## 2022-10-20 RX ADMIN — FLECAINIDE ACETATE 50 MG: 50 TABLET ORAL at 07:52

## 2022-10-20 RX ADMIN — AMLODIPINE BESYLATE 2.5 MG: 5 TABLET ORAL at 07:52

## 2022-10-20 ASSESSMENT — PAIN SCALES - GENERAL
PAINLEVEL_OUTOF10: 0
PAINLEVEL_OUTOF10: 4

## 2022-10-20 NOTE — PROGRESS NOTES
TRANSFER - IN REPORT:    Verbal report received from Eloise, [facility] on Julia  being received from [address] [place] [address] for routine progression of care. Report consisted of patients Situation, Background, Assessment and Recommendations(SBAR). Information from the following report(s) SBAR, Kardex, Procedure Summary, Intake/Output, MAR, Recent Results, and Cardiac Rhythm A Paced  was reviewed. Opportunity for questions and clarification was provided. Assessment completed upon patients arrival to unit and care assumed. Patient received to room 330. Patient connected to monitor and assessment completed. Plan of care reviewed. Patient oriented to room and call light. Patient aware to use call light to communicate any chest pain or needs. Admission skin assessment completed with Cleo Lundy, second RN and reveals the following: Heels and sacrum are clean, dry, intact, and without redness. Pt has Lt subclavian s/p PPM, Lt lower chest site s/p loop removal. Both clean, Dry and intact.

## 2022-10-20 NOTE — PROGRESS NOTES
Discharge instructions reviewed with patient. Opportunity for questions provided. Patient voiced understanding of all discharge instructions. IVs and Cardiac monitor removed.

## 2022-10-20 NOTE — CARE COORDINATION
Patient admitted in OP status for PPI to treat symptomatic bradycardia and syncope. CM met with patient and her daughter this AM. Patient anticipates discharge this AM.  CXR completed. No discharge needs identified. Daughter here to transport patient home. 10/20/22 7635   Service Assessment   Patient Orientation Alert and Oriented   Cognition Alert   History Provided By Patient   Primary Caregiver Self   Accompanied By/Relationship Daughter   Support Systems Spouse/Significant Other;Children   Patient's Healthcare Decision Maker is: Legal Next of Kin   PCP Verified by CM Yes  Adore Cedeño)   Last Visit to PCP Within last 3 months   Prior Functional Level Independent in ADLs/IADLs   Current Functional Level Independent in ADLs/IADLs   Can patient return to prior living arrangement Yes   Ability to make needs known: Good   Family able to assist with home care needs: Yes   Would you like for me to discuss the discharge plan with any other family members/significant others, and if so, who? No   Financial Resources Medicare   Social/Functional History   Receives Help From Family   ADL Assistance Independent   Ambulation Assistance Independent   Transfer Assistance Independent   Mode of Transportation Car   Discharge Planning   Current Services Prior To Admission None   Potential Assistance Needed N/A   DME Ordered?  No   Potential Assistance Purchasing Medications No   Type of Home Care Services None   Services At/After Discharge   Mode of Transport at Discharge   (Daughter to transport home by car.)   Confirm Follow Up Transport Family

## 2022-10-20 NOTE — PLAN OF CARE
Problem: Discharge Planning  Goal: Discharge to home or other facility with appropriate resources  Outcome: Completed  Flowsheets (Taken 10/20/2022 0756)  Discharge to home or other facility with appropriate resources:   Identify barriers to discharge with patient and caregiver   Arrange for needed discharge resources and transportation as appropriate   Identify discharge learning needs (meds, wound care, etc)   Arrange for interpreters to assist at discharge as needed   Refer to discharge planning if patient needs post-hospital services based on physician order or complex needs related to functional status, cognitive ability or social support system     Problem: Safety - Adult  Goal: Free from fall injury  Outcome: Completed  Flowsheets (Taken 10/20/2022 0756)  Free From Fall Injury:   Instruct family/caregiver on patient safety   Based on caregiver fall risk screen, instruct family/caregiver to ask for assistance with transferring infant if caregiver noted to have fall risk factors     Problem: Pain  Goal: Verbalizes/displays adequate comfort level or baseline comfort level  Outcome: Completed

## 2022-10-20 NOTE — PROGRESS NOTES
[facility] CARDIOLOGY PROGRESS NOTE           10/20/2022 8:31 AM    Admit Date: 10/19/2022      Subjective:     Status post pacemaker placement from 10/19/2022 for symptomatic bradycardia; also prior history of syncope    ROS:  Cardiovascular:  As noted above    Objective:      Vitals:    10/20/22 0037 10/20/22 0427 10/20/22 0751 10/20/22 0918   BP: (!) 159/66 (!) 171/66 138/65 109/60   Pulse: 66 66 60 58   Resp: 18 18  18   Temp: 98.5 °F (36.9 °C) 98.7 °F (37.1 °C)  98.4 °F (36.9 °C)   TempSrc: Oral Oral  Oral   SpO2: 93% 90%  94%   Weight:  204 lb 12.8 oz (92.9 kg)     Height:           Physical Exam:  General-No Acute Distress  Neck- supple, no JVD  CV- regular rate and rhythm no MRG  Lung- clear bilaterally  Abd- soft, nontender, nondistended  Ext- no edema bilaterally. Skin- warm and dry    Data Review:   Recent Labs     10/19/22  0650      K 3.4*   BUN 16   WBC 4.9   HGB 9.8*   HCT 30.6*          Assessment/Plan:     Principal Problem:    Bradycardia  -Status post dual-chamber pacemaker placement. Prior history of recurrent syncope. -Echocardiogram with preserved EF. Paroxysmal atrial fibrillation  -Prior history; maintained on flecainide/Eliquis in the outpatient setting.  -Needs brenden agent with class Ic antiarrhythmic and appears on propranolol in the outpatient setting and resume on discharge.   Also resume Eliquis on discharge       Beny Aguilar MD  10/20/2022 8:31 AM

## 2022-10-20 NOTE — DISCHARGE SUMMARY
[facility] Discharge Summary     Patient ID:  Mat Quispe  [id number]  51 y.o.  1935    Admit date: 10/19/2022    Discharge date:  10/20/2022    Admitting Physician: Jason Becker MD     Discharge Physician: Dr. Danae Loco    Admission Diagnoses: Bradycardia [R00.1]    Discharge Diagnoses:   Patient Active Problem List    Diagnosis    Bradycardia    Syncope    Long term (current) use of anticoagulants    Paroxysmal atrial fibrillation ([address]    HTN (hypertension), benign       Cardiology Procedures this admission:   pacemaker implantation with loop removal CXR  and device interrogation  Consults: none    Hospital Course: Patient was seen at the office of [facility] by Dr. Basim Sal for management of symptomatic bradycardia and was subsequently scheduled for an AM Admission PM implantation at [facility] on 10/19/22. Patient was taken to the EP lab and underwent successful implantation of [facility] dual chamber pacemaker and loop removal by Dr. Basim Sal. Patient tolerated the procedure well and was taken to the telemetry floor for recovery. Follow up chest xray showed no pneumothorax. The following morning patient was up feeling well without any complaints of chest pain, shortness of breath, or palpitations. Patient's left subclavian cath site was clean, dry and intact without hematoma. Patient's labs were WNL. Patient was seen and examined by Dr. Danae Loco and determined stable and ready for discharge. Patient's device was interrogated prior to d/c showing normal function. Patient was instructed on the importance of medication compliance and outpatient follow up. Patient has been scheduled for follow-up with [facility] -- device clinic on 11/2/2022 at 10 am in [place] office. DISPOSITION: Patient has been instructed to keep affected arm below shoulder level for the next 4 weeks or until cleared by doctor. The arm sling should be worn while sleeping.   The dressing will be removed at follow-up. The incision site must be kept clean and dry. The patient may shower in a few days. Lotions, powders, or creams should be avoided as these can increase the risk of infection. The affected arm should not be used for any pushing, pulling, or lifting until cleared by doctor. Driving is prohibited until cleared by doctor in a follow up appointment. Any signs of infection including increased redness, suspicious drainage, or unexplained fever should be reported to the doctor immediately by calling [phone]. Discharge Exam:  Patient has been seen by Dr. Danae Loco: see his progress note for exam details.   BP (!) 129/57   Pulse 60   Temp 98.3 °F (36.8 °C) (Oral)   Resp 18   Ht 5' 6\" (1.676 m)   Wt 195 lb (88.5 kg)   SpO2 92%   BMI 31.47 kg/m²       Recent Results (from the past 24 hour(s))   Basic Metabolic Panel    Collection Time: 10/19/22  6:50 AM   Result Value Ref Range    Sodium 138 133 - 143 mmol/L    Potassium 3.4 (L) 3.5 - 5.1 mmol/L    Chloride 106 101 - 110 mmol/L    CO2 30 21 - 32 mmol/L    Anion Gap 2 2 - 11 mmol/L    Glucose 83 65 - 100 mg/dL    BUN 16 8 - 23 MG/DL    Creatinine 1.00 0.6 - 1.0 MG/DL    Est, Glom Filt Rate 55 (L) >60 ml/min/1.73m2    Calcium 9.5 8.3 - 10.4 MG/DL   CBC with Auto Differential    Collection Time: 10/19/22  6:50 AM   Result Value Ref Range    WBC 4.9 4.3 - 11.1 K/uL    RBC 3.08 (L) 4.05 - 5.2 M/uL    Hemoglobin 9.8 (L) 11.7 - 15.4 g/dL    Hematocrit 30.6 (L) 35.8 - 46.3 %    MCV 99.4 82 - 102 FL    MCH 31.8 26.1 - 32.9 PG    MCHC 32.0 31.4 - 35.0 g/dL    RDW 14.1 11.9 - 14.6 %    Platelets 488 781 - 445 K/uL    MPV 9.6 9.4 - 12.3 FL    nRBC 0.00 0.0 - 0.2 K/uL    Differential Type AUTOMATED      Seg Neutrophils 69 43 - 78 %    Lymphocytes 18 13 - 44 %    Monocytes 7 4.0 - 12.0 %    Eosinophils % 5 0.5 - 7.8 %    Basophils 1 0.0 - 2.0 %    Immature Granulocytes 0 0.0 - 5.0 %    Segs Absolute 3.4 1.7 - 8.2 K/UL    Absolute Lymph # 0.9 0.5 - 4.6 K/UL    Absolute Mono # 0.3 0.1 - 1.3 K/UL    Absolute Eos # 0.2 0.0 - 0.8 K/UL    Basophils Absolute 0.0 0.0 - 0.2 K/UL    Absolute Immature Granulocyte 0.0 0.0 - 0.5 K/UL   EKG 12 Lead    Collection Time: 10/19/22  7:02 AM   Result Value Ref Range    Ventricular Rate 50 BPM    Atrial Rate 50 BPM    P-R Interval 196 ms    QRS Duration 96 ms    Q-T Interval 470 ms    QTc Calculation (Bazett) 428 ms    P Axis 69 degrees    R Axis 7 degrees    T Axis -7 degrees    Diagnosis Sinus bradycardia    Electrophysiology procedure    Collection Time: 10/19/22 11:00 AM   Result Value Ref Range    Body Surface Area 2.03 m2         Patient Instructions:       Current Discharge Medication List        CONTINUE these medications which have NOT CHANGED    Details   amLODIPine (NORVASC) 2.5 MG tablet Take 2.5 mg by mouth daily      apixaban (ELIQUIS) 5 MG TABS tablet TAKE 1 TABLET BY MOUTH  TWICE DAILY hold 48 hours prior to pacemaker placement  Qty: 180 tablet, Refills: 3    Comments: Requesting 1 year supply      amitriptyline (ELAVIL) 25 MG tablet Take 25 mg by mouth nightly      diclofenac sodium (VOLTAREN) 1 % GEL Apply topically 4 times daily as needed      ferrous sulfate (IRON 325) 325 (65 Fe) MG tablet Take 325 mg by mouth daily      flecainide (TAMBOCOR) 50 MG tablet Take 50 mg by mouth 2 times daily TAKE 1 TABLET BY MOUTH TWO TIMES DAILY      !! FLUoxetine (PROZAC) 20 MG capsule Take 20 mg by mouth daily      !! FLUoxetine (PROZAC) 40 MG capsule Take 40 mg by mouth daily      HYDROcodone-acetaminophen (NORCO)  MG per tablet Take 1 tablet by mouth every 6 hours.       hydrOXYzine (ATARAX) 25 MG tablet Take 25 mg by mouth nightly as needed      levothyroxine (SYNTHROID) 50 MCG tablet Take by mouth every morning (before breakfast)      lisinopril (PRINIVIL;ZESTRIL) 20 MG tablet Take 20 mg by mouth daily      nitroGLYCERIN (NITROSTAT) 0.4 MG SL tablet Place 1 sl under the tongue q 5 min prn cp, max 3 sl in a 15-min time period. Call 911 if no relief after the 3rd sl. omeprazole (PRILOSEC) 40 MG delayed release capsule Take 40 mg by mouth daily      potassium chloride (MICRO-K) 10 MEQ extended release capsule Take 10 mEq by mouth at bedtime      propranolol (INDERAL) 10 MG tablet TAKE 1 TABLET BY MOUTH TWO TIMES DAILY      timolol (TIMOPTIC) 0.5 % ophthalmic solution Apply 1 drop to eye daily      triamterene-hydroCHLOROthiazide (MAXZIDE) 75-50 MG per tablet Take by mouth daily      Vitamin A 2400 MCG (8000 UT) CAPS Take 8,000 Units by mouth daily       ! ! - Potential duplicate medications found. Please discuss with provider. I have personally seen and examined patient and agree with above assessment. Please see my progress note for more details.     Celestine Tran MD  10/20/2022

## 2022-10-20 NOTE — DISCHARGE INSTRUCTIONS
Please resume home meds. Restart eliquis tonight's dose                                                                DEVICE IMPLANT FOLLOWUP INSTRUCTIONS  You will be discharged with an occlusive dressing over the incision site. This dressing will be removed at the 10 -14-day postop site check with the [address]. Your new device will be checked at that visit and all your device teaching will be given. Keep the incision site dry until your follow up. Use a hand-held shower or bath. Do not submerge or soak in pools or tubs for 6 weeks. If you are discharged with a prescription for antibiotics, please take the full prescription until it is gone. After your site check, you may shower and get the incision site wet. You may let soap and water run on the incision and pat dry. Please do not apply creams, lotions or powders on or near the incision. Mild bruising and swelling can be normal after implant and will resolve in a few weeks. Call the office at [phone] if you have any of the following:  -Signs of infection, such as fever over 100 F, drainage from the incision, redness, significant swelling, or warmth at the incision site.  -Significant pain around the site that gets worse. Mild discomfort can be normal.   -Bleeding from the incision site  -Swelling in the arm on the side of the incision site  -Chest pain or shortness of breath     Your device has the capability of transmitting device information from home to our office using a home monitoring system or phone ricky. The information will transmit through a cellular connection outside of your home. Your device data is reviewed during working hours to ensure proper device function. You will be given your equipment and instruction upon your hospital discharge.                                                                       -You will be given a sling to wear upon discharge with instructions when it should be worn.    -Do not lift the affected arm above the shoulder level, on the side the device was put in, for 4 weeks.   -Do not lift or push more than 5 to 10 pounds for 4 weeks on the affected side. Walking is fine and encouraged.   -Driving is restricted for 5-7 days. Long travel is not recommended until after your site check.    -Do not do any vigorous upper body activities, such as golfing, bowling tennis or mowing for about 6 weeks. -If you work, you may return to work. However, with limitations on lifting for 4 weeks.   -Please avoid any dental work or invasive procedures for 6 weeks, if possible, after implant. Please avoid strong magnetic fields, large power plant transformers and arc welders. Please stay 10 feet away of electromagnetic fields such as working over a large running engine, stereo speakers and large stereo systems. Home appliances are safe. You may operate any electrical or battery-operated device in your home. Modern Devices are seldom affected by normally operating home appliances, such as microwave ovens. Flying is safe and airport metal detectors will not affect your device, although your device may occasionally set off the metal detectors. If this happens, show the  your identification card. Security wanding over the device is contraindicated. Cellular Phones should be used with the hand opposite to the side where the device was implanted. The phone should not be carried in the pocket on the side of the device. CDL licenses are not renewed if you have a defibrillator implanted. Radiation Therapy should be avoided on or near the device. Should you require surgery in the future; some electrosurgical devices can interfere with the device function. You should discuss this with your surgeon prior to any operation. If you are ordered an MRI, Please contact the clinic prior to ensure you have an MRI safe device. You must wait 6 weeks after implant before you may have an MRI.    Tens units are contraindicated. Device Clinic [phone]     DISPOSITION: Patient has been instructed to keep affected arm below shoulder level for the next 4 weeks or until cleared by doctor. The arm sling should be worn while sleeping. The dressing will be removed at follow-up. The incision site must be kept clean and dry. The patient may shower in a few days. Lotions, powders, or creams should be avoided as these can increase the risk of infection. The affected arm should not be used for any pushing, pulling, or lifting until cleared by doctor. Driving is prohibited until cleared by doctor in a follow up appointment. Any signs of infection including increased redness, suspicious drainage, or unexplained fever should be reported to the doctor immediately by calling [phone].

## 2022-11-15 DIAGNOSIS — R55 SYNCOPE: Primary | ICD-10-CM

## 2022-11-15 DIAGNOSIS — R00.1 BRADYCARDIA: ICD-10-CM

## 2022-11-16 ENCOUNTER — HOSPITAL ENCOUNTER (EMERGENCY)
Age: 87
Discharge: HOME | End: 2022-11-17
Attending: EMERGENCY MEDICINE | Admitting: EMERGENCY MEDICINE
Payer: MEDICARE

## 2022-11-16 DIAGNOSIS — I48.91 ATRIAL FIBRILLATION WITH RAPID VENTRICULAR RESPONSE (HCC): Primary | ICD-10-CM

## 2022-11-16 DIAGNOSIS — D50.9 IRON DEFICIENCY ANEMIA, UNSPECIFIED IRON DEFICIENCY ANEMIA TYPE: ICD-10-CM

## 2022-11-16 PROCEDURE — 99285 EMERGENCY DEPT VISIT HI MDM: CPT

## 2022-11-16 PROCEDURE — 96374 THER/PROPH/DIAG INJ IV PUSH: CPT

## 2022-11-17 ENCOUNTER — APPOINTMENT (OUTPATIENT)
Dept: GENERAL RADIOLOGY | Age: 87
End: 2022-11-17
Payer: MEDICARE

## 2022-11-17 VITALS
HEIGHT: 67 IN | BODY MASS INDEX: 32.25 KG/M2 | RESPIRATION RATE: 28 BRPM | OXYGEN SATURATION: 94 % | HEART RATE: 70 BPM | SYSTOLIC BLOOD PRESSURE: 136 MMHG | DIASTOLIC BLOOD PRESSURE: 59 MMHG | WEIGHT: 205.47 LBS | TEMPERATURE: 97.7 F

## 2022-11-17 LAB
ANION GAP SERPL CALC-SCNC: 3 MMOL/L (ref 2–11)
APPEARANCE UR: NORMAL
BASOPHILS # BLD: 0 K/UL (ref 0–0.2)
BASOPHILS NFR BLD: 0 % (ref 0–2)
BILIRUB UR QL: NEGATIVE
BUN SERPL-MCNC: 19 MG/DL (ref 8–23)
CALCIUM SERPL-MCNC: 9.2 MG/DL (ref 8.3–10.4)
CHLORIDE SERPL-SCNC: 107 MMOL/L (ref 101–110)
CO2 SERPL-SCNC: 30 MMOL/L (ref 21–32)
COLOR UR: NORMAL
CREAT SERPL-MCNC: 0.9 MG/DL (ref 0.6–1)
DIFFERENTIAL METHOD BLD: ABNORMAL
EKG ATRIAL RATE: 0 BPM
EKG DIAGNOSIS: NORMAL
EKG P AXIS: 0 DEGREES
EKG P-R INTERVAL: 160 MS
EKG Q-T INTERVAL: 298 MS
EKG QRS DURATION: 97 MS
EKG QTC CALCULATION (BAZETT): 430 MS
EKG R AXIS: 14 DEGREES
EKG T AXIS: 267 DEGREES
EKG VENTRICULAR RATE: 125 BPM
EOSINOPHIL # BLD: 0.2 K/UL (ref 0–0.8)
EOSINOPHIL NFR BLD: 3 % (ref 0.5–7.8)
ERYTHROCYTE [DISTWIDTH] IN BLOOD BY AUTOMATED COUNT: 17.2 % (ref 11.9–14.6)
GLUCOSE SERPL-MCNC: 112 MG/DL (ref 65–100)
GLUCOSE UR STRIP.AUTO-MCNC: NEGATIVE MG/DL
HCT VFR BLD AUTO: 27.6 % (ref 35.8–46.3)
HGB BLD-MCNC: 8.6 G/DL (ref 11.7–15.4)
HGB UR QL STRIP: NEGATIVE
IMM GRANULOCYTES # BLD AUTO: 0.1 K/UL (ref 0–0.5)
IMM GRANULOCYTES NFR BLD AUTO: 1 % (ref 0–5)
KETONES UR QL STRIP.AUTO: NEGATIVE MG/DL
LEUKOCYTE ESTERASE UR QL STRIP.AUTO: NEGATIVE
LYMPHOCYTES # BLD: 1.6 K/UL (ref 0.5–4.6)
LYMPHOCYTES NFR BLD: 19 % (ref 13–44)
MAGNESIUM SERPL-MCNC: 1.7 MG/DL (ref 1.8–2.4)
MCH RBC QN AUTO: 29.5 PG (ref 26.1–32.9)
MCHC RBC AUTO-ENTMCNC: 31.2 G/DL (ref 31.4–35)
MCV RBC AUTO: 94.5 FL (ref 82–102)
MONOCYTES # BLD: 0.4 K/UL (ref 0.1–1.3)
MONOCYTES NFR BLD: 5 % (ref 4–12)
NEUTS SEG # BLD: 6.1 K/UL (ref 1.7–8.2)
NEUTS SEG NFR BLD: 72 % (ref 43–78)
NITRITE UR QL STRIP.AUTO: NEGATIVE
NRBC # BLD: 0 K/UL (ref 0–0.2)
PH UR STRIP: 6.5 [PH] (ref 5–9)
PLATELET # BLD AUTO: 312 K/UL (ref 150–450)
PMV BLD AUTO: 9.3 FL (ref 9.4–12.3)
POTASSIUM SERPL-SCNC: 3.5 MMOL/L (ref 3.5–5.1)
PROT UR STRIP-MCNC: NEGATIVE MG/DL
RBC # BLD AUTO: 2.92 M/UL (ref 4.05–5.2)
SODIUM SERPL-SCNC: 140 MMOL/L (ref 133–143)
SP GR UR REFRACTOMETRY: 1 (ref 1–1.02)
TROPONIN I SERPL HS-MCNC: 14.1 PG/ML (ref 0–14)
TROPONIN I SERPL HS-MCNC: 31.1 PG/ML (ref 0–14)
TSH W FREE THYROID IF ABNORMAL: 1.64 UIU/ML (ref 0.36–3.74)
UROBILINOGEN UR QL STRIP.AUTO: 0.2 EU/DL (ref 0.2–1)
WBC # BLD AUTO: 8.5 K/UL (ref 4.3–11.1)

## 2022-11-17 PROCEDURE — 81003 URINALYSIS AUTO W/O SCOPE: CPT

## 2022-11-17 PROCEDURE — 93005 ELECTROCARDIOGRAM TRACING: CPT | Performed by: EMERGENCY MEDICINE

## 2022-11-17 PROCEDURE — 83735 ASSAY OF MAGNESIUM: CPT

## 2022-11-17 PROCEDURE — 71045 X-RAY EXAM CHEST 1 VIEW: CPT

## 2022-11-17 PROCEDURE — 6370000000 HC RX 637 (ALT 250 FOR IP): Performed by: EMERGENCY MEDICINE

## 2022-11-17 PROCEDURE — 80048 BASIC METABOLIC PNL TOTAL CA: CPT

## 2022-11-17 PROCEDURE — 2580000003 HC RX 258: Performed by: EMERGENCY MEDICINE

## 2022-11-17 PROCEDURE — 84443 ASSAY THYROID STIM HORMONE: CPT

## 2022-11-17 PROCEDURE — 85025 COMPLETE CBC W/AUTO DIFF WBC: CPT

## 2022-11-17 PROCEDURE — 84484 ASSAY OF TROPONIN QUANT: CPT

## 2022-11-17 PROCEDURE — 2500000003 HC RX 250 WO HCPCS: Performed by: EMERGENCY MEDICINE

## 2022-11-17 RX ORDER — DILTIAZEM HYDROCHLORIDE 5 MG/ML
15 INJECTION INTRAVENOUS ONCE
Status: COMPLETED | OUTPATIENT
Start: 2022-11-17 | End: 2022-11-17

## 2022-11-17 RX ORDER — ASPIRIN 81 MG/1
324 TABLET, CHEWABLE ORAL ONCE
Status: COMPLETED | OUTPATIENT
Start: 2022-11-17 | End: 2022-11-17

## 2022-11-17 RX ORDER — 0.9 % SODIUM CHLORIDE 0.9 %
1000 INTRAVENOUS SOLUTION INTRAVENOUS
Status: COMPLETED | OUTPATIENT
Start: 2022-11-17 | End: 2022-11-17

## 2022-11-17 RX ADMIN — ASPIRIN 81 MG 324 MG: 81 TABLET ORAL at 00:21

## 2022-11-17 RX ADMIN — DILTIAZEM HYDROCHLORIDE 15 MG: 5 INJECTION INTRAVENOUS at 00:28

## 2022-11-17 RX ADMIN — SODIUM CHLORIDE 1000 ML: 9 INJECTION, SOLUTION INTRAVENOUS at 00:32

## 2022-11-17 ASSESSMENT — PAIN - FUNCTIONAL ASSESSMENT: PAIN_FUNCTIONAL_ASSESSMENT: NONE - DENIES PAIN

## 2022-11-17 NOTE — DISCHARGE INSTRUCTIONS
Go back to taking your propranolol as you were previously taking it. Wear the oxygen, including at night. Follow up as recommended above.   Return to your doctor or here sooner if you experience worsening or other worrisome symptoms

## 2022-11-17 NOTE — ED PROVIDER NOTES
Emergency Department Provider Note                   PCP:                Alberto Chino DO               Age: 82 y.o. Sex: female     Final diagnosis/impression:  1. Atrial fibrillation with rapid ventricular response ([address]    2. Iron deficiency anemia, unspecified iron deficiency anemia type         Disposition: Pending    MDM/Clinical Course:  Patient seen by myself here in the [address] [place] [address] emergency department. Patient had signs, symptoms and clinical history most consistent with palpitation symptoms, atrial fibrillation with rapid ventricular response. Labs and radiology ordered, with some anemia although elevated MCV, patient denies hematochezia or melena also do not suspect acute bleeding symptoms. While under my care, patient ordered and / or received IV fluids, p.o. aspirin, IV diltiazem. Heart rate has improved to below 100. Overall suspect recent medication change likely because of increased palpitations/tachycardia. Patient signed out to oncoming physician pending results of laboratory work-up as well as chest x-ray. Potential plan to send patient home if rates remain stable and work-up reassuring. In general discussion of risk associated with patient presentation, risk is assessed as high risk including use of high-risk medications IV diltiazem, and is not on high risk medications including Eliquis. Transition Care:  I assumed care at time of shift change, pending some lab results, reevaluation. Plan is to discharge if patient remains in a normal rhythm. Labs remarkable for a slowly trending down hemoglobin currently 8.6. Recommend follow-up with PMD and recheck in 1 week. Troponins mildly elevated 14 and 31 but this is be expected with runs of A. fib with RVR that she had. Chest x-ray appears to be improving compared to description of chest x-ray at [facility] last week where she was noted to have an atypical pneumonia bilaterally.   Appears mostly on the right side on today's chest x-ray mostly improved on the left. Currently denies fever, chills, cough. I discussed this all with the patient and her family at bedside. Will discharge home with recommended follow-up with cardiology, pulmonology, and her PCP. Clear return precautions discussed. HPI: Pablo Gibbs is a 82 y.o. female with past medical history of paroxysmal atrial fibrillation, hypertension presenting for evaluation of palpitations/tachycardia complaint. Patient reduced by half on propranolol yesterday, notes she woke this evening with sensation of tachycardia. Noted by EMS to be in A. fib with RVR rates of 120-200. Patient notes palpitation and mild dyspnea symptoms with such high rates. No chest pain symptoms, no near syncope, nausea, vomiting, diaphoresis symptoms. Patient reports some diarrheal earlier today. Patient given 10 mg IV diltiazem with some improvement in rates into the 90s, back into A. fib with rate around 120 shortly after arrival here. Denies any recent history of fall or trauma, denies any hematochezia or melena. Patient/family denies any other evaluation for today's acute complaint. Patient/family denies any other aggravating or alleviating factors. Patient/family denies any other symptoms. ROS:   All review of systems negative except as noted above in the history of the present illness.     Past Medical/ Family/ Social History:     Medical history:   Past Medical History:   Diagnosis Date    Chest pain 10/30/2015    Dyspnea     GERD (gastroesophageal reflux disease)     Headache     HTN (hypertension), benign 10/30/2015    Malaise and fatigue     Paroxysmal atrial fibrillation ([address] 10/30/2015    Preoperative cardiovascular examination     Psychiatric disorder     PUD (peptic ulcer disease)     Thromboembolus ([address]     leg    Thyroid disease        Surgical history:   Past Surgical History:   Procedure Laterality Date    BREAST REDUCTION SURGERY      EP DEVICE PROCEDURE N/A 10/19/2022    INSERT PPM DUAL performed by Speedy Mao MD at [address] [place] [address] LAB    EP DEVICE PROCEDURE N/A 10/19/2022    Loop recorder removal performed by Speedy Mao MD at [address] [place] [address] LAB    EYE SURGERY      HYSTERECTOMY (CERVIX STATUS UNKNOWN)      LAP,CHOLECYSTECTOMY      IN APPENDECTOMY         Family history:   Family History   Problem Relation Age of Onset    Diabetes Brother     Cancer Father         liver    Cancer Brother         prostate    Heart Attack Brother     Cancer Mother         non hog    Diabetes Mother        Social history:   Social History     Socioeconomic History    Marital status:      Spouse name: None    Number of children: None    Years of education: None    Highest education level: None   Tobacco Use    Smoking status: Former     Packs/day: 1.00     Types: Cigarettes    Smokeless tobacco: Former   Substance and Sexual Activity    Alcohol use: No        Medications:   Previous Medications    AMITRIPTYLINE (ELAVIL) 25 MG TABLET    Take 25 mg by mouth nightly    AMLODIPINE (NORVASC) 2.5 MG TABLET    Take 2.5 mg by mouth daily    APIXABAN (ELIQUIS) 5 MG TABS TABLET    TAKE 1 TABLET BY MOUTH  TWICE DAILY hold 48 hours prior to pacemaker placement    DICLOFENAC SODIUM (VOLTAREN) 1 % GEL    Apply topically 4 times daily as needed    FERROUS SULFATE (IRON 325) 325 (65 FE) MG TABLET    Take 325 mg by mouth daily    FLECAINIDE (TAMBOCOR) 50 MG TABLET    Take 50 mg by mouth 2 times daily TAKE 1 TABLET BY MOUTH TWO TIMES DAILY    FLUOXETINE (PROZAC) 20 MG CAPSULE    Take 20 mg by mouth daily    FLUOXETINE (PROZAC) 40 MG CAPSULE    Take 40 mg by mouth daily    HYDROCODONE-ACETAMINOPHEN (NORCO)  MG PER TABLET    Take 1 tablet by mouth every 6 hours.     HYDROXYZINE (ATARAX) 25 MG TABLET    Take 25 mg by mouth nightly as needed    LEVOTHYROXINE (SYNTHROID) 50 MCG TABLET    Take by mouth every morning (before breakfast)    LISINOPRIL (PRINIVIL;ZESTRIL) 20 MG TABLET    Take 20 mg by mouth daily    NITROGLYCERIN (NITROSTAT) 0.4 MG SL TABLET    Place 1 sl under the tongue q 5 min prn cp, max 3 sl in a 15-min time period. Call 911 if no relief after the 3rd sl. OMEPRAZOLE (PRILOSEC) 40 MG DELAYED RELEASE CAPSULE    Take 40 mg by mouth daily    POTASSIUM CHLORIDE (MICRO-K) 10 MEQ EXTENDED RELEASE CAPSULE    Take 10 mEq by mouth at bedtime    PROPRANOLOL (INDERAL) 10 MG TABLET    TAKE 1 TABLET BY MOUTH TWO TIMES DAILY    TIMOLOL (TIMOPTIC) 0.5 % OPHTHALMIC SOLUTION    Apply 1 drop to eye daily    TRIAMTERENE-HYDROCHLOROTHIAZIDE (MAXZIDE) 75-50 MG PER TABLET    Take by mouth daily    VITAMIN A 2400 MCG (8000 UT) CAPS    Take 8,000 Units by mouth daily        Allergies: Allergies   Allergen Reactions    Aloe Vera Other (See Comments)    Metoclopramide Other (See Comments)    Morphine Other (See Comments)    Penicillins Other (See Comments)    Sulfa Antibiotics Other (See Comments)    Sulfamethoxazole-Trimethoprim Other (See Comments)    Adhesive Tape Rash         Physical Exam     Vitals signs reviewed.    Patient Vitals for the past 4 hrs:   Pulse Resp BP SpO2   11/17/22 0415 70 19 (!) 162/70 96 %   11/17/22 0315 67 17 134/60 91 %   11/17/22 0245 66 16 (!) 118/59 94 %   11/17/22 0145 99 22 130/86 94 %   11/17/22 0115 (!) 104 19 114/65 92 %   11/17/22 0100 98 19 110/71 94 %       General: Alert and oriented ×4, no acute distress   Eyes: Anicteric, conjunctiva pink, PERRLA, EOMI  ENT: No nasal discharge, no gross nasal congestion present  Pulmonary: Clear to auscultation bilaterally with symmetric chest rise, no increased work of breathing, no accessory muscle use  Cardiovascular: Tachycardia, regular  GI: Abdomen is soft, nontender, nondistended, bowel sounds are present  Musculoskeletal: No obvious joint deformity or joint effusion, normal joint range of motion  Neuro: Cranial nerves II through VII grossly intact, strength and sensation is grossly intact in the upper and lower extremities bilaterally  Skin: Skin is warm and dry, no rash or lesions    Procedures    ED EKG Interpretation  EKG was interpreted in the absence of a cardiologist.  Interpretation:  EKG read on 11/17/2022 at 00 15  Atrial fibrillation with rapid ventricular response, rate of 125, with no evidence of ST elevation MI, LVH present with associated repolarization abnormality, qtc is 430 and is not prolonged, qrs is 97 and is not widened. Results for orders placed or performed during the hospital encounter of 11/16/22   XR CHEST PORTABLE    Narrative    EXAM: XR CHEST PORTABLE    HISTORY: A. fib with RVR, dyspnea. TECHNIQUE: Frontal chest.    COMPARISON: 10/20/2022     FINDINGS:   The patient is quite lordotic and there is a shallow inspiration. The cardiac silhouette, mediastinum, are within normal limits. There are bilateral infiltrates observed at which may be due to pulmonary  vascular congestion or pneumonia. This is best appreciated inferiorly on the  right. Mild bibasilar atelectasis. There is no pleural effusion, or pneumothorax. No significant osseous abnormalities are observed. Impression    Findings as above.      CBC with Auto Differential   Result Value Ref Range    WBC 8.5 4.3 - 11.1 K/uL    RBC 2.92 (L) 4.05 - 5.2 M/uL    Hemoglobin 8.6 (L) 11.7 - 15.4 g/dL    Hematocrit 27.6 (L) 35.8 - 46.3 %    MCV 94.5 82 - 102 FL    MCH 29.5 26.1 - 32.9 PG    MCHC 31.2 (L) 31.4 - 35.0 g/dL    RDW 17.2 (H) 11.9 - 14.6 %    Platelets 894 287 - 689 K/uL    MPV 9.3 (L) 9.4 - 12.3 FL    nRBC 0.00 0.0 - 0.2 K/uL    Differential Type AUTOMATED      Seg Neutrophils 72 43 - 78 %    Lymphocytes 19 13 - 44 %    Monocytes 5 4.0 - 12.0 %    Eosinophils % 3 0.5 - 7.8 %    Basophils 0 0.0 - 2.0 %    Immature Granulocytes 1 0.0 - 5.0 %    Segs Absolute 6.1 1.7 - 8.2 K/UL    Absolute Lymph # 1.6 0.5 - 4.6 K/UL    Absolute Mono # 0.4 0.1 - 1.3 K/UL    Absolute Eos # 0.2 0.0 - 0.8 K/UL    Basophils Absolute 0.0 0.0 - 0.2 K/UL    Absolute Immature Granulocyte 0.1 0.0 - 0.5 K/UL   BMP   Result Value Ref Range    Sodium 140 133 - 143 mmol/L    Potassium 3.5 3.5 - 5.1 mmol/L    Chloride 107 101 - 110 mmol/L    CO2 30 21 - 32 mmol/L    Anion Gap 3 2 - 11 mmol/L    Glucose 112 (H) 65 - 100 mg/dL    BUN 19 8 - 23 MG/DL    Creatinine 0.90 0.6 - 1.0 MG/DL    Est, Glom Filt Rate >60 >60 ml/min/1.73m2    Calcium 9.2 8.3 - 10.4 MG/DL   Magnesium   Result Value Ref Range    Magnesium 1.7 (L) 1.8 - 2.4 mg/dL   Urinalysis   Result Value Ref Range    Color, UA YELLOW/STRAW      Appearance CLOUDY      Specific [place], UA 1.005 1.001 - 1.023      pH, Urine 6.5 5.0 - 9.0      Protein, UA Negative NEG mg/dL    Glucose, UA Negative mg/dL    Ketones, Urine Negative NEG mg/dL    Bilirubin Urine Negative NEG      Blood, Urine Negative NEG      Urobilinogen, Urine 0.2 0.2 - 1.0 EU/dL    Nitrite, Urine Negative NEG      Leukocyte Esterase, Urine Negative NEG     Troponin   Result Value Ref Range    Troponin, High Sensitivity 14.1 (H) 0 - 14 pg/mL   Troponin   Result Value Ref Range    Troponin, High Sensitivity 31.1 (H) 0 - 14 pg/mL   TSH with Reflex   Result Value Ref Range    TSH w Free Thyroid if Abnormal 1.64 0.358 - 3.740 UIU/ML        XR CHEST PORTABLE   Final Result   Findings as above. Voice dictation software was used during the making of this note. This software is not perfect and grammatical and other typographical errors may be present. This note has not been completely proofread for errors.      Sruthi Soto MD  11/17/22 [id number] MD Caleb  11/17/22 6948

## 2022-11-17 NOTE — ED NOTES
Reviewed discharge instructions with patient and family, including the importance of wearing her oxygen, including at night and to revert to her normal dose of propanolol. Opportunity given for questions and clarifications. Patient verbalizes understanding. Patient to be transported home with Karl Shrestha r/t needs for constant oxygen therapy. Karl Shrestha contacted by  at this time.      Nessa Harrell RN  11/17/22 0508

## 2022-11-17 NOTE — ED TRIAGE NOTES
EMS: Patient arriving from home via [address]. Pt has hx of Afib, propanolol reduced yesterday. Woke up tonight with palpitations and not feeling well. Found in Afib -200. 20mg cardizem given, HR down to  still Afib.  130/80, 20gLAC, 94% on home 2L NC, 300NS given.      Dr Marcelo Ling present during triage

## 2022-11-17 NOTE — ED NOTES
[facility] RN, Samantha Dong, contacted at the number provided earlier in the record at the patient's request to ensure a home visit is not done this morning, as patient is wanting to sleep.      Doris Qureshi RN  11/17/22 2886

## 2022-11-17 NOTE — ED NOTES
Arranged discharge transportation via [facility]. ETA at this time [id number].       Varinder LION  11/17/22 0524

## 2022-11-17 NOTE — ED NOTES
[facility] RN, Adriana Buckley, called the department requesting an update. She would like an update when a decision is made to either admit or discharge the patient. She can be contacted at [phone].    Duran Overton RN  11/17/22 Lee Ware, RYAN  11/17/22 0184

## 2022-11-17 NOTE — ED NOTES
Pt ambulated at bedside. HR stayed Normal sinus and between 71 and 80 bpm. Pt sts she feels fine and feels comfortable going home. Pt questioned if she she go back to her regular dosing of propanolol. RN to check with Ta Santo MD regarding pt's concern.      Cliff Eng RN  11/17/22 0275

## 2022-11-21 ENCOUNTER — OFFICE VISIT (OUTPATIENT)
Dept: CARDIOLOGY CLINIC | Age: 87
End: 2022-11-21
Payer: MEDICARE

## 2022-11-21 VITALS
BODY MASS INDEX: 30.61 KG/M2 | WEIGHT: 195 LBS | HEART RATE: 64 BPM | DIASTOLIC BLOOD PRESSURE: 66 MMHG | HEIGHT: 67 IN | OXYGEN SATURATION: 92 % | SYSTOLIC BLOOD PRESSURE: 126 MMHG

## 2022-11-21 DIAGNOSIS — R00.1 BRADYCARDIA: ICD-10-CM

## 2022-11-21 DIAGNOSIS — I10 HTN (HYPERTENSION), BENIGN: ICD-10-CM

## 2022-11-21 DIAGNOSIS — I48.0 PAROXYSMAL ATRIAL FIBRILLATION (HCC): Primary | ICD-10-CM

## 2022-11-21 DIAGNOSIS — Z95.0 PACEMAKER: ICD-10-CM

## 2022-11-21 DIAGNOSIS — Z79.01 LONG TERM (CURRENT) USE OF ANTICOAGULANTS: ICD-10-CM

## 2022-11-21 PROCEDURE — 3078F DIAST BP <80 MM HG: CPT | Performed by: INTERNAL MEDICINE

## 2022-11-21 PROCEDURE — G8484 FLU IMMUNIZE NO ADMIN: HCPCS | Performed by: INTERNAL MEDICINE

## 2022-11-21 PROCEDURE — 1123F ACP DISCUSS/DSCN MKR DOCD: CPT | Performed by: INTERNAL MEDICINE

## 2022-11-21 PROCEDURE — 1036F TOBACCO NON-USER: CPT | Performed by: INTERNAL MEDICINE

## 2022-11-21 PROCEDURE — G8428 CUR MEDS NOT DOCUMENT: HCPCS | Performed by: INTERNAL MEDICINE

## 2022-11-21 PROCEDURE — G8417 CALC BMI ABV UP PARAM F/U: HCPCS | Performed by: INTERNAL MEDICINE

## 2022-11-21 PROCEDURE — G8400 PT W/DXA NO RESULTS DOC: HCPCS | Performed by: INTERNAL MEDICINE

## 2022-11-21 PROCEDURE — 93000 ELECTROCARDIOGRAM COMPLETE: CPT | Performed by: INTERNAL MEDICINE

## 2022-11-21 PROCEDURE — 3074F SYST BP LT 130 MM HG: CPT | Performed by: INTERNAL MEDICINE

## 2022-11-21 PROCEDURE — 99214 OFFICE O/P EST MOD 30 MIN: CPT | Performed by: INTERNAL MEDICINE

## 2022-11-21 PROCEDURE — 1090F PRES/ABSN URINE INCON ASSESS: CPT | Performed by: INTERNAL MEDICINE

## 2022-11-21 RX ORDER — AMOXICILLIN 250 MG
1 CAPSULE ORAL NIGHTLY
COMMUNITY

## 2022-11-21 RX ORDER — METOPROLOL SUCCINATE 50 MG/1
50 TABLET, EXTENDED RELEASE ORAL DAILY
Qty: 30 TABLET | Refills: 3 | Status: SHIPPED | OUTPATIENT
Start: 2022-11-21

## 2022-11-21 RX ORDER — LEVALBUTEROL INHALATION SOLUTION 1.25 MG/3ML
1 SOLUTION RESPIRATORY (INHALATION) EVERY 8 HOURS PRN
COMMUNITY

## 2022-11-21 RX ORDER — TRIAMTERENE AND HYDROCHLOROTHIAZIDE 75; 50 MG/1; MG/1
1 TABLET ORAL DAILY
Qty: 30 TABLET | Refills: 3 | Status: SHIPPED | OUTPATIENT
Start: 2022-11-21

## 2022-11-21 RX ORDER — LANOLIN ALCOHOL/MO/W.PET/CERES
1000 CREAM (GRAM) TOPICAL DAILY
COMMUNITY

## 2022-11-21 RX ORDER — FLECAINIDE ACETATE 50 MG/1
75 TABLET ORAL 2 TIMES DAILY
Qty: 60 TABLET | Refills: 3 | Status: SHIPPED | OUTPATIENT
Start: 2022-11-21 | End: 2022-11-23 | Stop reason: SDUPTHER

## 2022-11-21 RX ORDER — LORATADINE 10 MG/1
10 TABLET ORAL DAILY
COMMUNITY

## 2022-11-21 RX ORDER — ACETAMINOPHEN 325 MG/1
650 TABLET ORAL EVERY 6 HOURS PRN
COMMUNITY

## 2022-11-21 ASSESSMENT — ENCOUNTER SYMPTOMS
SHORTNESS OF BREATH: 1
ABDOMINAL PAIN: 0

## 2022-11-21 NOTE — TELEPHONE ENCOUNTER
[facility] called with a couple of questions regarding the pt's medications:    1) The pt's Tambocor has two different sets of directions. Which set of directions should they be following? 2) The pt's Maxzide was rejected by their insurance due to a potential drug interaction with potassium. What should they do about this?

## 2022-11-21 NOTE — PROGRESS NOTES
[address], [address] [facility], [address] [place] [address]  PHONE: [phone]    Fernando Russell  1935      SUBJECTIVE:   Fernando Russell is a 82 y.o. female seen for a follow up visit regarding the following:     Chief Complaint   Patient presents with    Follow-Up from Hospital     Afib        HPI:    81-year-old female worked in today for evaluation of some recurrent atrial fibrillation. She is seen Dr. Rhonda Worley in the past.  She had a loop recorder finally that picked up some pauses with a put a pacemaker in her for that. She has been maintained on flecainide 50 twice a day for a number of years. She takes just a low-dose beta-blocker Inderal.  She has been recently having more bouts of atrial fibrillation to the ER with A. dale with RVR. She converted back to sinus rhythm and they let her go home. They did not give her any other more medications in the ER. She sometimes had a little mild swelling but somewhere her medicines include blood pressure and diuretic have been stopped. She has follow-up of some respiratory illness. She has had a little mild swelling. Past Medical History, Past Surgical History, Family history, Social History, and Medications were all reviewed with the patient today and updated as necessary.        Allergies   Allergen Reactions    Aloe Vera Other (See Comments)    Metoclopramide Other (See Comments)    Morphine Other (See Comments)    Penicillins Other (See Comments)    Sulfa Antibiotics Other (See Comments)    Sulfamethoxazole-Trimethoprim Other (See Comments)    Adhesive Tape Rash     Past Medical History:   Diagnosis Date    Chest pain 10/30/2015    Dyspnea     GERD (gastroesophageal reflux disease)     Headache     HTN (hypertension), benign 10/30/2015    Malaise and fatigue     Paroxysmal atrial fibrillation ([address] 10/30/2015    Preoperative cardiovascular examination     Psychiatric disorder     PUD (peptic ulcer disease)     Thromboembolus ([address]     leg and test results were reviewed with the patient today. Results for orders placed or performed in visit on 11/21/22   EKG 12 Lead    Impression    Atrially paced rhythm around 60 PVCs noted. Normal QRS conduction. No significant prolonged intervals minor nonspecific ST-T wave change     Lab Results   Component Value Date/Time     11/17/2022 12:26 AM    K 3.5 11/17/2022 12:26 AM     11/17/2022 12:26 AM    CO2 30 11/17/2022 12:26 AM    BUN 19 11/17/2022 12:26 AM    GFRAA >60 08/16/2022 09:54 AM   Echo in August showed normal systolic function EF 46-42 mild increased thickness  Recent chest x-ray suggested mild bibasilar atelectasis  EKG 1117 ER showed atrial fibs rapid rate 125 130 range LVH pattern. Nonspecific ST-T wave change. ASSESSMENT and PLAN    Sandhya Green was seen today for follow-up from hospital.    Diagnoses and all orders for this visit:    Paroxysmal atrial fibrillation ([address] patient's had recurrent atrial fibrillation. Her pacing device is working normally its during the episode with no pacing spikes and now she is back to atrially pacing and explained that to the family. Atrial fibs certainly can continue to occur more frequently she gets older she has had this problem for some time now. We will increase her flecainide to 75 twice a day change her beta-blocker out to Toprol to slow her down versus propanolol. I will have her see EP and not sure if he be a candidate for ablation versus changing drugs is a possibility.   Not sure that I want her on long-term flecainide which has been ordered by previous partners a long time  -     EKG 12 Lead  -     [facility] (Electrophysiology)[facility]    HTN (hypertension), benign I will resume her Maxide at this time for mild swelling of bradycardia hypertension  -     EKG 12 Lead    Bradycardia she is now pacing and stay    Long term (current) use of anticoagulants should continue blood thinner at this time    Pacemaker    Other orders  -     flecainide (TAMBOCOR) 50 MG tablet; Take 1.5 tablets by mouth 2 times daily TAKE 1 TABLET BY MOUTH TWO TIMES DAILY  -     metoprolol succinate (TOPROL XL) 50 MG extended release tablet; Take 1 tablet by mouth daily  -     triamterene-hydroCHLOROthiazide (MAXZIDE) 75-50 MG per tablet; Take 1 tablet by mouth daily  Patient will see EP and follow-up with Dr. Selene Nieto      No follow-up provider specified.     Destiny Bañuelos MD  11/21/2022  5:11 PM

## 2022-11-22 NOTE — TELEPHONE ENCOUNTER
Spoke with pharmacist at [facility]. Clarified Flecainide dose and directions. Rx sent to  for signature.

## 2022-11-23 RX ORDER — FLECAINIDE ACETATE 50 MG/1
75 TABLET ORAL 2 TIMES DAILY
Qty: 270 TABLET | Refills: 3 | Status: SHIPPED | OUTPATIENT
Start: 2022-11-23

## 2022-12-03 ENCOUNTER — APPOINTMENT (OUTPATIENT)
Dept: GENERAL RADIOLOGY | Age: 87
End: 2022-12-03
Payer: MEDICARE

## 2022-12-03 ENCOUNTER — HOSPITAL ENCOUNTER (INPATIENT)
Age: 87
LOS: 6 days | Discharge: NURSING, REHAB OR LONG-TERM CARE | End: 2022-12-09
Attending: EMERGENCY MEDICINE | Admitting: FAMILY MEDICINE
Payer: MEDICARE

## 2022-12-03 DIAGNOSIS — M79.89 LEFT ARM SWELLING: ICD-10-CM

## 2022-12-03 DIAGNOSIS — G89.29 OTHER CHRONIC PAIN: ICD-10-CM

## 2022-12-03 DIAGNOSIS — I50.9 ACUTE CONGESTIVE HEART FAILURE, UNSPECIFIED HEART FAILURE TYPE (HCC): Primary | ICD-10-CM

## 2022-12-03 PROBLEM — D64.9 ANEMIA, UNSPECIFIED: Status: ACTIVE | Noted: 2022-01-01

## 2022-12-03 PROBLEM — Z79.01 LONG TERM (CURRENT) USE OF ANTICOAGULANTS: Status: ACTIVE | Noted: 2018-05-01

## 2022-12-03 PROBLEM — J96.00 ACUTE RESPIRATORY FAILURE (HCC): Status: ACTIVE | Noted: 2022-12-03

## 2022-12-03 PROBLEM — E87.6 HYPOKALEMIA: Status: ACTIVE | Noted: 2022-01-01

## 2022-12-03 PROBLEM — Z79.01 ANTICOAGULANT LONG-TERM USE: Status: ACTIVE | Noted: 2017-08-09

## 2022-12-03 LAB
ALBUMIN SERPL-MCNC: 2.7 G/DL (ref 3.2–4.6)
ALBUMIN/GLOB SERPL: 0.8 {RATIO} (ref 0.4–1.6)
ALP SERPL-CCNC: 128 U/L (ref 50–136)
ALT SERPL-CCNC: 29 U/L (ref 12–65)
ANION GAP SERPL CALC-SCNC: 5 MMOL/L (ref 2–11)
ARTERIAL PATENCY WRIST A: POSITIVE
AST SERPL-CCNC: 40 U/L (ref 15–37)
BASE EXCESS BLD CALC-SCNC: 3.7 MMOL/L
BASOPHILS # BLD: 0 K/UL (ref 0–0.2)
BASOPHILS NFR BLD: 0 % (ref 0–2)
BDY SITE: ABNORMAL
BILIRUB SERPL-MCNC: 1 MG/DL (ref 0.2–1.1)
BUN SERPL-MCNC: 20 MG/DL (ref 8–23)
CALCIUM SERPL-MCNC: 9.3 MG/DL (ref 8.3–10.4)
CHLORIDE SERPL-SCNC: 103 MMOL/L (ref 101–110)
CO2 SERPL-SCNC: 31 MMOL/L (ref 21–32)
CREAT SERPL-MCNC: 0.8 MG/DL (ref 0.6–1)
DIFFERENTIAL METHOD BLD: ABNORMAL
EKG ATRIAL RATE: 73 BPM
EKG DIAGNOSIS: NORMAL
EKG P-R INTERVAL: 204 MS
EKG Q-T INTERVAL: 409 MS
EKG QRS DURATION: 94 MS
EKG QTC CALCULATION (BAZETT): 448 MS
EKG R AXIS: 27 DEGREES
EKG T AXIS: 13 DEGREES
EKG VENTRICULAR RATE: 72 BPM
EOSINOPHIL # BLD: 0.6 K/UL (ref 0–0.8)
EOSINOPHIL NFR BLD: 6 % (ref 0.5–7.8)
ERYTHROCYTE [DISTWIDTH] IN BLOOD BY AUTOMATED COUNT: 17.8 % (ref 11.9–14.6)
GAS FLOW.O2 O2 DELIVERY SYS: ABNORMAL L/MIN
GLOBULIN SER CALC-MCNC: 3.6 G/DL (ref 2.8–4.5)
GLUCOSE SERPL-MCNC: 128 MG/DL (ref 65–100)
HCO3 BLD-SCNC: 27.8 MMOL/L (ref 22–26)
HCT VFR BLD AUTO: 26.6 % (ref 35.8–46.3)
HGB BLD-MCNC: 8.1 G/DL (ref 11.7–15.4)
IMM GRANULOCYTES # BLD AUTO: 0.1 K/UL (ref 0–0.5)
IMM GRANULOCYTES NFR BLD AUTO: 1 % (ref 0–5)
IRON SATN MFR SERPL: 10 %
IRON SERPL-MCNC: 20 UG/DL (ref 35–150)
LACTATE SERPL-SCNC: 1.4 MMOL/L (ref 0.4–2)
LYMPHOCYTES # BLD: 0.9 K/UL (ref 0.5–4.6)
LYMPHOCYTES NFR BLD: 10 % (ref 13–44)
MAGNESIUM SERPL-MCNC: 2 MG/DL (ref 1.8–2.4)
MCH RBC QN AUTO: 28.5 PG (ref 26.1–32.9)
MCHC RBC AUTO-ENTMCNC: 30.5 G/DL (ref 31.4–35)
MCV RBC AUTO: 93.7 FL (ref 82–102)
MONOCYTES # BLD: 0.6 K/UL (ref 0.1–1.3)
MONOCYTES NFR BLD: 6 % (ref 4–12)
NEUTS SEG # BLD: 7.6 K/UL (ref 1.7–8.2)
NEUTS SEG NFR BLD: 77 % (ref 43–78)
NRBC # BLD: 0 K/UL (ref 0–0.2)
NT PRO BNP: 2517 PG/ML
O2/TOTAL GAS SETTING VFR VENT: 100 %
PCO2 BLD: 39.5 MMHG (ref 35–45)
PH BLD: 7.46 [PH] (ref 7.35–7.45)
PLATELET # BLD AUTO: 261 K/UL (ref 150–450)
PMV BLD AUTO: 9.5 FL (ref 9.4–12.3)
PO2 BLD: 126 MMHG (ref 75–100)
POTASSIUM SERPL-SCNC: 3.2 MMOL/L (ref 3.5–5.1)
PROCALCITONIN SERPL-MCNC: 0.11 NG/ML (ref 0–0.49)
PROT SERPL-MCNC: 6.3 G/DL (ref 6.3–8.2)
RBC # BLD AUTO: 2.84 M/UL (ref 4.05–5.2)
SAO2 % BLD: 99 % (ref 95–98)
SARS-COV-2 RDRP RESP QL NAA+PROBE: NOT DETECTED
SERVICE CMNT-IMP: ABNORMAL
SODIUM SERPL-SCNC: 139 MMOL/L (ref 133–143)
SOURCE: NORMAL
SPECIMEN TYPE: ABNORMAL
TIBC SERPL-MCNC: 206 UG/DL (ref 250–450)
TROPONIN I SERPL HS-MCNC: 14.6 PG/ML (ref 0–14)
TROPONIN I SERPL HS-MCNC: 16.3 PG/ML (ref 0–14)
WBC # BLD AUTO: 9.7 K/UL (ref 4.3–11.1)

## 2022-12-03 PROCEDURE — 2500000003 HC RX 250 WO HCPCS: Performed by: FAMILY MEDICINE

## 2022-12-03 PROCEDURE — 6360000002 HC RX W HCPCS: Performed by: FAMILY MEDICINE

## 2022-12-03 PROCEDURE — 83550 IRON BINDING TEST: CPT

## 2022-12-03 PROCEDURE — 71045 X-RAY EXAM CHEST 1 VIEW: CPT

## 2022-12-03 PROCEDURE — 83880 ASSAY OF NATRIURETIC PEPTIDE: CPT

## 2022-12-03 PROCEDURE — 36600 WITHDRAWAL OF ARTERIAL BLOOD: CPT

## 2022-12-03 PROCEDURE — 85025 COMPLETE CBC W/AUTO DIFF WBC: CPT

## 2022-12-03 PROCEDURE — 6370000000 HC RX 637 (ALT 250 FOR IP): Performed by: FAMILY MEDICINE

## 2022-12-03 PROCEDURE — 80053 COMPREHEN METABOLIC PANEL: CPT

## 2022-12-03 PROCEDURE — 87635 SARS-COV-2 COVID-19 AMP PRB: CPT

## 2022-12-03 PROCEDURE — 5A09357 ASSISTANCE WITH RESPIRATORY VENTILATION, LESS THAN 24 CONSECUTIVE HOURS, CONTINUOUS POSITIVE AIRWAY PRESSURE: ICD-10-PCS | Performed by: INTERNAL MEDICINE

## 2022-12-03 PROCEDURE — 96374 THER/PROPH/DIAG INJ IV PUSH: CPT

## 2022-12-03 PROCEDURE — 83605 ASSAY OF LACTIC ACID: CPT

## 2022-12-03 PROCEDURE — 84145 PROCALCITONIN (PCT): CPT

## 2022-12-03 PROCEDURE — 93005 ELECTROCARDIOGRAM TRACING: CPT | Performed by: EMERGENCY MEDICINE

## 2022-12-03 PROCEDURE — 99285 EMERGENCY DEPT VISIT HI MDM: CPT

## 2022-12-03 PROCEDURE — 6360000002 HC RX W HCPCS: Performed by: EMERGENCY MEDICINE

## 2022-12-03 PROCEDURE — 99223 1ST HOSP IP/OBS HIGH 75: CPT | Performed by: INTERNAL MEDICINE

## 2022-12-03 PROCEDURE — 84484 ASSAY OF TROPONIN QUANT: CPT

## 2022-12-03 PROCEDURE — 82803 BLOOD GASES ANY COMBINATION: CPT

## 2022-12-03 PROCEDURE — 83735 ASSAY OF MAGNESIUM: CPT

## 2022-12-03 PROCEDURE — 2000000000 HC ICU R&B

## 2022-12-03 PROCEDURE — 94660 CPAP INITIATION&MGMT: CPT

## 2022-12-03 RX ORDER — ACETAMINOPHEN 325 MG/1
650 TABLET ORAL EVERY 6 HOURS PRN
Status: DISCONTINUED | OUTPATIENT
Start: 2022-12-03 | End: 2022-12-09 | Stop reason: HOSPADM

## 2022-12-03 RX ORDER — ALBUTEROL SULFATE 2.5 MG/3ML
2.5 SOLUTION RESPIRATORY (INHALATION) EVERY 8 HOURS PRN
Status: DISCONTINUED | OUTPATIENT
Start: 2022-12-03 | End: 2022-12-09 | Stop reason: HOSPADM

## 2022-12-03 RX ORDER — FUROSEMIDE 10 MG/ML
40 INJECTION INTRAMUSCULAR; INTRAVENOUS ONCE
Status: COMPLETED | OUTPATIENT
Start: 2022-12-03 | End: 2022-12-03

## 2022-12-03 RX ORDER — HYDROCODONE BITARTRATE AND ACETAMINOPHEN 7.5; 325 MG/1; MG/1
1 TABLET ORAL EVERY 6 HOURS PRN
Status: DISCONTINUED | OUTPATIENT
Start: 2022-12-03 | End: 2022-12-09 | Stop reason: HOSPADM

## 2022-12-03 RX ORDER — SODIUM CHLORIDE 0.9 % (FLUSH) 0.9 %
10 SYRINGE (ML) INJECTION AS NEEDED
Status: DISCONTINUED | OUTPATIENT
Start: 2022-12-03 | End: 2022-12-09 | Stop reason: HOSPADM

## 2022-12-03 RX ORDER — LEVOTHYROXINE SODIUM 0.05 MG/1
50 TABLET ORAL
Status: DISCONTINUED | OUTPATIENT
Start: 2022-12-04 | End: 2022-12-09 | Stop reason: HOSPADM

## 2022-12-03 RX ORDER — SODIUM CHLORIDE 0.9 % (FLUSH) 0.9 %
5-40 SYRINGE (ML) INJECTION EVERY 12 HOURS SCHEDULED
Status: DISCONTINUED | OUTPATIENT
Start: 2022-12-03 | End: 2022-12-09 | Stop reason: HOSPADM

## 2022-12-03 RX ORDER — POTASSIUM CHLORIDE 750 MG/1
10 TABLET, EXTENDED RELEASE ORAL NIGHTLY
Status: DISCONTINUED | OUTPATIENT
Start: 2022-12-03 | End: 2022-12-04

## 2022-12-03 RX ORDER — TIMOLOL MALEATE 5 MG/ML
1 SOLUTION/ DROPS OPHTHALMIC DAILY
Status: DISCONTINUED | OUTPATIENT
Start: 2022-12-04 | End: 2022-12-09 | Stop reason: HOSPADM

## 2022-12-03 RX ORDER — SODIUM CHLORIDE 0.9 % (FLUSH) 0.9 %
5 SYRINGE (ML) INJECTION EVERY 8 HOURS
Status: DISCONTINUED | OUTPATIENT
Start: 2022-12-03 | End: 2022-12-09 | Stop reason: HOSPADM

## 2022-12-03 RX ORDER — HYDROXYZINE HYDROCHLORIDE 10 MG/1
25 TABLET, FILM COATED ORAL NIGHTLY PRN
Status: DISCONTINUED | OUTPATIENT
Start: 2022-12-03 | End: 2022-12-09 | Stop reason: HOSPADM

## 2022-12-03 RX ORDER — SODIUM CHLORIDE 0.9 % (FLUSH) 0.9 %
5-40 SYRINGE (ML) INJECTION PRN
Status: DISCONTINUED | OUTPATIENT
Start: 2022-12-03 | End: 2022-12-09 | Stop reason: HOSPADM

## 2022-12-03 RX ORDER — METOPROLOL SUCCINATE 50 MG/1
50 TABLET, EXTENDED RELEASE ORAL DAILY
Status: DISCONTINUED | OUTPATIENT
Start: 2022-12-04 | End: 2022-12-09 | Stop reason: HOSPADM

## 2022-12-03 RX ORDER — FERROUS SULFATE 325(65) MG
325 TABLET ORAL DAILY
Status: DISCONTINUED | OUTPATIENT
Start: 2022-12-04 | End: 2022-12-09 | Stop reason: HOSPADM

## 2022-12-03 RX ORDER — FLUOXETINE HYDROCHLORIDE 20 MG/1
60 CAPSULE ORAL DAILY
Status: DISCONTINUED | OUTPATIENT
Start: 2022-12-04 | End: 2022-12-09 | Stop reason: HOSPADM

## 2022-12-03 RX ORDER — SENNA AND DOCUSATE SODIUM 50; 8.6 MG/1; MG/1
2 TABLET, FILM COATED ORAL NIGHTLY
Status: DISCONTINUED | OUTPATIENT
Start: 2022-12-03 | End: 2022-12-09 | Stop reason: HOSPADM

## 2022-12-03 RX ORDER — FUROSEMIDE 10 MG/ML
20 INJECTION INTRAMUSCULAR; INTRAVENOUS ONCE
Status: COMPLETED | OUTPATIENT
Start: 2022-12-03 | End: 2022-12-03

## 2022-12-03 RX ORDER — SODIUM CHLORIDE 9 MG/ML
INJECTION, SOLUTION INTRAVENOUS PRN
Status: DISCONTINUED | OUTPATIENT
Start: 2022-12-03 | End: 2022-12-09 | Stop reason: HOSPADM

## 2022-12-03 RX ORDER — POTASSIUM CHLORIDE 20 MEQ/1
40 TABLET, EXTENDED RELEASE ORAL ONCE
Status: COMPLETED | OUTPATIENT
Start: 2022-12-03 | End: 2022-12-03

## 2022-12-03 RX ORDER — FLECAINIDE ACETATE 50 MG/1
75 TABLET ORAL 2 TIMES DAILY
Status: DISCONTINUED | OUTPATIENT
Start: 2022-12-03 | End: 2022-12-09 | Stop reason: HOSPADM

## 2022-12-03 RX ORDER — POLYETHYLENE GLYCOL 3350 17 G/17G
17 POWDER, FOR SOLUTION ORAL DAILY PRN
Status: DISCONTINUED | OUTPATIENT
Start: 2022-12-03 | End: 2022-12-09 | Stop reason: HOSPADM

## 2022-12-03 RX ORDER — NITROGLYCERIN 20 MG/100ML
5-200 INJECTION INTRAVENOUS CONTINUOUS
Status: DISCONTINUED | OUTPATIENT
Start: 2022-12-03 | End: 2022-12-05

## 2022-12-03 RX ORDER — LORAZEPAM 1 MG/1
1 TABLET ORAL EVERY 12 HOURS PRN
Status: DISCONTINUED | OUTPATIENT
Start: 2022-12-03 | End: 2022-12-09 | Stop reason: HOSPADM

## 2022-12-03 RX ORDER — FUROSEMIDE 10 MG/ML
40 INJECTION INTRAMUSCULAR; INTRAVENOUS 2 TIMES DAILY
Status: DISCONTINUED | OUTPATIENT
Start: 2022-12-04 | End: 2022-12-05

## 2022-12-03 RX ORDER — ACETAMINOPHEN 650 MG/1
650 SUPPOSITORY RECTAL EVERY 6 HOURS PRN
Status: DISCONTINUED | OUTPATIENT
Start: 2022-12-03 | End: 2022-12-09 | Stop reason: HOSPADM

## 2022-12-03 RX ORDER — AMITRIPTYLINE HYDROCHLORIDE 25 MG/1
25 TABLET, FILM COATED ORAL NIGHTLY
Status: DISCONTINUED | OUTPATIENT
Start: 2022-12-03 | End: 2022-12-09 | Stop reason: HOSPADM

## 2022-12-03 RX ORDER — PANTOPRAZOLE SODIUM 40 MG/1
40 TABLET, DELAYED RELEASE ORAL
Status: DISCONTINUED | OUTPATIENT
Start: 2022-12-04 | End: 2022-12-09 | Stop reason: HOSPADM

## 2022-12-03 RX ADMIN — POTASSIUM CHLORIDE 10 MEQ: 750 TABLET, EXTENDED RELEASE ORAL at 23:33

## 2022-12-03 RX ADMIN — NITROGLYCERIN 5 MCG/MIN: 20 INJECTION INTRAVENOUS at 18:27

## 2022-12-03 RX ADMIN — FUROSEMIDE 40 MG: 10 INJECTION, SOLUTION INTRAMUSCULAR; INTRAVENOUS at 17:09

## 2022-12-03 RX ADMIN — HYDROCODONE BITARTRATE AND ACETAMINOPHEN 1 TABLET: 7.5; 325 TABLET ORAL at 22:18

## 2022-12-03 RX ADMIN — FUROSEMIDE 20 MG: 10 INJECTION, SOLUTION INTRAMUSCULAR; INTRAVENOUS at 18:53

## 2022-12-03 RX ADMIN — POTASSIUM CHLORIDE 40 MEQ: 1500 TABLET, EXTENDED RELEASE ORAL at 18:52

## 2022-12-03 RX ADMIN — APIXABAN 5 MG: 5 TABLET, FILM COATED ORAL at 23:33

## 2022-12-03 RX ADMIN — SENNOSIDES AND DOCUSATE SODIUM 2 TABLET: 8.6; 5 TABLET ORAL at 23:33

## 2022-12-03 ASSESSMENT — ENCOUNTER SYMPTOMS
STRIDOR: 0
EYE DISCHARGE: 0
FACIAL SWELLING: 0
DOUBLE VISION: 0
WHEEZING: 0
EYE REDNESS: 0
COLOR CHANGE: 0
HEMOPTYSIS: 0
HEMATOCHEZIA: 0
SHORTNESS OF BREATH: 1
NAUSEA: 0
HEMATEMESIS: 0
DIARRHEA: 0
ABDOMINAL PAIN: 0
HOARSE VOICE: 0

## 2022-12-03 ASSESSMENT — PAIN SCALES - GENERAL: PAINLEVEL_OUTOF10: 7

## 2022-12-03 ASSESSMENT — PAIN - FUNCTIONAL ASSESSMENT: PAIN_FUNCTIONAL_ASSESSMENT: NONE - DENIES PAIN

## 2022-12-03 NOTE — H&P
Hospitalist Admission History and Physical         NAME:            Amanda Jha    Age:                82 y.o.    :               1935    MRN:              [id number]    PCP: Kishan Corona DO    Consulting MD:    Treatment Team: Attending Provider: Maye Montes DO; Registered Nurse: Asad Lockhart, RN; Registered Nurse: Hermilo Mccallum, RN; Consulting Physician: Tiffani Cummins MD         Chief Complaint   Patient presents with    Shortness of Breath     Recent CHF diagnosis    HPI:    Patient is a 82 y.o. female who presented to the ED for cc worsening SOB and edema to LE bilaterally for the past week. Admits to orthopnea. Nothing seems to make better. Saw her PCP this past Tuesday who thought she had a diagnosis of CHF and placed her on PRN lasix. She denies taking lasix daily. Hx of GERD, paroxysmal a fib on Eliquis, PUD, DVT, anxiety, hypothyroidism, bradycardia s/p pacemaker, fibromyalgia. 53% on RA on arrival--Now on high flow 50L 85%     K 3.2. ProBNP 2,517    Chest x ray - Worsening bilateral pulmonary edema or atypical pneumonia pattern. ECHO from 22 - EF 55% no wall motion abnormality at that time.    Past Medical History:   Diagnosis Date    Chest pain 10/30/2015    Dyspnea     GERD (gastroesophageal reflux disease)     Headache     HTN (hypertension), benign 10/30/2015    Malaise and fatigue     Paroxysmal atrial fibrillation ([address] 10/30/2015    Preoperative cardiovascular examination     Psychiatric disorder     PUD (peptic ulcer disease)     Thromboembolus ([address]     leg    Thyroid disease             Past Surgical History:   Procedure Laterality Date    BREAST REDUCTION SURGERY      EP DEVICE PROCEDURE N/A 10/19/2022    INSERT PPM DUAL performed by Benson Turcios MD at [facility] CATH LAB    EP DEVICE PROCEDURE N/A 10/19/2022    Loop recorder removal performed by Benson Turcios MD at [address] (CERVIX STATUS UNKNOWN)      LAP,CHOLECYSTECTOMY      GA APPENDECTOMY              Family History   Problem Relation Age of Onset    Diabetes Brother     Cancer Father         liver    Cancer Brother         prostate    Heart Attack Brother     Cancer Mother         non hog    Diabetes Mother        Family history reviewed and negative except as noted above. Social History     Social History Narrative    Not on file            Social History     Tobacco Use    Smoking status: Former     Packs/day: 1.00     Types: Cigarettes    Smokeless tobacco: Former   Substance Use Topics    Alcohol use: No            Social History     Substance and Sexual Activity   Drug Use Not on file                 Allergies   Allergen Reactions    Aloe Vera Other (See Comments)    Metoclopramide Other (See Comments)    Morphine Other (See Comments)    Penicillins Other (See Comments)    Sulfa Antibiotics Other (See Comments)    Sulfamethoxazole-Trimethoprim Other (See Comments)    Adhesive Tape Rash            Prior to Admission medications    Medication Sig Start Date End Date Taking?  Authorizing Provider   flecainide (TAMBOCOR) 50 MG tablet Take 1.5 tablets by mouth 2 times daily 11/23/22   Ova ShamMD ventura   acetaminophen (TYLENOL) 325 MG tablet Take 650 mg by mouth every 6 hours as needed for Pain    Historical Provider, MD   Multiple Vitamins-Minerals (MULTI COMPLETE PO) Take by mouth    Historical Provider, MD   Probiotic Product (PROBIOTIC PO) Take by mouth    Historical Provider, MD   levalbuterol (XOPENEX) 1.25 MG/3ML nebulizer solution Take 1 ampule by nebulization every 8 hours as needed for Wheezing    Historical Provider, MD   POTASSIUM PO Take 10 mEq by mouth daily    Historical Provider, MD   senna-docusate (PERICOLACE) 8.6-50 MG per tablet Take 1 tablet by mouth at bedtime    Historical Provider, MD   vitamin B-12 (CYANOCOBALAMIN) 1000 MCG tablet Take 1,000 mcg by mouth daily    Historical Provider, MD   loratadine (CLARITIN) 10 MG tablet Take 10 mg by mouth daily    Historical Provider, MD   metoprolol succinate (TOPROL XL) 50 MG extended release tablet Take 1 tablet by mouth daily 11/21/22   Bernadette Ghosh MD   triamterene-hydroCHLOROthiazide [facility]) 75-50 MG per tablet Take 1 tablet by mouth daily 11/21/22   Bernadette Ghosh MD   amLODIPine (NORVASC) 2.5 MG tablet Take 2.5 mg by mouth daily  Patient not taking: Reported on 11/21/2022 7/19/22   Historical Provider, MD   apixaban (ELIQUIS) 5 MG TABS tablet TAKE 1 TABLET BY MOUTH  TWICE DAILY hold 48 hours prior to pacemaker placement 8/16/22   Naty Barnett MD   amitriptyline (ELAVIL) 25 MG tablet Take 25 mg by mouth nightly 1/21/22   Ar Automatic Reconciliation   diclofenac sodium (VOLTAREN) 1 % GEL Apply topically 4 times daily as needed    Ar Automatic Reconciliation   ferrous sulfate (IRON 325) 325 (65 Fe) MG tablet Take 325 mg by mouth daily    Ar Automatic Reconciliation   FLUoxetine (PROZAC) 20 MG capsule Take 20 mg by mouth daily    Ar Automatic Reconciliation   FLUoxetine (PROZAC) 40 MG capsule Take 40 mg by mouth daily    Ar Automatic Reconciliation   HYDROcodone-acetaminophen (NORCO)  MG per tablet Take 1 tablet by mouth every 6 hours. Ar Automatic Reconciliation   hydrOXYzine (ATARAX) 25 MG tablet Take 25 mg by mouth nightly as needed    Ar Automatic Reconciliation   levothyroxine (SYNTHROID) 50 MCG tablet Take by mouth every morning (before breakfast)    Ar Automatic Reconciliation   lisinopril (PRINIVIL;ZESTRIL) 20 MG tablet Take 20 mg by mouth daily  Patient not taking: Reported on 11/21/2022    Ar Automatic Reconciliation   nitroGLYCERIN (NITROSTAT) 0.4 MG SL tablet Place 1 sl under the tongue q 5 min prn cp, max 3 sl in a 15-min time period. Call 911 if no relief after the 3rd sl.     Ar Automatic Reconciliation   omeprazole (PRILOSEC) 40 MG delayed release capsule Take 40 mg by mouth daily 7/5/20   Ar Automatic Reconciliation   potassium chloride (MICRO-K) 10 MEQ extended release capsule Take 10 mEq by mouth at bedtime    Ar Automatic Reconciliation   timolol (TIMOPTIC) 0.5 % ophthalmic solution Apply 1 drop to eye daily    Ar Automatic Reconciliation   Vitamin A 2400 MCG (8000 UT) TABS Take by mouth daily    Ar Automatic Reconciliation                      Review of Systems         Constitutional: NAD    Eyes:  no change in visual acuity, no photophobia    Ears, nose, mouth, throat, and face: no  Odynphagia, dysphagia, no thrush or exudate, negative for chronic sinus congestion, recurrent headaches    Respiratory: SOB    Cardiovascular: negative for CP, orthopnea    Gastrointestinal: negative for abdominal pain, no hematemesis, hematochezia or BRBPR    Genitourinary: no urgency, frequency, or dysuria, no nocturia    Integument/breast: negative for skin rash or skin lesions    Hematologic/lymphatic: negative for known bleeding disorder    Musculoskeletal:LE edema bilaterally     Neurological: generalized weakness    Behavioral/Psych: negative for depression or chronic anxiety,    Endocrine: negative for polydyspia, polyuria or intolerance to heat or cold    Allergic/Immunologic: negative for chronic allergic rhinitis, or known connective tissue disorder              Objective:         Patient Vitals for the past 24 hrs:   Temp Pulse Resp BP SpO2   12/03/22 1815 -- 70 21 -- 96 %   12/03/22 1800 -- 70 22 (!) 147/60 95 %   12/03/22 1745 -- 71 12 (!) 149/55 95 %   12/03/22 1730 -- 70 16 (!) 154/129 96 %   12/03/22 1715 -- 71 21 (!) 149/57 96 %   12/03/22 1700 -- 70 18 (!) 142/50 94 %   12/03/22 1645 -- 69 23 (!) 153/58 96 %   12/03/22 1630 -- 70 22 (!) 149/54 95 %   12/03/22 1615 -- 72 20 (!) 147/60 97 %   12/03/22 1600 -- 71 30 (!) 141/55 97 %   12/03/22 1545 -- 70 21 (!) 140/57 97 %   12/03/22 1530 -- 81 24 (!) 148/54 97 %   12/03/22 1515 -- 80 28 (!) 162/79 92 %   12/03/22 1500 -- 69 (!) 32 -- 93 %   12/03/22 1457 98.1 °F (36.7 °C) -- -- -- --   12/03/22 1458 98.7 °F (37.1 °C) 77 20 134/60 (!) 53 %            No intake/output data recorded. No intake/output data recorded.          Data Review:   Recent Results (from the past 24 hour(s))   CBC with Auto Differential    Collection Time: 12/03/22  3:06 PM   Result Value Ref Range    WBC 9.7 4.3 - 11.1 K/uL    RBC 2.84 (L) 4.05 - 5.2 M/uL    Hemoglobin 8.1 (L) 11.7 - 15.4 g/dL    Hematocrit 26.6 (L) 35.8 - 46.3 %    MCV 93.7 82 - 102 FL    MCH 28.5 26.1 - 32.9 PG    MCHC 30.5 (L) 31.4 - 35.0 g/dL    RDW 17.8 (H) 11.9 - 14.6 %    Platelets 773 532 - 048 K/uL    MPV 9.5 9.4 - 12.3 FL    nRBC 0.00 0.0 - 0.2 K/uL    Differential Type AUTOMATED      Seg Neutrophils 77 43 - 78 %    Lymphocytes 10 (L) 13 - 44 %    Monocytes 6 4.0 - 12.0 %    Eosinophils % 6 0.5 - 7.8 %    Basophils 0 0.0 - 2.0 %    Immature Granulocytes 1 0.0 - 5.0 %    Segs Absolute 7.6 1.7 - 8.2 K/UL    Absolute Lymph # 0.9 0.5 - 4.6 K/UL    Absolute Mono # 0.6 0.1 - 1.3 K/UL    Absolute Eos # 0.6 0.0 - 0.8 K/UL    Basophils Absolute 0.0 0.0 - 0.2 K/UL    Absolute Immature Granulocyte 0.1 0.0 - 0.5 K/UL   Comprehensive Metabolic Panel    Collection Time: 12/03/22  3:06 PM   Result Value Ref Range    Sodium 139 133 - 143 mmol/L    Potassium 3.2 (L) 3.5 - 5.1 mmol/L    Chloride 103 101 - 110 mmol/L    CO2 31 21 - 32 mmol/L    Anion Gap 5 2 - 11 mmol/L    Glucose 128 (H) 65 - 100 mg/dL    BUN 20 8 - 23 MG/DL    Creatinine 0.80 0.6 - 1.0 MG/DL    Est, Glom Filt Rate >60 >60 ml/min/1.73m2    Calcium 9.3 8.3 - 10.4 MG/DL    Total Bilirubin 1.0 0.2 - 1.1 MG/DL    ALT 29 12 - 65 U/L    AST 40 (H) 15 - 37 U/L    Alk Phosphatase 128 50 - 136 U/L    Total Protein 6.3 6.3 - 8.2 g/dL    Albumin 2.7 (L) 3.2 - 4.6 g/dL    Globulin 3.6 2.8 - 4.5 g/dL    Albumin/Globulin Ratio 0.8 0.4 - 1.6     Magnesium    Collection Time: 12/03/22  3:06 PM   Result Value Ref Range    Magnesium 2.0 1.8 - 2.4 mg/dL   Troponin    Collection Time: 12/03/22  3:06 PM   Result Value Ref Range Troponin, High Sensitivity 14.6 (H) 0 - 14 pg/mL   Lactic Acid    Collection Time: 12/03/22  3:06 PM   Result Value Ref Range    Lactic Acid, Plasma 1.4 0.4 - 2.0 MMOL/L   Brain Natriuretic Peptide    Collection Time: 12/03/22  3:06 PM   Result Value Ref Range    NT Pro-BNP 2,517 (H) <450 PG/ML   Arterial Blood Gas, POC    Collection Time: 12/03/22  3:13 PM   Result Value Ref Range    DEVICE Non rebreather      FIO2 100 %    pH, Arterial, POC 7.46 (H) 7.35 - 7.45      pCO2, Arterial, POC 39.5 35 - 45 MMHG    pO2, Arterial,  (H) 75 - 100 MMHG    HCO3, Mixed 27.8 (H) 22 - 26 MMOL/L    SO2c, Arterial, POC 99.0 (H) 95 - 98 %    Base Excess 3.7 mmol/L    POC Thomas's Test Positive      Site LEFT RADIAL      Specimen type: ARTERIAL      Performed by: Marino(Garibay)Debora    EKG 12 Lead    Collection Time: 12/03/22  3:19 PM   Result Value Ref Range    Ventricular Rate 72 BPM    Atrial Rate 73 BPM    P-R Interval 204 ms    QRS Duration 94 ms    Q-T Interval 409 ms    QTc Calculation (Bazett) 448 ms    R Axis 27 degrees    T Axis 13 degrees    Diagnosis Atrial-paced rhythm    Troponin    Collection Time: 12/03/22  5:16 PM   Result Value Ref Range    Troponin, High Sensitivity 16.3 (H) 0 - 14 pg/mL            Physical Exam:         General:    Alert, cooperative, tired appearing, pale, accessory muscles being used to help breath. Moist skin    Eyes:    Conjunctivae/corneas clear. PERRL    Ears:    Normal     Nose:    Nares normal.     Mouth/Throat:    Lips, mucosa, and tongue normal. Teeth and gums normal.    Neck:    Mild JVD. Back:     deferred    Lungs: Inspiratory crackles to lower lobes bilaterally with limited air movement     Heart:    Regular rate and rhythm, S1, S2 normal    Abdomen:     Soft, non-tender. Bowel sounds normal.    Extremities:    Trace edema bilaterally to LE    Skin:    Skin color, texture, turgor normal. No rashes or lesions    Neurologic:    CNII-XII intact.  Limited ROM in bed Assessment and Plan         Principal Problem:    Acute respiratory failure (HCC)  Active Problems:    Pacemaker    Anemia, unspecified    Hypokalemia    Anticoagulant long-term use    Paroxysmal atrial fibrillation (HCC)    HTN (hypertension), benign    Long term (current) use of anticoagulants    Bradycardia  Resolved Problems:    * No resolved hospital problems. *    Acute respiratory failure secondary to suspectd CHF exacerbation versus atypical pneumonia - No fevers or leukocytosis. Lymphocytes are decreased so will check for COVID. Holding off on antibiotics but will check procal. PRN xopenex. Place on continuous CPAP ( I have call RT ), place on nitro drip, giving a total of lasix 60mg now. Start lasix 40mg BID tomorrow. I have personally reached out to cardiology who will see her tonight. Strict Is and Os. Since ordered continuous CPAP, will make NPO. Consult pulmonology. ECHO ordered    Hypokalemia - Supplement.  Mg normal     pA fib - Eliquis BID, flecainide    HTN- BB    Depression - Proaz, PRN ativan    Hypothyroidism - Synthroid    Fibromyalgia, Elavil, PRN Norco     Dulera    PPI    PPD/PT/OT     DVT prophylaxis - Eliquis  Signed By:    Luis Downey DO    December 3, 2022

## 2022-12-03 NOTE — PROGRESS NOTES
[facility] Cardiology Consult                Date of  Admission: 12/3/2022  2:56 PM       Primary Cardiologist: Dr Tere Tavera  Referring Physician: Dr Nicole Newman Physician: Dr Dinh Almanza    CC/Reason for consult: CHF      Fernando Russell is a 82 y.o. female admitted for worsening dyspnea on exertion and was found to have oxygen saturations were low at home so she was brought in by the EMTs. She has known symptomatic bradycardia, sick sinus syndrome s/p recent permanent pacemaker placement on 10/19/2022 with Dr. Tere Tavera.  She was discharged the next day. She said over the past few weeks, she has had increasing dyspnea and when she saw her primary care, they thought she had some amount of congestive heart failure, started her on Lasix by mouth but she felt that her shortness of breath did not improve much. She was recently seen at [facility] per her daughter and was told that she had a pneumonia and was treated for it. She denies any recent fever chills or any significant productive cough. Some amount of orthopnea/PND symptoms also with her dyspnea on exertion noted. Blood work is suggestive of a fairly significant anemia with a hemoglobin at 8. She was already on iron sulfate 325 mg once daily. She has been anticoagulated with Eliquis 5 mg twice daily for thromboembolic prophylaxis but denies any bleeding through her gut or any other source of bleeding so far. No anginal chest discomfort in any way. She has history of paroxysmal atrial fibrillation but denies any significant palpitations.   No TIAs or strokelike symptoms    Patient Active Problem List   Diagnosis    Chest pain    Falls    Anticoagulant long-term use    Paroxysmal atrial fibrillation (HCC)    HTN (hypertension), benign    Syncope    Long term (current) use of anticoagulants    Bradycardia    Pacemaker    Acute respiratory failure (HCC)    Anemia, unspecified    Hypokalemia       Past Medical History:   Diagnosis Date Chest pain 10/30/2015    Dyspnea     GERD (gastroesophageal reflux disease)     Headache     HTN (hypertension), benign 10/30/2015    Malaise and fatigue     Paroxysmal atrial fibrillation ([facility] [address] 10/30/2015    Preoperative cardiovascular examination     Psychiatric disorder     PUD (peptic ulcer disease)     Thromboembolus ([facility] [address]     leg    Thyroid disease       Past Surgical History:   Procedure Laterality Date    BREAST REDUCTION SURGERY      EP DEVICE PROCEDURE N/A 10/19/2022    INSERT PPM DUAL performed by Ruddy العلي MD at [address] [place] [address] CATH LAB    EP DEVICE PROCEDURE N/A 10/19/2022    Loop recorder removal performed by Ruddy العلي MD at [facility] CARDIAC CATH LAB    EYE SURGERY      HYSTERECTOMY (CERVIX STATUS UNKNOWN)      LAP,CHOLECYSTECTOMY      KS APPENDECTOMY       Allergies   Allergen Reactions    Aloe Vera Other (See Comments)    Metoclopramide Other (See Comments)    Morphine Other (See Comments)    Penicillins Other (See Comments)    Sulfa Antibiotics Other (See Comments)    Sulfamethoxazole-Trimethoprim Other (See Comments)    Adhesive Tape Rash      Family History   Problem Relation Age of Onset    Diabetes Brother     Cancer Father         liver    Cancer Brother         prostate    Heart Attack Brother     Cancer Mother         non hog    Diabetes Mother         Current Facility-Administered Medications   Medication Dose Route Frequency    sodium chloride flush 0.9 % injection 5 mL  5 mL IntraVENous Q8H    sodium chloride flush 0.9 % injection 10 mL  10 mL IntraVENous PRN    amitriptyline (ELAVIL) tablet 25 mg  25 mg Oral Nightly    diclofenac sodium (VOLTAREN) 1 % gel 1 g  1 g Topical 4x Daily PRN    apixaban (ELIQUIS) tablet 5 mg  5 mg Oral BID    [START ON 12/4/2022] ferrous sulfate (IRON 325) tablet 325 mg  325 mg Oral Daily    flecainide (TAMBOCOR) tablet 75 mg  75 mg Oral BID    [START ON 12/4/2022] FLUoxetine (PROZAC) capsule 60 mg  60 mg Oral Daily    hydrOXYzine HCl (ATARAX) tablet 25 mg 25 mg Oral Nightly PRN    [START ON 12/4/2022] levothyroxine (SYNTHROID) tablet 50 mcg  50 mcg Oral QAM AC    [START ON 12/4/2022] metoprolol succinate (TOPROL XL) extended release tablet 50 mg  50 mg Oral Daily    [START ON 12/4/2022] pantoprazole (PROTONIX) tablet 40 mg  40 mg Oral QAM AC    potassium chloride (MICRO-K) extended release capsule 10 mEq  10 mEq Oral Nightly    timolol (TIMOPTIC) 0.5 % ophthalmic solution 1 drop  1 drop Ophthalmic Daily    mometasone-formoterol (DULERA) 200-5 MCG/ACT inhaler 2 puff  2 puff Inhalation BID    LORazepam (ATIVAN) tablet 1 mg  1 mg Oral Q12H PRN    HYDROcodone-acetaminophen (NORCO) 7.5-325 MG per tablet 1 tablet  1 tablet Oral Q6H PRN    sennosides-docusate sodium (SENOKOT-S) 8.6-50 MG tablet 2 tablet  2 tablet Oral Nightly    furosemide (LASIX) injection 20 mg  20 mg IntraVENous Once    [START ON 12/4/2022] furosemide (LASIX) injection 40 mg  40 mg IntraVENous BID    levalbuterol (XOPENEX) nebulizer solution 1.25 mg  1.25 mg Nebulization Q8H PRN    potassium chloride (KLOR-CON M) extended release tablet 40 mEq  40 mEq Oral Once    nitroGLYCERIN 50 mg in dextrose 5% 250 mL infusion  5-200 mcg/min IntraVENous Continuous    sodium chloride flush 0.9 % injection 5-40 mL  5-40 mL IntraVENous 2 times per day    sodium chloride flush 0.9 % injection 5-40 mL  5-40 mL IntraVENous PRN    0.9 % sodium chloride infusion   IntraVENous PRN    polyethylene glycol (GLYCOLAX) packet 17 g  17 g Oral Daily PRN    acetaminophen (TYLENOL) tablet 650 mg  650 mg Oral Q6H PRN    Or    acetaminophen (TYLENOL) suppository 650 mg  650 mg Rectal Q6H PRN    tuberculin injection 5 Units  5 Units IntraDERmal Once     Current Outpatient Medications   Medication Sig    flecainide (TAMBOCOR) 50 MG tablet Take 1.5 tablets by mouth 2 times daily    acetaminophen (TYLENOL) 325 MG tablet Take 650 mg by mouth every 6 hours as needed for Pain    Multiple Vitamins-Minerals (MULTI COMPLETE PO) Take by mouth Probiotic Product (PROBIOTIC PO) Take by mouth    levalbuterol (XOPENEX) 1.25 MG/3ML nebulizer solution Take 1 ampule by nebulization every 8 hours as needed for Wheezing    POTASSIUM PO Take 10 mEq by mouth daily    senna-docusate (PERICOLACE) 8.6-50 MG per tablet Take 1 tablet by mouth at bedtime    vitamin B-12 (CYANOCOBALAMIN) 1000 MCG tablet Take 1,000 mcg by mouth daily    loratadine (CLARITIN) 10 MG tablet Take 10 mg by mouth daily    metoprolol succinate (TOPROL XL) 50 MG extended release tablet Take 1 tablet by mouth daily    triamterene-hydroCHLOROthiazide (MAXZIDE) 75-50 MG per tablet Take 1 tablet by mouth daily    amLODIPine (NORVASC) 2.5 MG tablet Take 2.5 mg by mouth daily (Patient not taking: Reported on 11/21/2022)    apixaban (ELIQUIS) 5 MG TABS tablet TAKE 1 TABLET BY MOUTH  TWICE DAILY hold 48 hours prior to pacemaker placement    amitriptyline (ELAVIL) 25 MG tablet Take 25 mg by mouth nightly    diclofenac sodium (VOLTAREN) 1 % GEL Apply topically 4 times daily as needed    ferrous sulfate (IRON 325) 325 (65 Fe) MG tablet Take 325 mg by mouth daily    FLUoxetine (PROZAC) 20 MG capsule Take 20 mg by mouth daily    FLUoxetine (PROZAC) 40 MG capsule Take 40 mg by mouth daily    HYDROcodone-acetaminophen (NORCO)  MG per tablet Take 1 tablet by mouth every 6 hours. hydrOXYzine (ATARAX) 25 MG tablet Take 25 mg by mouth nightly as needed    levothyroxine (SYNTHROID) 50 MCG tablet Take by mouth every morning (before breakfast)    lisinopril (PRINIVIL;ZESTRIL) 20 MG tablet Take 20 mg by mouth daily (Patient not taking: Reported on 11/21/2022)    nitroGLYCERIN (NITROSTAT) 0.4 MG SL tablet Place 1 sl under the tongue q 5 min prn cp, max 3 sl in a 15-min time period. Call 911 if no relief after the 3rd sl.     omeprazole (PRILOSEC) 40 MG delayed release capsule Take 40 mg by mouth daily    potassium chloride (MICRO-K) 10 MEQ extended release capsule Take 10 mEq by mouth at bedtime    timolol (TIMOPTIC) 0.5 % ophthalmic solution Apply 1 drop to eye daily    Vitamin A 2400 MCG (8000 UT) TABS Take by mouth daily       Review of Systems   Constitutional: Negative for chills and fever. HENT:  Negative for ear discharge, hoarse voice and stridor. Eyes:  Negative for double vision and redness. Cardiovascular:  Positive for dyspnea on exertion. Negative for chest pain, cyanosis and syncope. Respiratory:  Negative for hemoptysis and wheezing. Endocrine: Negative for polydipsia and polyphagia. Hematologic/Lymphatic: Negative for adenopathy. Skin:  Negative for itching and rash. Musculoskeletal:  Negative for joint swelling and muscle weakness. Gastrointestinal:  Negative for abdominal pain, hematemesis and hematochezia. Genitourinary:  Negative for flank pain and nocturia. Neurological:  Negative for focal weakness and seizures. Psychiatric/Behavioral:  Negative for altered mental status and suicidal ideas. Allergic/Immunologic: Negative for hives. Physical Exam  Vitals:    12/03/22 1700 12/03/22 1715 12/03/22 1730 12/03/22 1745   BP: (!) 142/50 (!) 149/57 (!) 154/129 (!) 149/55   Pulse: 70 71 70 71   Resp: 18 21 16 12   Temp:       TempSrc:       SpO2: 94% 96% 96% 95%   Weight:       Height:           Physical Exam:  General: Well Developed, Well Nourished, No Acute Distress  Head: Normocephalic, atraumatic  ENT: pupils equal and round, no abnormalities noted  Neck: supple, no JVD, no carotid bruits  Heart: S1, S2 with RRR without murmurs or gallops  Lungs: Clear throughout auscultation bilaterally without adventitious sounds  Abd: soft, nontender, nondistended, with good bowel sounds  Ext: warm, no edema, calves supple/nontender, pulses 2+ bilaterally  Skin: No obvious rashes noted on the exposed regions. Psychiatric: Normal mood and affect  Neurologic: Alert and oriented X 3, no gross motor or neurological deficits noted.   Peripheral pulses: Well felt in all 4 extremities. Cardiographics    Telemetry: Atrial paced/ventricular sensed rhythm  ECG: Shows atrial paced rhythm , no significant ST or T wave changes for ischemia  Echocardiogram: Echo from 8/8/2022 showed an EF at 55 to 60% with no regional wall motion abnormalities, normal diastolic function for age, at most mildly dilated left atrium    Labs:   Recent Labs     12/03/22  1506      K 3.2*   MG 2.0   BUN 20   WBC 9.7   HGB 8.1*   HCT 26.6*          Chest x-ray was personally reviewed shows bilateral interstitial fullness/possible infiltrates right greater than left   Assessment/Plan:     Assessment:      Principal Problem:    Acute hypoxic respiratory failure (HCC)  -Oxygen saturations reported on room air were in the 50s to 70s range.  -Has received IV Lasix-strict intake and output and daily weights    Active Problems:    Pacemaker/ History of symptomatic bradycardia  -S/p recent permanent pacemaker placement for sick sinus syndrome      Anemia, unspecified  -Uncertain etiology-on ferrous sulfate 325 mg once daily. Checking iron studies. Hypokalemia  -Being replaced. Paroxysmal atrial fibrillation (HCC)  -Maintaining sinus rhythm with flecainide 75 mg twice daily along with metoprolol succinate 50 mg once daily, anticoagulated with Eliquis for thromboembolic prophylaxis. HTN (hypertension), benign  -Borderline elevated pressures noted-continue metoprolol succinate, has received IV Lasix, will consider IV nitroglycerin drip      Long term (current) use of anticoagulants  -Usually on Eliquis but if there is any further drop in hemoglobin levels, we will hold it. Fortunately maintaining sinus rhythm    Plans discussed with Dr. Bharat Bangura with admission, agree with pulmonary consult. Strict intake and output and daily weights, get echo, nitroglycerin drip, continue metoprolol, continue Eliquis for now.  Checking Iron Studies        Thank you very much for this referral. We appreciate the opportunity to participate in this patient's care. We will follow along with above stated plan.     Lady Richy MD  Consulting MD:

## 2022-12-03 NOTE — ED PROVIDER NOTES
[facility] Emergency Department Provider Note                   PCP:                Karel Aceves DO               Age: 82 y.o. Sex: female     Chief Complaint   Patient presents with    Shortness of Breath     Recent CHF diagnosis        21-year-old female presented to the emergency department for evaluation of shortness of breath. She states that her shortness of breath has been worsening over the last 4 to 5 days. On Tuesday she was seen by her primary care doctor, was noted to have bilateral basilar crackles and was diagnosed with new acute congestive heart failure. She was started on a beta-blocker and also started on Lasix at that time. She has been getting progressively worse. She is complaining of shortness of breath with exertion. EMS reports that on their arrival she was hypoxic air sat of 84 to 85%. Initially she did not come up on nasal cannula and required a facemask, they state her color improved at that time and she was able to transition back to a nasal cannula. She has not had any fevers. She notes her shortness of breath is worse when she lays flat and improves when she sits up. She has not noticed any significant edema in her lower extremities. She does have a history of atrial fibrillation, has a recent history of a pacemaker placement and is a patient at [facility] cardiology          Review of Systems   Constitutional:  Negative for activity change, appetite change and fever. HENT:  Negative for congestion and facial swelling. Eyes:  Negative for discharge. Respiratory:  Positive for shortness of breath. Cardiovascular:  Negative for chest pain and leg swelling. Gastrointestinal:  Negative for abdominal pain, diarrhea and nausea. Genitourinary:  Negative for dysuria. Musculoskeletal:  Negative for neck pain. Skin:  Negative for color change and rash. Neurological:  Negative for dizziness, numbness and headaches.      Past Medical History:   Diagnosis Date    Chest pain 10/30/2015    Dyspnea     GERD (gastroesophageal reflux disease)     Headache     HTN (hypertension), benign 10/30/2015    Malaise and fatigue     Paroxysmal atrial fibrillation ([facility] [address] 10/30/2015    Preoperative cardiovascular examination     Psychiatric disorder     PUD (peptic ulcer disease)     Thromboembolus ([facility] [address]     leg    Thyroid disease         Past Surgical History:   Procedure Laterality Date    BREAST REDUCTION SURGERY      EP DEVICE PROCEDURE N/A 10/19/2022    INSERT PPM DUAL performed by Keke Gamboa MD at [address] [place] [address] CATH LAB    EP DEVICE PROCEDURE N/A 10/19/2022    Loop recorder removal performed by Keke Gamboa MD at [address] [place] [address] CATH LAB    EYE SURGERY      HYSTERECTOMY (CERVIX STATUS UNKNOWN)      LAP,CHOLECYSTECTOMY      VT APPENDECTOMY          Family History   Problem Relation Age of Onset    Diabetes Brother     Cancer Father         liver    Cancer Brother         prostate    Heart Attack Brother     Cancer Mother         non hog    Diabetes Mother         Social History     Socioeconomic History    Marital status:    Tobacco Use    Smoking status: Former     Packs/day: 1.00     Types: Cigarettes    Smokeless tobacco: Former   Substance and Sexual Activity    Alcohol use: No         Aloe vera, Metoclopramide, Morphine, Penicillins, Sulfa antibiotics, Sulfamethoxazole-trimethoprim, and Adhesive tape     Previous Medications    ACETAMINOPHEN (TYLENOL) 325 MG TABLET    Take 650 mg by mouth every 6 hours as needed for Pain    AMITRIPTYLINE (ELAVIL) 25 MG TABLET    Take 25 mg by mouth nightly    AMLODIPINE (NORVASC) 2.5 MG TABLET    Take 2.5 mg by mouth daily    APIXABAN (ELIQUIS) 5 MG TABS TABLET    TAKE 1 TABLET BY MOUTH  TWICE DAILY hold 48 hours prior to pacemaker placement    DICLOFENAC SODIUM (VOLTAREN) 1 % GEL    Apply topically 4 times daily as needed    FERROUS SULFATE (IRON 325) 325 (65 FE) MG TABLET    Take 325 mg by mouth daily    FLECAINIDE (TAMBOCOR) 50 MG TABLET    Take 1.5 tablets by mouth 2 times daily    FLUOXETINE (PROZAC) 20 MG CAPSULE    Take 20 mg by mouth daily    FLUOXETINE (PROZAC) 40 MG CAPSULE    Take 40 mg by mouth daily    HYDROCODONE-ACETAMINOPHEN (NORCO)  MG PER TABLET    Take 1 tablet by mouth every 6 hours. HYDROXYZINE (ATARAX) 25 MG TABLET    Take 25 mg by mouth nightly as needed    LEVALBUTEROL (XOPENEX) 1.25 MG/3ML NEBULIZER SOLUTION    Take 1 ampule by nebulization every 8 hours as needed for Wheezing    LEVOTHYROXINE (SYNTHROID) 50 MCG TABLET    Take by mouth every morning (before breakfast)    LISINOPRIL (PRINIVIL;ZESTRIL) 20 MG TABLET    Take 20 mg by mouth daily    LORATADINE (CLARITIN) 10 MG TABLET    Take 10 mg by mouth daily    METOPROLOL SUCCINATE (TOPROL XL) 50 MG EXTENDED RELEASE TABLET    Take 1 tablet by mouth daily    MULTIPLE VITAMINS-MINERALS (MULTI COMPLETE PO)    Take by mouth    NITROGLYCERIN (NITROSTAT) 0.4 MG SL TABLET    Place 1 sl under the tongue q 5 min prn cp, max 3 sl in a 15-min time period. Call 911 if no relief after the 3rd sl. OMEPRAZOLE (PRILOSEC) 40 MG DELAYED RELEASE CAPSULE    Take 40 mg by mouth daily    POTASSIUM CHLORIDE (MICRO-K) 10 MEQ EXTENDED RELEASE CAPSULE    Take 10 mEq by mouth at bedtime    POTASSIUM PO    Take 10 mEq by mouth daily    PROBIOTIC PRODUCT (PROBIOTIC PO)    Take by mouth    SENNA-DOCUSATE (PERICOLACE) 8.6-50 MG PER TABLET    Take 1 tablet by mouth at bedtime    TIMOLOL (TIMOPTIC) 0.5 % OPHTHALMIC SOLUTION    Apply 1 drop to eye daily    TRIAMTERENE-HYDROCHLOROTHIAZIDE (MAXZIDE) 75-50 MG PER TABLET    Take 1 tablet by mouth daily    VITAMIN A 2400 MCG (8000 UT) TABS    Take by mouth daily    VITAMIN B-12 (CYANOCOBALAMIN) 1000 MCG TABLET    Take 1,000 mcg by mouth daily        Vitals signs and nursing note reviewed.    Patient Vitals for the past 4 hrs:   Temp Pulse Resp BP SpO2   12/03/22 1630 -- 70 22 (!) 149/54 95 %   12/03/22 1615 -- 72 20 (!) 147/60 97 %   12/03/22 1600 -- 71 30 (!) 141/55 97 %   12/03/22 1545 -- 70 21 (!) 140/57 97 %   12/03/22 1530 -- 81 24 (!) 148/54 97 %   12/03/22 1515 -- 80 28 (!) 162/79 92 %   12/03/22 1500 -- 69 (!) 32 -- 93 %   12/03/22 1457 98.1 °F (36.7 °C) -- -- -- --   12/03/22 1452 98.7 °F (37.1 °C) 77 20 134/60 (!) 53 %          Physical Exam  Vitals and nursing note reviewed. Constitutional:       General: She is not in acute distress. Appearance: Normal appearance. She is normal weight. She is not ill-appearing or toxic-appearing. HENT:      Head: Normocephalic and atraumatic. Mouth/Throat:      Mouth: Mucous membranes are moist.   Eyes:      Extraocular Movements: Extraocular movements intact. Cardiovascular:      Rate and Rhythm: Normal rate and regular rhythm. Pulses: Normal pulses. Heart sounds: Normal heart sounds. Pulmonary:      Effort: Pulmonary effort is normal. No respiratory distress. Breath sounds: Examination of the right-middle field reveals decreased breath sounds. Examination of the left-middle field reveals decreased breath sounds. Examination of the right-lower field reveals decreased breath sounds. Examination of the left-lower field reveals decreased breath sounds. Decreased breath sounds present. Comments: Bibasilar crackles  Abdominal:      General: There is no distension. Palpations: Abdomen is soft. Tenderness: There is no abdominal tenderness. Musculoskeletal:      Cervical back: Normal range of motion and neck supple. Right lower leg: Edema present. Left lower leg: Edema present. Skin:     General: Skin is dry. Findings: No rash. Neurological:      General: No focal deficit present. Mental Status: She is alert and oriented to person, place, and time. Mental status is at baseline.    Psychiatric:         Mood and Affect: Mood normal.         Behavior: Behavior normal.        EKG 12 Lead    Date/Time: 12/3/2022 5:01 PM  Performed by: Aysha Mcdonald Nohemi Marie MD  Authorized by: Rose Goss MD     ECG reviewed by ED Physician in the absence of a cardiologist: yes    Interpretation:     Interpretation: abnormal    Rate:     ECG rate:  72  Rhythm:     Rhythm: paced    Ectopy:     Ectopy: none    Comments:      No ST segment changes    Labs Reviewed   CBC WITH AUTO DIFFERENTIAL - Abnormal; Notable for the following components:       Result Value    RBC 2.84 (*)     Hemoglobin 8.1 (*)     Hematocrit 26.6 (*)     MCHC 30.5 (*)     RDW 17.8 (*)     Lymphocytes 10 (*)     All other components within normal limits   COMPREHENSIVE METABOLIC PANEL - Abnormal; Notable for the following components:    Potassium 3.2 (*)     Glucose 128 (*)     AST 40 (*)     Albumin 2.7 (*)     All other components within normal limits   TROPONIN - Abnormal; Notable for the following components:    Troponin, High Sensitivity 14.6 (*)     All other components within normal limits   BRAIN NATRIURETIC PEPTIDE - Abnormal; Notable for the following components:    NT Pro-BNP 2,517 (*)     All other components within normal limits   ARTERIAL BLOOD GAS, POC - Abnormal; Notable for the following components:    pH, Arterial, POC 7.46 (*)     pO2, Arterial,  (*)     HCO3, Mixed 27.8 (*)     SO2c, Arterial, POC 99.0 (*)     All other components within normal limits   MAGNESIUM   LACTIC ACID   BRAIN NATRIURETIC PEPTIDE   BRAIN NATRIURETIC PEPTIDE        XR CHEST PORTABLE   Final Result   Worsening bilateral pulmonary edema or atypical pneumonia pattern. MDM  Number of Diagnoses or Management Options  Acute congestive heart failure, unspecified heart failure type [facility])  Diagnosis management comments: 5:02 PM  Chest x-ray shows pulmonary edema in the setting of hypoxia, new onset diagnosis of congestive heart failure, bilateral lower extremity edema and crackles on exam.  Patient's been given a dose of Lasix here in the emergency department.   I do not think that she has pneumonia

## 2022-12-03 NOTE — ACP (ADVANCE CARE PLANNING)
[facility] Hospitalist Service  At the heart of better care     Advance Care Planning   Admit Date:  12/3/2022  2:56 PM   Name:  Sylvester Hernandez   Age:  82 y.o. Sex:  female  :  1935   MRN:  [id number]   Room:  [address] [place] [address] Road is able to make her own decisions:   Yes    Patient / surrogate decision-maker directed code status:  FULL    Patient or surrogate consented to discussion of the current conditions, workup, management plans, prognosis, and the risk for further deterioration. Time spent: 17 minutes in direct discussion.       Signed:  Jasmyn Weston DO

## 2022-12-03 NOTE — ED TRIAGE NOTES
Arrived via ems for sob. Ems arrived at pt residence with reports of sob. Diagnosed with pneumonia 1 week ago and CHF 3 days prior, coughing yesterday. Woke up today with sob @ 83% 5-6 nc could not go over 86% 10 L non re breather then put on nc after stabilized . 172/86 bgl 104. Lungs sound wet, rales in bases, diagnosed with chf a few days ago 20 g rac. . Vitals moitored till arrival. 97.8 temp.  78 pr

## 2022-12-04 ENCOUNTER — APPOINTMENT (OUTPATIENT)
Dept: GENERAL RADIOLOGY | Age: 87
End: 2022-12-04
Payer: MEDICARE

## 2022-12-04 ENCOUNTER — APPOINTMENT (OUTPATIENT)
Dept: NON INVASIVE DIAGNOSTICS | Age: 87
End: 2022-12-04
Payer: MEDICARE

## 2022-12-04 PROBLEM — R91.8 PULMONARY INFILTRATES: Status: ACTIVE | Noted: 2022-01-01

## 2022-12-04 LAB
ALBUMIN SERPL-MCNC: 2.5 G/DL (ref 3.2–4.6)
ALBUMIN/GLOB SERPL: 0.7 {RATIO} (ref 0.4–1.6)
ALP SERPL-CCNC: 133 U/L (ref 50–136)
ALT SERPL-CCNC: 26 U/L (ref 12–65)
ANION GAP SERPL CALC-SCNC: 5 MMOL/L (ref 2–11)
AST SERPL-CCNC: 41 U/L (ref 15–37)
B PERT DNA SPEC QL NAA+PROBE: NOT DETECTED
BASOPHILS # BLD: 0 K/UL (ref 0–0.2)
BASOPHILS NFR BLD: 0 % (ref 0–2)
BILIRUB SERPL-MCNC: 1.2 MG/DL (ref 0.2–1.1)
BORDETELLA PARAPERTUSSIS BY PCR: NOT DETECTED
BUN SERPL-MCNC: 17 MG/DL (ref 8–23)
C PNEUM DNA SPEC QL NAA+PROBE: NOT DETECTED
CALCIUM SERPL-MCNC: 9.2 MG/DL (ref 8.3–10.4)
CHLORIDE SERPL-SCNC: 99 MMOL/L (ref 101–110)
CO2 SERPL-SCNC: 35 MMOL/L (ref 21–32)
CREAT SERPL-MCNC: 0.9 MG/DL (ref 0.6–1)
DIFFERENTIAL METHOD BLD: ABNORMAL
ECHO AO ASC DIAM: 3 CM
ECHO AO ASCENDING AORTA INDEX: 1.52 CM/M2
ECHO AO ROOT DIAM: 3.2 CM
ECHO AO ROOT INDEX: 1.62 CM/M2
ECHO AV AREA PEAK VELOCITY: 2.5 CM2
ECHO AV AREA VTI: 2.5 CM2
ECHO AV AREA/BSA PEAK VELOCITY: 1.3 CM2/M2
ECHO AV AREA/BSA VTI: 1.3 CM2/M2
ECHO AV MEAN GRADIENT: 7 MMHG
ECHO AV MEAN VELOCITY: 1.2 M/S
ECHO AV PEAK GRADIENT: 12 MMHG
ECHO AV PEAK VELOCITY: 1.8 M/S
ECHO AV VELOCITY RATIO: 0.78
ECHO AV VTI: 38.1 CM
ECHO BSA: 2.01 M2
ECHO EST RA PRESSURE: 3 MMHG
ECHO IVC PROX: 1.9 CM
ECHO LA AREA 2C: 26.3 CM2
ECHO LA AREA 4C: 18 CM2
ECHO LA DIAMETER INDEX: 1.31 CM/M2
ECHO LA DIAMETER: 2.6 CM
ECHO LA MAJOR AXIS: 6.9 CM
ECHO LA MINOR AXIS: 5.6 CM
ECHO LA TO AORTIC ROOT RATIO: 0.81
ECHO LA VOL 2C: 100 ML (ref 22–52)
ECHO LA VOL 4C: 35 ML (ref 22–52)
ECHO LA VOL BP: 65 ML (ref 22–52)
ECHO LA VOL/BSA BIPLANE: 33 ML/M2 (ref 16–34)
ECHO LA VOLUME INDEX A2C: 51 ML/M2 (ref 16–34)
ECHO LA VOLUME INDEX A4C: 18 ML/M2 (ref 16–34)
ECHO LV E' LATERAL VELOCITY: 10 CM/S
ECHO LV E' SEPTAL VELOCITY: 7 CM/S
ECHO LV EDV A2C: 70 ML
ECHO LV EDV A4C: 82 ML
ECHO LV EDV INDEX A4C: 41 ML/M2
ECHO LV EDV NDEX A2C: 35 ML/M2
ECHO LV EJECTION FRACTION A2C: 39 %
ECHO LV EJECTION FRACTION A4C: 46 %
ECHO LV EJECTION FRACTION BIPLANE: 43 % (ref 55–100)
ECHO LV ESV A2C: 43 ML
ECHO LV ESV A4C: 45 ML
ECHO LV ESV INDEX A2C: 22 ML/M2
ECHO LV ESV INDEX A4C: 23 ML/M2
ECHO LV FRACTIONAL SHORTENING: 38 % (ref 28–44)
ECHO LV INTERNAL DIMENSION DIASTOLE INDEX: 2.63 CM/M2
ECHO LV INTERNAL DIMENSION DIASTOLIC: 5.2 CM (ref 3.9–5.3)
ECHO LV INTERNAL DIMENSION SYSTOLIC INDEX: 1.62 CM/M2
ECHO LV INTERNAL DIMENSION SYSTOLIC: 3.2 CM
ECHO LV IVSD: 1 CM (ref 0.6–0.9)
ECHO LV MASS 2D: 181.4 G (ref 67–162)
ECHO LV MASS INDEX 2D: 91.6 G/M2 (ref 43–95)
ECHO LV POSTERIOR WALL DIASTOLIC: 0.9 CM (ref 0.6–0.9)
ECHO LV RELATIVE WALL THICKNESS RATIO: 0.35
ECHO LVOT AREA: 3.1 CM2
ECHO LVOT AV VTI INDEX: 0.81
ECHO LVOT DIAM: 2 CM
ECHO LVOT MEAN GRADIENT: 3 MMHG
ECHO LVOT PEAK GRADIENT: 8 MMHG
ECHO LVOT PEAK VELOCITY: 1.4 M/S
ECHO LVOT STROKE VOLUME INDEX: 48.7 ML/M2
ECHO LVOT SV: 96.4 ML
ECHO LVOT VTI: 30.7 CM
ECHO MV A VELOCITY: 0.94 M/S
ECHO MV E DECELERATION TIME (DT): 241 MS
ECHO MV E VELOCITY: 0.86 M/S
ECHO MV E/A RATIO: 0.91
ECHO MV E/E' LATERAL: 8.6
ECHO MV E/E' RATIO (AVERAGED): 10.44
ECHO MV E/E' SEPTAL: 12.29
ECHO PV MAX VELOCITY: 1 M/S
ECHO PV PEAK GRADIENT: 4 MMHG
ECHO RIGHT VENTRICULAR SYSTOLIC PRESSURE (RVSP): 36 MMHG
ECHO RV BASAL DIMENSION: 4.2 CM
ECHO RV FREE WALL PEAK S': 18 CM/S
ECHO TV REGURGITANT MAX VELOCITY: 2.87 M/S
ECHO TV REGURGITANT PEAK GRADIENT: 33 MMHG
EOSINOPHIL # BLD: 0.3 K/UL (ref 0–0.8)
EOSINOPHIL NFR BLD: 3 % (ref 0.5–7.8)
ERYTHROCYTE [DISTWIDTH] IN BLOOD BY AUTOMATED COUNT: 18.1 % (ref 11.9–14.6)
FLUAV SUBTYP SPEC NAA+PROBE: NOT DETECTED
FLUBV RNA SPEC QL NAA+PROBE: NOT DETECTED
GLOBULIN SER CALC-MCNC: 3.6 G/DL (ref 2.8–4.5)
GLUCOSE SERPL-MCNC: 102 MG/DL (ref 65–100)
HADV DNA SPEC QL NAA+PROBE: NOT DETECTED
HCOV 229E RNA SPEC QL NAA+PROBE: NOT DETECTED
HCOV HKU1 RNA SPEC QL NAA+PROBE: NOT DETECTED
HCOV NL63 RNA SPEC QL NAA+PROBE: NOT DETECTED
HCOV OC43 RNA SPEC QL NAA+PROBE: NOT DETECTED
HCT VFR BLD AUTO: 26.2 % (ref 35.8–46.3)
HGB BLD-MCNC: 7.8 G/DL (ref 11.7–15.4)
HMPV RNA SPEC QL NAA+PROBE: NOT DETECTED
HPIV1 RNA SPEC QL NAA+PROBE: NOT DETECTED
HPIV2 RNA SPEC QL NAA+PROBE: NOT DETECTED
HPIV3 RNA SPEC QL NAA+PROBE: NOT DETECTED
HPIV4 RNA SPEC QL NAA+PROBE: NOT DETECTED
IMM GRANULOCYTES # BLD AUTO: 0.1 K/UL (ref 0–0.5)
IMM GRANULOCYTES NFR BLD AUTO: 1 % (ref 0–5)
IRON SERPL-MCNC: 93 UG/DL (ref 35–150)
LYMPHOCYTES # BLD: 1.1 K/UL (ref 0.5–4.6)
LYMPHOCYTES NFR BLD: 12 % (ref 13–44)
M PNEUMO DNA SPEC QL NAA+PROBE: NOT DETECTED
MAGNESIUM SERPL-MCNC: 1.6 MG/DL (ref 1.8–2.4)
MAGNESIUM SERPL-MCNC: 2.2 MG/DL (ref 1.8–2.4)
MCH RBC QN AUTO: 28 PG (ref 26.1–32.9)
MCHC RBC AUTO-ENTMCNC: 29.8 G/DL (ref 31.4–35)
MCV RBC AUTO: 93.9 FL (ref 82–102)
MONOCYTES # BLD: 0.5 K/UL (ref 0.1–1.3)
MONOCYTES NFR BLD: 5 % (ref 4–12)
NEUTS SEG # BLD: 7 K/UL (ref 1.7–8.2)
NEUTS SEG NFR BLD: 79 % (ref 43–78)
NRBC # BLD: 0 K/UL (ref 0–0.2)
NT PRO BNP: 2755 PG/ML
PLATELET # BLD AUTO: 256 K/UL (ref 150–450)
PMV BLD AUTO: 9.7 FL (ref 9.4–12.3)
POTASSIUM SERPL-SCNC: 3 MMOL/L (ref 3.5–5.1)
POTASSIUM SERPL-SCNC: 3.5 MMOL/L (ref 3.5–5.1)
PROT SERPL-MCNC: 6.1 G/DL (ref 6.3–8.2)
RBC # BLD AUTO: 2.79 M/UL (ref 4.05–5.2)
RSV RNA SPEC QL NAA+PROBE: NOT DETECTED
RV+EV RNA SPEC QL NAA+PROBE: NOT DETECTED
SARS-COV-2 RNA RESP QL NAA+PROBE: NOT DETECTED
SODIUM SERPL-SCNC: 139 MMOL/L (ref 133–143)
WBC # BLD AUTO: 9 K/UL (ref 4.3–11.1)

## 2022-12-04 PROCEDURE — 2700000000 HC OXYGEN THERAPY PER DAY

## 2022-12-04 PROCEDURE — 0202U NFCT DS 22 TRGT SARS-COV-2: CPT

## 2022-12-04 PROCEDURE — 2580000003 HC RX 258: Performed by: EMERGENCY MEDICINE

## 2022-12-04 PROCEDURE — 80053 COMPREHEN METABOLIC PANEL: CPT

## 2022-12-04 PROCEDURE — 83735 ASSAY OF MAGNESIUM: CPT

## 2022-12-04 PROCEDURE — 93306 TTE W/DOPPLER COMPLETE: CPT

## 2022-12-04 PROCEDURE — 83540 ASSAY OF IRON: CPT

## 2022-12-04 PROCEDURE — 6360000002 HC RX W HCPCS: Performed by: FAMILY MEDICINE

## 2022-12-04 PROCEDURE — 2580000003 HC RX 258: Performed by: FAMILY MEDICINE

## 2022-12-04 PROCEDURE — 99223 1ST HOSP IP/OBS HIGH 75: CPT | Performed by: INTERNAL MEDICINE

## 2022-12-04 PROCEDURE — 6370000000 HC RX 637 (ALT 250 FOR IP): Performed by: FAMILY MEDICINE

## 2022-12-04 PROCEDURE — 93306 TTE W/DOPPLER COMPLETE: CPT | Performed by: INTERNAL MEDICINE

## 2022-12-04 PROCEDURE — 84132 ASSAY OF SERUM POTASSIUM: CPT

## 2022-12-04 PROCEDURE — 94660 CPAP INITIATION&MGMT: CPT

## 2022-12-04 PROCEDURE — 36415 COLL VENOUS BLD VENIPUNCTURE: CPT

## 2022-12-04 PROCEDURE — 6360000002 HC RX W HCPCS: Performed by: INTERNAL MEDICINE

## 2022-12-04 PROCEDURE — 99233 SBSQ HOSP IP/OBS HIGH 50: CPT | Performed by: INTERNAL MEDICINE

## 2022-12-04 PROCEDURE — 85025 COMPLETE CBC W/AUTO DIFF WBC: CPT

## 2022-12-04 PROCEDURE — 6370000000 HC RX 637 (ALT 250 FOR IP): Performed by: EMERGENCY MEDICINE

## 2022-12-04 PROCEDURE — 2580000003 HC RX 258: Performed by: INTERNAL MEDICINE

## 2022-12-04 PROCEDURE — 2000000000 HC ICU R&B

## 2022-12-04 PROCEDURE — 83880 ASSAY OF NATRIURETIC PEPTIDE: CPT

## 2022-12-04 PROCEDURE — 97161 PT EVAL LOW COMPLEX 20 MIN: CPT

## 2022-12-04 PROCEDURE — 94640 AIRWAY INHALATION TREATMENT: CPT

## 2022-12-04 PROCEDURE — 6360000002 HC RX W HCPCS: Performed by: EMERGENCY MEDICINE

## 2022-12-04 PROCEDURE — 71045 X-RAY EXAM CHEST 1 VIEW: CPT

## 2022-12-04 PROCEDURE — 97530 THERAPEUTIC ACTIVITIES: CPT

## 2022-12-04 PROCEDURE — 6370000000 HC RX 637 (ALT 250 FOR IP): Performed by: INTERNAL MEDICINE

## 2022-12-04 PROCEDURE — 2500000003 HC RX 250 WO HCPCS: Performed by: INTERNAL MEDICINE

## 2022-12-04 RX ORDER — METOPROLOL SUCCINATE 50 MG/1
50 TABLET, EXTENDED RELEASE ORAL DAILY
Status: DISCONTINUED | OUTPATIENT
Start: 2022-12-04 | End: 2022-12-04

## 2022-12-04 RX ORDER — MAGNESIUM SULFATE IN WATER 40 MG/ML
2000 INJECTION, SOLUTION INTRAVENOUS PRN
Status: DISCONTINUED | OUTPATIENT
Start: 2022-12-04 | End: 2022-12-09 | Stop reason: HOSPADM

## 2022-12-04 RX ORDER — POTASSIUM CHLORIDE 20 MEQ/1
40 TABLET, EXTENDED RELEASE ORAL 2 TIMES DAILY WITH MEALS
Status: DISPENSED | OUTPATIENT
Start: 2022-12-04 | End: 2022-12-05

## 2022-12-04 RX ORDER — POTASSIUM CHLORIDE 7.45 MG/ML
10 INJECTION INTRAVENOUS PRN
Status: DISCONTINUED | OUTPATIENT
Start: 2022-12-04 | End: 2022-12-04

## 2022-12-04 RX ORDER — POTASSIUM CHLORIDE 20 MEQ/1
40 TABLET, EXTENDED RELEASE ORAL PRN
Status: DISCONTINUED | OUTPATIENT
Start: 2022-12-04 | End: 2022-12-04

## 2022-12-04 RX ORDER — FUROSEMIDE 40 MG/1
40 TABLET ORAL DAILY PRN
Status: ON HOLD | COMMUNITY
Start: 2022-11-29 | End: 2022-12-09 | Stop reason: HOSPADM

## 2022-12-04 RX ORDER — MAGNESIUM SULFATE IN WATER 40 MG/ML
2000 INJECTION, SOLUTION INTRAVENOUS ONCE
Status: DISCONTINUED | OUTPATIENT
Start: 2022-12-04 | End: 2022-12-09 | Stop reason: HOSPADM

## 2022-12-04 RX ADMIN — POTASSIUM CHLORIDE 40 MEQ: 1500 TABLET, EXTENDED RELEASE ORAL at 15:44

## 2022-12-04 RX ADMIN — HYDROCODONE BITARTRATE AND ACETAMINOPHEN 1 TABLET: 7.5; 325 TABLET ORAL at 15:44

## 2022-12-04 RX ADMIN — SODIUM CHLORIDE, PRESERVATIVE FREE 10 ML: 5 INJECTION INTRAVENOUS at 08:49

## 2022-12-04 RX ADMIN — AMITRIPTYLINE HYDROCHLORIDE 25 MG: 25 TABLET, FILM COATED ORAL at 20:27

## 2022-12-04 RX ADMIN — FLUOXETINE 60 MG: 20 CAPSULE ORAL at 08:49

## 2022-12-04 RX ADMIN — POTASSIUM CHLORIDE 40 MEQ: 1500 TABLET, EXTENDED RELEASE ORAL at 06:08

## 2022-12-04 RX ADMIN — FERROUS SULFATE TAB 325 MG (65 MG ELEMENTAL FE) 325 MG: 325 (65 FE) TAB at 08:48

## 2022-12-04 RX ADMIN — AMITRIPTYLINE HYDROCHLORIDE 25 MG: 25 TABLET, FILM COATED ORAL at 00:49

## 2022-12-04 RX ADMIN — APIXABAN 5 MG: 5 TABLET, FILM COATED ORAL at 08:48

## 2022-12-04 RX ADMIN — FLECAINIDE ACETATE 75 MG: 50 TABLET ORAL at 20:27

## 2022-12-04 RX ADMIN — HYDROCODONE BITARTRATE AND ACETAMINOPHEN 1 TABLET: 7.5; 325 TABLET ORAL at 22:22

## 2022-12-04 RX ADMIN — MAGNESIUM SULFATE HEPTAHYDRATE 2000 MG: 40 INJECTION, SOLUTION INTRAVENOUS at 08:50

## 2022-12-04 RX ADMIN — PANTOPRAZOLE SODIUM 40 MG: 40 TABLET, DELAYED RELEASE ORAL at 06:08

## 2022-12-04 RX ADMIN — HYDROCODONE BITARTRATE AND ACETAMINOPHEN 1 TABLET: 7.5; 325 TABLET ORAL at 09:23

## 2022-12-04 RX ADMIN — APIXABAN 5 MG: 5 TABLET, FILM COATED ORAL at 20:30

## 2022-12-04 RX ADMIN — SODIUM CHLORIDE, PRESERVATIVE FREE 5 ML: 5 INJECTION INTRAVENOUS at 08:49

## 2022-12-04 RX ADMIN — METOPROLOL SUCCINATE 50 MG: 50 TABLET, EXTENDED RELEASE ORAL at 08:48

## 2022-12-04 RX ADMIN — TIMOLOL MALEATE 1 DROP: 5 SOLUTION OPHTHALMIC at 09:00

## 2022-12-04 RX ADMIN — DOXYCYCLINE 100 MG: 100 INJECTION, POWDER, LYOPHILIZED, FOR SOLUTION INTRAVENOUS at 22:22

## 2022-12-04 RX ADMIN — SODIUM CHLORIDE, PRESERVATIVE FREE 10 ML: 5 INJECTION INTRAVENOUS at 20:28

## 2022-12-04 RX ADMIN — FUROSEMIDE 40 MG: 10 INJECTION, SOLUTION INTRAMUSCULAR; INTRAVENOUS at 08:47

## 2022-12-04 RX ADMIN — HYDROCODONE BITARTRATE AND ACETAMINOPHEN 1 TABLET: 7.5; 325 TABLET ORAL at 04:30

## 2022-12-04 RX ADMIN — FLECAINIDE ACETATE 75 MG: 50 TABLET ORAL at 08:48

## 2022-12-04 RX ADMIN — MOMETASONE FUROATE AND FORMOTEROL FUMARATE DIHYDRATE 2 PUFF: 200; 5 AEROSOL RESPIRATORY (INHALATION) at 19:25

## 2022-12-04 RX ADMIN — LEVOTHYROXINE SODIUM 50 MCG: 0.05 TABLET ORAL at 06:08

## 2022-12-04 RX ADMIN — FUROSEMIDE 40 MG: 10 INJECTION, SOLUTION INTRAMUSCULAR; INTRAVENOUS at 17:41

## 2022-12-04 RX ADMIN — SODIUM CHLORIDE 125 MG: 9 INJECTION, SOLUTION INTRAVENOUS at 10:46

## 2022-12-04 RX ADMIN — SODIUM CHLORIDE, PRESERVATIVE FREE 5 ML: 5 INJECTION INTRAVENOUS at 15:03

## 2022-12-04 RX ADMIN — SENNOSIDES AND DOCUSATE SODIUM 2 TABLET: 8.6; 5 TABLET ORAL at 20:28

## 2022-12-04 RX ADMIN — FLECAINIDE ACETATE 75 MG: 50 TABLET ORAL at 00:48

## 2022-12-04 RX ADMIN — DOXYCYCLINE 100 MG: 100 INJECTION, POWDER, LYOPHILIZED, FOR SOLUTION INTRAVENOUS at 11:47

## 2022-12-04 ASSESSMENT — PAIN DESCRIPTION - PAIN TYPE
TYPE: CHRONIC PAIN

## 2022-12-04 ASSESSMENT — PAIN SCALES - GENERAL
PAINLEVEL_OUTOF10: 0
PAINLEVEL_OUTOF10: 5
PAINLEVEL_OUTOF10: 3
PAINLEVEL_OUTOF10: 5
PAINLEVEL_OUTOF10: 6

## 2022-12-04 ASSESSMENT — PAIN DESCRIPTION - LOCATION
LOCATION: BACK;LEG
LOCATION: BACK;LEG
LOCATION: BACK;LEG;ARM
LOCATION: BACK;LEG
LOCATION: BACK;HIP;LEG

## 2022-12-04 ASSESSMENT — PAIN DESCRIPTION - DESCRIPTORS
DESCRIPTORS: DISCOMFORT
DESCRIPTORS: ACHING
DESCRIPTORS: ACHING

## 2022-12-04 ASSESSMENT — PAIN - FUNCTIONAL ASSESSMENT: PAIN_FUNCTIONAL_ASSESSMENT: ACTIVITIES ARE NOT PREVENTED

## 2022-12-04 ASSESSMENT — PAIN DESCRIPTION - ORIENTATION: ORIENTATION: POSTERIOR

## 2022-12-04 NOTE — PROGRESS NOTES
Hospitalist Progress Note   Admit Date:  12/3/2022  2:56 PM   Name:  Mitul Naqvi   Age:  82 y.o. Sex:  female  :  1935   MRN:  [id number]   Room:  [facility]/    Reason(s) for Admission: Acute respiratory failure ([facility] [J96.00]  Acute congestive heart failure, unspecified heart failure type [facility]) [I50.9]     Hospital Course & Interval History:   Mrs. Jhon Martinez is an 81 y/o female with a h/o atrial fibrillation, hypothyroidism, bradycardia with PPM, fibromyalgia and GERD who was admitted to our service on 12/3 with acute hypoxemic respiratory failure felt due to atypical pneumonia vs pulmonary edema. She had a normal TTE In 2022. She was started on CPAP, IV Lasix and a nitroglycerin drip and admitted to ICU. She was seen by Cardiology. Pulmonology consulted. Subjective/24hr Events (22): Feels much better this morning. On CPAP, weaning nitro drip, BP is controlled. Net neg 1.5L recorded. No chest pain, N/V/D. Assessment & Plan:   # Acute hypoxemic respiratory failure   - RA sats reportedly in the 50s on arrival. Tolerating CPAP currently with plans to try and wean to Airvo. CXR atypical infiltrates vs pulmonary edema. Had normal TTE this past August. Diuresing, net neg 1.5L, appreciate Cardiology. Repeat CXR  AM appears the same. Will recheck BNP and respiratory viral panel, add doxycycline for atypical coverage. Weaning nitro. # HypoK // hypoMg   - Replace both     # Chronic normocytic anemia // iron def anemia   - Hb 9.8 in October, 8.6 earlier this month, 7.8 today . IV iron ordered. Tsat 9 c/w ERICK. # GERD   - PPI    # Atrial fibrillation // bradycardia s/p PPM   - Eliquis, flecainide, metoprolol    # MDD // anxiety   - Prozac    # Hypothyroidism   - Synthroid    Discharge Planning: Home when. To floor later today v tomorrow pending O2 needs.   Diet:  Diet NPO Exceptions are: Sips of Water with Meds  DVT PPx: Eliquis  Code status: Full Code    Hospital Problems             Last Modified POA    * (Principal) Acute respiratory failure ([facility] 12/3/2022 Yes    Pacemaker 12/3/2022 Yes    Anemia, unspecified 12/3/2022 Yes    Hypokalemia 12/3/2022 Yes    Anticoagulant long-term use 12/3/2022 Yes    Paroxysmal atrial fibrillation ([facility] 12/3/2022 Yes    Overview Signed 3/19/2022 10:31 AM by Xavi, Convprob     Rare, fleeting palp in evening, nothing sustained.  No bleeding prob           HTN (hypertension), benign 12/3/2022 Yes    Long term (current) use of anticoagulants 12/3/2022 Yes    Bradycardia 12/3/2022 Yes    Overview Signed 8/16/2022  2:25 PM by Jansi Higuera MD     Added automatically from request for surgery [id number]            Objective:   Patient Vitals for the past 24 hrs:   Temp Pulse Resp BP SpO2   12/04/22 0718 -- 64 30 131/60 94 %   12/04/22 0703 -- 62 27 127/62 93 %   12/04/22 0700 -- 64 28 -- 94 %   12/04/22 0631 -- 64 30 119/60 94 %   12/04/22 0616 -- 67 27 (!) 114/55 92 %   12/04/22 0601 -- 71 -- 132/60 93 %   12/04/22 0546 -- 67 -- 127/60 93 %   12/04/22 0531 -- 69 -- 134/63 93 %   12/04/22 0516 -- 69 -- 139/63 92 %   12/04/22 0501 -- 78 -- (!) 151/67 93 %   12/04/22 0446 -- 67 (!) 36 (!) 160/70 94 %   12/04/22 0416 -- 66 (!) 38 (!) 154/70 95 %   12/04/22 0401 -- 65 -- (!) 162/72 95 %   12/04/22 0346 -- (!) 128 -- (!) 158/89 98 %   12/04/22 0342 -- -- (!) 39 -- --   12/04/22 0331 -- 67 13 (!) 161/70 97 %   12/04/22 0316 -- 60 (!) 35 128/61 95 %   12/04/22 0301 98.2 °F (36.8 °C) 60 (!) 31 125/75 94 %   12/04/22 0246 -- 60 (!) 38 120/79 94 %   12/04/22 0231 -- 60 23 125/61 93 %   12/04/22 0216 -- 60 (!) 35 (!) 128/58 95 %   12/04/22 0201 -- 62 (!) 31 (!) 141/63 97 %   12/04/22 0146 -- 65 29 135/64 94 %   12/04/22 0131 -- 70 28 136/60 94 %   12/04/22 0116 -- 63 27 126/60 95 %   12/04/22 0101 -- 62 27 (!) 124/57 95 %   12/04/22 0046 -- 60 30 133/64 95 %   12/04/22 0031 -- 60 22 138/63 95 %   12/04/22 0016 -- 68 25 (!) 129/58 95 %   12/04/22 0001 -- 69 26 133/61 94 %   12/03/22 2346 -- 75 (!) 33 (!) 140/64 94 %   12/03/22 2331 -- 65 (!) 39 (!) 122/57 94 %   12/03/22 2326 98.6 °F (37 °C) 66 29 133/61 93 %   12/03/22 2322 -- -- 29 -- --   12/03/22 2226 -- 65 (!) 34 (!) 138/55 94 %   12/03/22 2211 -- 78 (!) 32 (!) 151/59 94 %   12/03/22 2125 -- 70 (!) 31 (!) 144/62 95 %   12/03/22 2045 -- 68 22 (!) 142/60 94 %   12/03/22 2027 -- -- 30 -- --   12/03/22 1948 -- 69 29 (!) 141/53 95 %   12/03/22 1858 -- 71 21 (!) 159/66 96 %   12/03/22 1830 -- 70 20 (!) 142/58 94 %   12/03/22 1815 -- 70 21 (!) 144/63 96 %   12/03/22 1800 -- 70 22 (!) 147/60 95 %   12/03/22 1745 -- 71 12 (!) 149/55 95 %   12/03/22 1730 -- 70 16 (!) 154/129 96 %   12/03/22 1715 -- 71 21 (!) 149/57 96 %   12/03/22 1700 -- 70 18 (!) 142/50 94 %   12/03/22 1645 -- 69 23 (!) 153/58 96 %   12/03/22 1630 -- 70 22 (!) 149/54 95 %   12/03/22 1615 -- 72 20 (!) 147/60 97 %   12/03/22 1600 -- 71 30 (!) 141/55 97 %   12/03/22 1545 -- 70 21 (!) 140/57 97 %   12/03/22 1530 -- 81 24 (!) 148/54 97 %   12/03/22 1515 -- 80 28 (!) 162/79 92 %   12/03/22 1500 -- 69 (!) 32 -- 93 %   12/03/22 1457 98.1 °F (36.7 °C) -- -- -- --   12/03/22 1452 98.7 °F (37.1 °C) 77 20 134/60 (!) 53 %       Estimated body mass index is 31.6 kg/m² as calculated from the following:    Height as of this encounter: 5' 6\" (1.676 m). Weight as of this encounter: 195 lb 12.3 oz (88.8 kg). Intake/Output Summary (Last 24 hours) at 12/4/2022 0720  Last data filed at 12/4/2022 0641  Gross per 24 hour   Intake 20.68 ml   Output 1550 ml   Net -1529.32 ml         Physical Exam:   Blood pressure 131/60, pulse 64, temperature 98.2 °F (36.8 °C), temperature source Axillary, resp. rate 30, height 5' 6\" (1.676 m), weight 195 lb 12.3 oz (88.8 kg), SpO2 94 %. General:    Well nourished. No overt distress. Awake, oriented, on CPAP. Head:  Normocephalic, atraumatic  Eyes:  Sclerae appear normal. Pupils equally round. ENT:  Nares appear normal, no drainage.  Moist oral mucosa  Neck:  No restricted ROM. Trachea midline. CV:   RRR. No m/r/g. No jugular venous distension. Lungs:   Bilateral rales, on CPAP 50% FiO2, no wheezes or rhonchi. Abdomen: Bowel sounds present. Soft, nontender, nondistended. Extremities: No cyanosis or clubbing. No edema. Skin:     No rashes and normal coloration. Warm and dry. Neuro:  CN II-XII grossly intact. Sensation intact. A&Ox3. Psych:  Normal mood and affect.       I have reviewed ordered lab tests and independently visualized imaging below:    Recent Labs:  Recent Results (from the past 48 hour(s))   CBC with Auto Differential    Collection Time: 12/03/22  3:06 PM   Result Value Ref Range    WBC 9.7 4.3 - 11.1 K/uL    RBC 2.84 (L) 4.05 - 5.2 M/uL    Hemoglobin 8.1 (L) 11.7 - 15.4 g/dL    Hematocrit 26.6 (L) 35.8 - 46.3 %    MCV 93.7 82 - 102 FL    MCH 28.5 26.1 - 32.9 PG    MCHC 30.5 (L) 31.4 - 35.0 g/dL    RDW 17.8 (H) 11.9 - 14.6 %    Platelets 589 925 - 165 K/uL    MPV 9.5 9.4 - 12.3 FL    nRBC 0.00 0.0 - 0.2 K/uL    Differential Type AUTOMATED      Seg Neutrophils 77 43 - 78 %    Lymphocytes 10 (L) 13 - 44 %    Monocytes 6 4.0 - 12.0 %    Eosinophils % 6 0.5 - 7.8 %    Basophils 0 0.0 - 2.0 %    Immature Granulocytes 1 0.0 - 5.0 %    Segs Absolute 7.6 1.7 - 8.2 K/UL    Absolute Lymph # 0.9 0.5 - 4.6 K/UL    Absolute Mono # 0.6 0.1 - 1.3 K/UL    Absolute Eos # 0.6 0.0 - 0.8 K/UL    Basophils Absolute 0.0 0.0 - 0.2 K/UL    Absolute Immature Granulocyte 0.1 0.0 - 0.5 K/UL   Comprehensive Metabolic Panel    Collection Time: 12/03/22  3:06 PM   Result Value Ref Range    Sodium 139 133 - 143 mmol/L    Potassium 3.2 (L) 3.5 - 5.1 mmol/L    Chloride 103 101 - 110 mmol/L    CO2 31 21 - 32 mmol/L    Anion Gap 5 2 - 11 mmol/L    Glucose 128 (H) 65 - 100 mg/dL    BUN 20 8 - 23 MG/DL    Creatinine 0.80 0.6 - 1.0 MG/DL    Est, Glom Filt Rate >60 >60 ml/min/1.73m2    Calcium 9.3 8.3 - 10.4 MG/DL    Total Bilirubin 1.0 0.2 - 1.1 MG/DL    ALT 29 12 - 65 U/L    AST 40 (H) 15 - 37 U/L    Alk Phosphatase 128 50 - 136 U/L    Total Protein 6.3 6.3 - 8.2 g/dL    Albumin 2.7 (L) 3.2 - 4.6 g/dL    Globulin 3.6 2.8 - 4.5 g/dL    Albumin/Globulin Ratio 0.8 0.4 - 1.6     Magnesium    Collection Time: 12/03/22  3:06 PM   Result Value Ref Range    Magnesium 2.0 1.8 - 2.4 mg/dL   Troponin    Collection Time: 12/03/22  3:06 PM   Result Value Ref Range    Troponin, High Sensitivity 14.6 (H) 0 - 14 pg/mL   Lactic Acid    Collection Time: 12/03/22  3:06 PM   Result Value Ref Range    Lactic Acid, Plasma 1.4 0.4 - 2.0 MMOL/L   Brain Natriuretic Peptide    Collection Time: 12/03/22  3:06 PM   Result Value Ref Range    NT Pro-BNP 2,517 (H) <450 PG/ML   Transferrin Saturation    Collection Time: 12/03/22  3:06 PM   Result Value Ref Range    Iron 20 (L) 35 - 150 ug/dL    TIBC 206 (L) 250 - 450 ug/dL    TRANSFERRIN SATURATION 10 (L) >20 %   Procalcitonin    Collection Time: 12/03/22  3:06 PM   Result Value Ref Range    Procalcitonin 0.11 0.00 - 0.49 ng/mL   Arterial Blood Gas, POC    Collection Time: 12/03/22  3:13 PM   Result Value Ref Range    DEVICE Non rebreather      FIO2 100 %    pH, Arterial, POC 7.46 (H) 7.35 - 7.45      pCO2, Arterial, POC 39.5 35 - 45 MMHG    pO2, Arterial,  (H) 75 - 100 MMHG    HCO3, Mixed 27.8 (H) 22 - 26 MMOL/L    SO2c, Arterial, POC 99.0 (H) 95 - 98 %    Base Excess 3.7 mmol/L    POC Thomas's Test Positive      Site LEFT RADIAL      Specimen type: ARTERIAL      Performed by: Adame (Wallace)    EKG 12 Lead    Collection Time: 12/03/22  3:19 PM   Result Value Ref Range    Ventricular Rate 72 BPM    Atrial Rate 73 BPM    P-R Interval 204 ms    QRS Duration 94 ms    Q-T Interval 409 ms    QTc Calculation (Bazett) 448 ms    R Axis 27 degrees    T Axis 13 degrees    Diagnosis Atrial-paced rhythm    Troponin    Collection Time: 12/03/22  5:16 PM   Result Value Ref Range    Troponin, High Sensitivity 16.3 (H) 0 - 14 pg/mL   COVID-19, Rapid Collection Time: 12/03/22  6:34 PM    Specimen: Nasopharyngeal   Result Value Ref Range    Source NASAL      SARS-CoV-2, Rapid Not detected NOTD     Comprehensive Metabolic Panel w/ Reflex to MG    Collection Time: 12/04/22  3:46 AM   Result Value Ref Range    Sodium 139 133 - 143 mmol/L    Potassium 3.0 (L) 3.5 - 5.1 mmol/L    Chloride 99 (L) 101 - 110 mmol/L    CO2 35 (H) 21 - 32 mmol/L    Anion Gap 5 2 - 11 mmol/L    Glucose 102 (H) 65 - 100 mg/dL    BUN 17 8 - 23 MG/DL    Creatinine 0.90 0.6 - 1.0 MG/DL    Est, Glom Filt Rate >60 >60 ml/min/1.73m2    Calcium 9.2 8.3 - 10.4 MG/DL    Total Bilirubin 1.2 (H) 0.2 - 1.1 MG/DL    ALT 26 12 - 65 U/L    AST 41 (H) 15 - 37 U/L    Alk Phosphatase 133 50 - 136 U/L    Total Protein 6.1 (L) 6.3 - 8.2 g/dL    Albumin 2.5 (L) 3.2 - 4.6 g/dL    Globulin 3.6 2.8 - 4.5 g/dL    Albumin/Globulin Ratio 0.7 0.4 - 1.6     CBC with Auto Differential    Collection Time: 12/04/22  3:46 AM   Result Value Ref Range    WBC 9.0 4.3 - 11.1 K/uL    RBC 2.79 (L) 4.05 - 5.2 M/uL    Hemoglobin 7.8 (L) 11.7 - 15.4 g/dL    Hematocrit 26.2 (L) 35.8 - 46.3 %    MCV 93.9 82 - 102 FL    MCH 28.0 26.1 - 32.9 PG    MCHC 29.8 (L) 31.4 - 35.0 g/dL    RDW 18.1 (H) 11.9 - 14.6 %    Platelets 101 090 - 541 K/uL    MPV 9.7 9.4 - 12.3 FL    nRBC 0.00 0.0 - 0.2 K/uL    Differential Type AUTOMATED      Seg Neutrophils 79 (H) 43 - 78 %    Lymphocytes 12 (L) 13 - 44 %    Monocytes 5 4.0 - 12.0 %    Eosinophils % 3 0.5 - 7.8 %    Basophils 0 0.0 - 2.0 %    Immature Granulocytes 1 0.0 - 5.0 %    Segs Absolute 7.0 1.7 - 8.2 K/UL    Absolute Lymph # 1.1 0.5 - 4.6 K/UL    Absolute Mono # 0.5 0.1 - 1.3 K/UL    Absolute Eos # 0.3 0.0 - 0.8 K/UL    Basophils Absolute 0.0 0.0 - 0.2 K/UL    Absolute Immature Granulocyte 0.1 0.0 - 0.5 K/UL   Magnesium    Collection Time: 12/04/22  3:46 AM   Result Value Ref Range    Magnesium 1.6 (L) 1.8 - 2.4 mg/dL         Other Studies:  XR CHEST PORTABLE    Result Date: 12/3/2022  Portable chest x-ray CLINICAL INDICATION: Shortness of breath FINDINGS: Single AP view of the chest compared to a similar exam dated 11/17/2022 shows worsening bilateral reticular nodular densities throughout the mid and lower lungs. A pacer device in place. There is no pleural effusion. There is no acute infarct. Worsening bilateral pulmonary edema or atypical pneumonia pattern.       Current Meds:  Current Facility-Administered Medications   Medication Dose Route Frequency    potassium chloride (KLOR-CON M) extended release tablet 40 mEq  40 mEq Oral PRN    Or    potassium bicarb-citric acid (EFFER-K) effervescent tablet 40 mEq  40 mEq Oral PRN    Or    potassium chloride 10 mEq/100 mL IVPB (Peripheral Line)  10 mEq IntraVENous PRN    magnesium sulfate 2000 mg in 50 mL IVPB premix  2,000 mg IntraVENous PRN    sodium chloride flush 0.9 % injection 5 mL  5 mL IntraVENous Q8H    sodium chloride flush 0.9 % injection 10 mL  10 mL IntraVENous PRN    amitriptyline (ELAVIL) tablet 25 mg  25 mg Oral Nightly    diclofenac sodium (VOLTAREN) 1 % gel 1 g  1 g Topical 4x Daily PRN    apixaban (ELIQUIS) tablet 5 mg  5 mg Oral BID    ferrous sulfate (IRON 325) tablet 325 mg  325 mg Oral Daily    flecainide (TAMBOCOR) tablet 75 mg  75 mg Oral BID    FLUoxetine (PROZAC) capsule 60 mg  60 mg Oral Daily    hydrOXYzine HCl (ATARAX) tablet 25 mg  25 mg Oral Nightly PRN    levothyroxine (SYNTHROID) tablet 50 mcg  50 mcg Oral QAM AC    metoprolol succinate (TOPROL XL) extended release tablet 50 mg  50 mg Oral Daily    pantoprazole (PROTONIX) tablet 40 mg  40 mg Oral QAM AC    potassium chloride (KLOR-CON M) extended release tablet 10 mEq  10 mEq Oral Nightly    timolol (TIMOPTIC) 0.5 % ophthalmic solution 1 drop  1 drop Both Eyes Daily    mometasone-formoterol (DULERA) 200-5 MCG/ACT inhaler 2 puff  2 puff Inhalation BID    LORazepam (ATIVAN) tablet 1 mg  1 mg Oral Q12H PRN    HYDROcodone-acetaminophen (NORCO) 7.5-325 MG per tablet 1 tablet  1 tablet Oral Q6H PRN    sennosides-docusate sodium (SENOKOT-S) 8.6-50 MG tablet 2 tablet  2 tablet Oral Nightly    furosemide (LASIX) injection 40 mg  40 mg IntraVENous BID    albuterol (PROVENTIL) nebulizer solution 2.5 mg  2.5 mg Nebulization Q8H PRN    nitroGLYCERIN 50 mg in dextrose 5% 250 mL infusion  5-200 mcg/min IntraVENous Continuous    sodium chloride flush 0.9 % injection 5-40 mL  5-40 mL IntraVENous 2 times per day    sodium chloride flush 0.9 % injection 5-40 mL  5-40 mL IntraVENous PRN    0.9 % sodium chloride infusion   IntraVENous PRN    polyethylene glycol (GLYCOLAX) packet 17 g  17 g Oral Daily PRN    acetaminophen (TYLENOL) tablet 650 mg  650 mg Oral Q6H PRN    Or    acetaminophen (TYLENOL) suppository 650 mg  650 mg Rectal Q6H PRN    tuberculin injection 5 Units  5 Units IntraDERmal Once       Signed:  Ellis Hernández MD    Part of this note may have been written by using a voice dictation software. The note has been proof read but may still contain some grammatical/other typographical errors.

## 2022-12-04 NOTE — CONSULTS
History and Physical Initial Visit NOTE           12/4/2022    Ariel العلي                        Date of Admission:  12/3/2022    The patient's chart is reviewed and the patient is discussed with the staff. Subjective: The patient is an 82 y.o. female seen and evaluated at the request of Dr. Bella Urias for respiratory management. She has a hx of SSS s/p PCM, PAF on eliquis, recent CAP, obesity, and DVT. She presented to the ER on 12/3 with worsening dyspnea and edema along with orthopnea. Her sat on arrival was 53%. Labs notable for a normal WBC, anemia with Hgb of 8.1, K 3.2, and BNP of 2517 with a CXR concering for interstitial infiltrates. She was admitted to ICU and diuresed. She was placed on CPAP overnight and has been weaned to 50% O2 with a sat of 94%. She has diuresed a net 1530 ml. The pt reports that she feels better this AM on CPAP 8 and 50%. Ve still high about 20L. Denies and fever/chills/sweats, or sick contacts. Has lost about 15 lb over the past year. She denies EDS, snoring, witnessed apneas. She takes on nap per day. She is a former smoker of 1 PPD but quit many years ago. Labs this AM with low K and Mg which are being repleted. Echo in 8/2022 with normal LVFx and diastolic function but some LVH. Review of Systems  Pertinent items are noted in HPI.     [unfilled]  Past Medical History:   Diagnosis Date    Chest pain 10/30/2015    Dyspnea     GERD (gastroesophageal reflux disease)     Headache     HTN (hypertension), benign 10/30/2015    Malaise and fatigue     Paroxysmal atrial fibrillation ([address] 10/30/2015    Preoperative cardiovascular examination     Psychiatric disorder     PUD (peptic ulcer disease)     Thromboembolus ([address]     leg    Thyroid disease      Past Surgical History:   Procedure Laterality Date    BREAST REDUCTION SURGERY      EP DEVICE PROCEDURE N/A 10/19/2022    INSERT PPM DUAL performed by Zuleyka Vargas MD at [address] [place] [address] CATH LAB EP DEVICE PROCEDURE N/A 10/19/2022    Loop recorder removal performed by Nimco Quinteros MD at [address] [place] [address]    EYE SURGERY      HYSTERECTOMY (CERVIX STATUS UNKNOWN)      LAP,CHOLECYSTECTOMY      W[place] APPENDECTOMY       Social History     Socioeconomic History    Marital status:      Spouse name: Not on file    Number of children: Not on file    Years of education: Not on file    Highest education level: Not on file   Occupational History    Not on file   Tobacco Use    Smoking status: Former     Packs/day: 1.00     Types: Cigarettes    Smokeless tobacco: Former   Substance and Sexual Activity    Alcohol use: No    Drug use: Not on file    Sexual activity: Not on file   Other Topics Concern    Not on file   Social History Narrative    Not on file     Social Determinants of Health     Financial Resource Strain: Not on file   Food Insecurity: Not on file   Transportation Needs: Not on file   Physical Activity: Not on file   Stress: Not on file   Social Connections: Not on file   Intimate Partner Violence: Not on file   Housing Stability: Not on file     Family History   Problem Relation Age of Onset    Diabetes Brother     Cancer Father         liver    Cancer Brother         prostate    Heart Attack Brother     Cancer Mother         non hog    Diabetes Mother      Allergies   Allergen Reactions    Aloe Vera Other (See Comments)    Metoclopramide Other (See Comments)    Morphine Other (See Comments)    Penicillins Other (See Comments)    Sulfa Antibiotics Other (See Comments)    Sulfamethoxazole-Trimethoprim Other (See Comments)    Adhesive Tape Rash     Current Facility-Administered Medications   Medication Dose Route Frequency    potassium chloride (KLOR-CON M) extended release tablet 40 mEq  40 mEq Oral PRN    Or    potassium bicarb-citric acid (EFFER-K) effervescent tablet 40 mEq  40 mEq Oral PRN    Or    potassium chloride 10 mEq/100 mL IVPB (Peripheral Line)  10 mEq IntraVENous PRN    magnesium sulfate 2000 mg in 50 mL IVPB premix  2,000 mg IntraVENous PRN    sodium chloride flush 0.9 % injection 5 mL  5 mL IntraVENous Q8H    sodium chloride flush 0.9 % injection 10 mL  10 mL IntraVENous PRN    amitriptyline (ELAVIL) tablet 25 mg  25 mg Oral Nightly    diclofenac sodium (VOLTAREN) 1 % gel 1 g  1 g Topical 4x Daily PRN    apixaban (ELIQUIS) tablet 5 mg  5 mg Oral BID    ferrous sulfate (IRON 325) tablet 325 mg  325 mg Oral Daily    flecainide (TAMBOCOR) tablet 75 mg  75 mg Oral BID    FLUoxetine (PROZAC) capsule 60 mg  60 mg Oral Daily    hydrOXYzine HCl (ATARAX) tablet 25 mg  25 mg Oral Nightly PRN    levothyroxine (SYNTHROID) tablet 50 mcg  50 mcg Oral QAM AC    metoprolol succinate (TOPROL XL) extended release tablet 50 mg  50 mg Oral Daily    pantoprazole (PROTONIX) tablet 40 mg  40 mg Oral QAM AC    potassium chloride (KLOR-CON M) extended release tablet 10 mEq  10 mEq Oral Nightly    timolol (TIMOPTIC) 0.5 % ophthalmic solution 1 drop  1 drop Both Eyes Daily    mometasone-formoterol (DULERA) 200-5 MCG/ACT inhaler 2 puff  2 puff Inhalation BID    LORazepam (ATIVAN) tablet 1 mg  1 mg Oral Q12H PRN    HYDROcodone-acetaminophen (NORCO) 7.5-325 MG per tablet 1 tablet  1 tablet Oral Q6H PRN    sennosides-docusate sodium (SENOKOT-S) 8.6-50 MG tablet 2 tablet  2 tablet Oral Nightly    furosemide (LASIX) injection 40 mg  40 mg IntraVENous BID    albuterol (PROVENTIL) nebulizer solution 2.5 mg  2.5 mg Nebulization Q8H PRN    nitroGLYCERIN 50 mg in dextrose 5% 250 mL infusion  5-200 mcg/min IntraVENous Continuous    sodium chloride flush 0.9 % injection 5-40 mL  5-40 mL IntraVENous 2 times per day    sodium chloride flush 0.9 % injection 5-40 mL  5-40 mL IntraVENous PRN    0.9 % sodium chloride infusion   IntraVENous PRN    polyethylene glycol (GLYCOLAX) packet 17 g  17 g Oral Daily PRN    acetaminophen (TYLENOL) tablet 650 mg  650 mg Oral Q6H PRN    Or    acetaminophen (TYLENOL) suppository 650 mg  650 mg Rectal Q6H PRN    tuberculin injection 5 Units  5 Units IntraDERmal Once     Objective:   Blood pressure 119/60, pulse 64, temperature 98.2 °F (36.8 °C), temperature source Axillary, resp. rate 30, height 5' 6\" (1.676 m), weight 195 lb 12.3 oz (88.8 kg), SpO2 94 %. Intake/Output Summary (Last 24 hours) at 12/4/2022 0708  Last data filed at 12/4/2022 0529  Gross per 24 hour   Intake 20.68 ml   Output 1550 ml   Net -1529.32 ml     PHYSICAL EXAM   Constitutional:  the patient is obese and in no acute distress on CPAP  EENMT:  Sclera clear, pupils equal, oral mucosa moist  Respiratory: few trace rhonchi  Cardiovascular:  RRR without M,G,R  Gastrointestinal: soft and non-tender; with positive bowel sounds. Musculoskeletal: warm without cyanosis. There is trace lower extremity edema. Skin:  no jaundice or rashes  Neurologic: no gross neuro deficits     Psychiatric:  alert and oriented x 3    CXR:      12/3, 11/17:          Recent Labs     12/03/22  1506 12/03/22  1716 12/04/22  0346   WBC 9.7  --  9.0   HGB 8.1*  --  7.8*   HCT 26.6*  --  26.2*     --  256     --  139   K 3.2*  --  3.0*     --  99*   CO2 31  --  35*   BUN 20  --  17   MG 2.0  --  1.6*   BILITOT 1.0  --  1.2*   AST 40*  --  41*   ALT 29  --  26   TROPHS 14.6* 16.3*  --      Echo 8/8/22:      Left Ventricle: Normal left ventricular systolic function with a visually estimated EF of 55 - 60%. Left ventricle size is normal. Mildly increased wall thickness. Normal wall motion. Normal diastolic function. Tricuspid Valve: The estimated RVSP is 15 mmHg. Assessment and Plan:  (Medical Decision Making)       Principal Problem:    Acute respiratory failure ([address]): on CPAP 50% with sat 95%. Ve high about 20 L/m. Plan: Check current CXR and BNP. Wean to airvo if able. Active Problems:    Pacemaker  Plan: Per cardiology    Anemia, unspecified  Plan: Monitor; tx prn.     Hypokalemia  Plan: Replete    Pulmonary infiltrates  Plan: Recheck CXR after diuresis. Consider Viral panel if infiltrates no better with diuresis    Anticoagulant long-term use  Plan: Continue eliquis. Paroxysmal atrial fibrillation (HCC)  Plan: Follow    HTN (hypertension), benign  Plan: Control      More than 50% of the time documented was spent in face-to-face contact with the patient and in the care of the patient on the floor/unit where the patient is located. Thank you very much for this referral.  We appreciate the opportunity to participate in this patient's care. Will follow along with above stated plan.     Ernesto Arora MD

## 2022-12-04 NOTE — PLAN OF CARE
Problem: Discharge Planning  Goal: Discharge to home or other facility with appropriate resources  Outcome: Progressing  Flowsheets (Taken 12/4/2022 2247 by Kyra Hernandez RN)  Discharge to home or other facility with appropriate resources: Identify barriers to discharge with patient and caregiver     Problem: Skin/Tissue Integrity  Goal: Absence of new skin breakdown  Description: 1. Monitor for areas of redness and/or skin breakdown  2. Assess vascular access sites hourly  3. Every 4-6 hours minimum:  Change oxygen saturation probe site  4. Every 4-6 hours:  If on nasal continuous positive airway pressure, respiratory therapy assess nares and determine need for appliance change or resting period.   Outcome: Progressing     Problem: Safety - Adult  Goal: Free from fall injury  Outcome: Progressing     Problem: ABCDS Injury Assessment  Goal: Absence of physical injury  Outcome: Progressing     Problem: Pain  Goal: Verbalizes/displays adequate comfort level or baseline comfort level  Outcome: Progressing

## 2022-12-04 NOTE — ED NOTES
TRANSFER - OUT REPORT:    Verbal report given to Benedicto Garcia RN on Judith Lopez  being transferred to ICU for routine progression of patient care       Report consisted of patient's Situation, Background, Assessment and   Recommendations(SBAR). Information from the following report(s) ED SBAR was reviewed with the receiving nurse. [place] Assessment: No data recorded  Lines:   Peripheral IV 12/03/22 Proximal;Right; Anterior Forearm (Active)        Opportunity for questions and clarification was provided.       Patient transported with:  Monitor and Registered Nurse, respiratory, and bipap           Gil Key RN  12/03/22 7910

## 2022-12-04 NOTE — PROGRESS NOTES
ACUTE PHYSICAL THERAPY GOALS:   (Developed with and agreed upon by patient and/or caregiver. )  LTG:  (1.)Ms. Gloria Mendes will move from supine to sit and sit to supine , scoot up and down, and roll side to side in bed with INDEPENDENT within 7 treatment day(s). (2.)Ms. Gloria Mendes will transfer from bed to chair and chair to bed with STAND BY ASSIST using the least restrictive device within 7 treatment day(s). (3.)Ms. Gloria Mendes will ambulate with STAND BY ASSIST for 150+ feet with the least restrictive device within 7 treatment day(s) while maintaining normal vital signs. (4.)Ms. Gloria Mendes will perform 2 stairs with HR and CGA within 7 treatment days for ascending and descending stairs for home. (5.)Ms. Gloria Mendes will tolerate 23+ minutes of therapeutic activity within 7 treatment days while maintaining SPO2 >90% throughout for increased activity tolerance and strength.   ________________________________________________________________________________________________       PHYSICAL THERAPY Initial Assessment and AM  (Link to Caseload Tracking: PT Visit Days : 1  Acknowledge Orders  Time In/Out  PT Charge Capture  Rehab Caseload Tracker    Divina Ortega is a 82 y.o. female   PRIMARY DIAGNOSIS: Acute respiratory failure ([facility]  Acute respiratory failure (HCC) [J96.00]  Acute congestive heart failure, unspecified heart failure type ([address] [I50.9]       Reason for Referral: Difficulty in walking, Not elsewhere classified (R26.2)  History of falling (Z91.81)  Inpatient: Payor: [address] [place] [address],[address] / Plan: MEDICARE PART A AND B / Product Type: *No Product type* /     ASSESSMENT:     REHAB RECOMMENDATIONS:   Recommendation to date pending progress:  Setting:  Home Health Therapy    Equipment:    To Be Determined     ASSESSMENT:  Ms. Gloria Mendes presents with decreased bed mobility, transfers, ambulation, balance, activity tolerance, and overall general functional mobility s/p hospital admission with SOB, cough, ARF, pneumonia x 1 week at home. Pt hx significant for recent pacemaker placed 10/19/22, CHF. Pt reports several falls past few months from passing out, has not since pacemaker placed. Pt now in ICU, on CPAP at 50%, SPO2 at rest 93%. Pt lives with spouse, MOD I with SPC, indp ADLs, past several weeks with O2 continuous at home 3-4 L. Pt today MIN A for bed mobility, supine to sit. Good static sitting EOB, CPAP increased to 65% for O2 needs while working. Pt MIN A transfers, required HHA, unsteady on feet, several sit to stands at bedside, attempted side steps with HHA, fatigued and frequent rest breaks required. Pt returned to supine after session, left on 65% CPAP for recovery, O2 stats 94%. PT to follow for acute care needs to address deficits noted above, pt functioning below baseline of MOD I.      [address] [facility] [address] [address] AM-PAC 6 Clicks Basic Mobility Inpatient Short Form  AM-PAC Mobility Inpatient   How much difficulty turning over in bed?: A Little  How much difficulty sitting down on / standing up from a chair with arms?: A Little  How much difficulty moving from lying on back to sitting on side of bed?: A Little  How much help from another person moving to and from a bed to a chair?: A Little  How much help from another person needed to walk in hospital room?: A Little  How much help from another person for climbing 3-5 steps with a railing?: A Lot  AM-PAC Inpatient Mobility Raw Score : 17  AM-PAC Inpatient T-Scale Score : 42.13  Mobility Inpatient CMS 0-100% Score: 50.57  Mobility Inpatient CMS G-Code Modifier : CK    SUBJECTIVE:   Ms. Gloria Mendes states, \" I will try\"     Social/Functional Lives With: Spouse  Type of Home: House  Home Layout: One level  Home Access: Stairs to enter with rails  Entrance Stairs - Number of Steps: 2  Home Equipment: Cane  ADL Assistance: Independent  Active : Yes  Mod I at baseline with SPC, past falls, indp ADLs  OBJECTIVE:     PAIN: VITALS / O2: Carson Real / Nandini Leer / DRAINS:   Pre Treatment:   Pain Assessment: None - Denies Pain      Post Treatment: 0/10 Vitals        Oxygen  O2 Therapy: Oxygen  O2 Device:  (CPAP)  FiO2 : 50 %  SpO2: 93 %   Continuous Pulse Oximetry, IV, Purewick, and Telemetry     RESTRICTIONS/PRECAUTIONS:  Restrictions/Precautions: Fall Risk                 GROSS EVALUATION: Intact Impaired (Comments):   AROM [x]     PROM [x]    Strength []  Grossly 4/5   Balance [] Sitting - Static: Good  Sitting - Dynamic: Fair  Standing - Static: Fair  Standing - Dynamic: Fair, -   Posture [] Rounded Shoulders   Sensation [x]     Coordination [x]      Tone [x]     Edema [] B LE   Activity Tolerance []  General fatigue-on CPAP    []      COGNITION/  PERCEPTION: Intact Impaired (Comments):   Orientation [x]     Vision [x]     Hearing [x]     Cognition  [x]       MOBILITY: I Mod I S SBA CGA Min Mod Max Total  NT x2 Comments:   Bed Mobility    Rolling [] [] [] [] [] [x] [] [] [] [] []    Supine to Sit [] [] [] [] [] [x] [] [] [] [] []    Scooting [] [] [] [] [] [x] [] [] [] [] []    Sit to Supine [] [] [] [] [] [x] [] [] [] [] []    Transfers    Sit to Stand [] [] [] [] [] [x] [] [] [] [] []    Bed to Chair [] [] [] [] [] [] [] [] [] [x] []    Stand to Sit [] [] [] [] [] [x] [] [] [] [] []     [] [] [] [] [] [] [] [] [] [] []    I=Independent, Mod I=Modified Independent, S=Supervision, SBA=Standby Assistance, CGA=Contact Guard Assistance,   Min=Minimal Assistance, Mod=Moderate Assistance, Max=Maximal Assistance, Total=Total Assistance, NT=Not Tested    GAIT: I Mod I S SBA CGA Min Mod Max Total  NT x2 Comments:   Level of Assistance [] [] [] [] [] [x] [] [] [] [] []    Distance   Side steps to [facility]    DME HHA and close contact    Gait Quality Decreased danny  and Shuffling     Weightbearing Status Restrictions/Precautions  Restrictions/Precautions: Fall Risk    Stairs      I=Independent, Mod I=Modified Independent, S=Supervision, SBA=Standby Assistance, CGA=Contact Guard Assistance,   Min=Minimal Assistance, Mod=Moderate Assistance, Max=Maximal Assistance, Total=Total Assistance, NT=Not Tested    PLAN:   FREQUENCY AND DURATION: 3 times/week for duration of hospital stay or until stated goals are met, whichever comes first.    THERAPY PROGNOSIS: Good    PROBLEM LIST:   (Skilled intervention is medically necessary to address:)  Decreased ADL/Functional Activities  Decreased Activity Tolerance  Decreased Balance  Decreased Gait Ability  Decreased Strength  Decreased Transfer Abilities INTERVENTIONS PLANNED:   (Benefits and precautions of physical therapy have been discussed with the patient.)  Therapeutic Activity  Therapeutic Exercise/HEP  Neuromuscular Re-education  Gait Training  Education       TREATMENT:   EVALUATION: LOW COMPLEXITY: (Untimed Charge)    TREATMENT:   Therapeutic Activity (23 Minutes): Therapeutic activity included Supine to Sit, Sit to Supine, Scooting, Transfer Training, Ambulation on level ground, Sitting balance , and Standing balance to improve functional Activity tolerance, Balance, Coordination, Mobility, and Strength.     TREATMENT GRID:  N/A    AFTER TREATMENT PRECAUTIONS: Bed, Bed/Chair Locked, Call light within reach, Needs within reach, RN notified, and Side rails x3    INTERDISCIPLINARY COLLABORATION:  RN/ PCT and PT/ PTA    EDUCATION: Education Given To: Patient  Education Provided: Transfer Training;Energy Conservation  Education Method: Verbal  Barriers to Learning: None  Education Outcome: Verbalized understanding    TIME IN/OUT:  Time In: 1126  Time Out: [address]  Minutes: Dylan Lo, PT

## 2022-12-04 NOTE — PROGRESS NOTES
TRANSFER - IN REPORT:    Verbal report received from Heather [place], [place] on Julia   being received from ED for routine progression of patient care      Report consisted of patient's Situation, Background, Assessment and   Recommendations(SBAR). Information from the following report(s) Nurse Handoff Report, Intake/Output, and Cardiac Rhythm NSR  was reviewed with the receiving nurse. Opportunity for questions and clarification was provided. Assessment completed upon patient's arrival to unit and care assumed.

## 2022-12-05 ENCOUNTER — APPOINTMENT (OUTPATIENT)
Dept: GENERAL RADIOLOGY | Age: 87
End: 2022-12-05
Payer: MEDICARE

## 2022-12-05 ENCOUNTER — APPOINTMENT (OUTPATIENT)
Dept: CT IMAGING | Age: 87
End: 2022-12-05
Payer: MEDICARE

## 2022-12-05 PROBLEM — I50.9 ACUTE CONGESTIVE HEART FAILURE (HCC): Status: ACTIVE | Noted: 2022-12-05

## 2022-12-05 LAB
ANION GAP SERPL CALC-SCNC: 5 MMOL/L (ref 2–11)
BACTERIA SPEC CULT: NORMAL
BUN SERPL-MCNC: 22 MG/DL (ref 8–23)
CALCIUM SERPL-MCNC: 9.1 MG/DL (ref 8.3–10.4)
CHLORIDE SERPL-SCNC: 102 MMOL/L (ref 101–110)
CO2 SERPL-SCNC: 29 MMOL/L (ref 21–32)
CREAT SERPL-MCNC: 0.8 MG/DL (ref 0.6–1)
ERYTHROCYTE [DISTWIDTH] IN BLOOD BY AUTOMATED COUNT: 18.2 % (ref 11.9–14.6)
GLUCOSE SERPL-MCNC: 97 MG/DL (ref 65–100)
HCT VFR BLD AUTO: 24.6 % (ref 35.8–46.3)
HGB BLD-MCNC: 7.3 G/DL (ref 11.7–15.4)
MAGNESIUM SERPL-MCNC: 1.8 MG/DL (ref 1.8–2.4)
MCH RBC QN AUTO: 27.8 PG (ref 26.1–32.9)
MCHC RBC AUTO-ENTMCNC: 29.7 G/DL (ref 31.4–35)
MCV RBC AUTO: 93.5 FL (ref 82–102)
NRBC # BLD: 0 K/UL (ref 0–0.2)
PLATELET # BLD AUTO: 249 K/UL (ref 150–450)
PMV BLD AUTO: 9.3 FL (ref 9.4–12.3)
POTASSIUM SERPL-SCNC: 3.5 MMOL/L (ref 3.5–5.1)
RBC # BLD AUTO: 2.63 M/UL (ref 4.05–5.2)
SERVICE CMNT-IMP: NORMAL
SODIUM SERPL-SCNC: 136 MMOL/L (ref 133–143)
STREP, MOLECULAR: NOT DETECTED
WBC # BLD AUTO: 10.1 K/UL (ref 4.3–11.1)

## 2022-12-05 PROCEDURE — 87040 BLOOD CULTURE FOR BACTERIA: CPT

## 2022-12-05 PROCEDURE — 2580000003 HC RX 258: Performed by: INTERNAL MEDICINE

## 2022-12-05 PROCEDURE — 99291 CRITICAL CARE FIRST HOUR: CPT | Performed by: INTERNAL MEDICINE

## 2022-12-05 PROCEDURE — 97112 NEUROMUSCULAR REEDUCATION: CPT

## 2022-12-05 PROCEDURE — 36415 COLL VENOUS BLD VENIPUNCTURE: CPT

## 2022-12-05 PROCEDURE — 99232 SBSQ HOSP IP/OBS MODERATE 35: CPT | Performed by: INTERNAL MEDICINE

## 2022-12-05 PROCEDURE — 92610 EVALUATE SWALLOWING FUNCTION: CPT

## 2022-12-05 PROCEDURE — 6360000002 HC RX W HCPCS: Performed by: INTERNAL MEDICINE

## 2022-12-05 PROCEDURE — 97530 THERAPEUTIC ACTIVITIES: CPT

## 2022-12-05 PROCEDURE — 2700000000 HC OXYGEN THERAPY PER DAY

## 2022-12-05 PROCEDURE — 94760 N-INVAS EAR/PLS OXIMETRY 1: CPT

## 2022-12-05 PROCEDURE — 83735 ASSAY OF MAGNESIUM: CPT

## 2022-12-05 PROCEDURE — 87651 STREP A DNA AMP PROBE: CPT

## 2022-12-05 PROCEDURE — 87641 MR-STAPH DNA AMP PROBE: CPT

## 2022-12-05 PROCEDURE — 2580000003 HC RX 258: Performed by: EMERGENCY MEDICINE

## 2022-12-05 PROCEDURE — 80048 BASIC METABOLIC PNL TOTAL CA: CPT

## 2022-12-05 PROCEDURE — 2000000000 HC ICU R&B

## 2022-12-05 PROCEDURE — 97535 SELF CARE MNGMENT TRAINING: CPT

## 2022-12-05 PROCEDURE — 71250 CT THORAX DX C-: CPT

## 2022-12-05 PROCEDURE — 71045 X-RAY EXAM CHEST 1 VIEW: CPT

## 2022-12-05 PROCEDURE — 85027 COMPLETE CBC AUTOMATED: CPT

## 2022-12-05 PROCEDURE — 94640 AIRWAY INHALATION TREATMENT: CPT

## 2022-12-05 PROCEDURE — 6370000000 HC RX 637 (ALT 250 FOR IP): Performed by: FAMILY MEDICINE

## 2022-12-05 PROCEDURE — 97165 OT EVAL LOW COMPLEX 30 MIN: CPT

## 2022-12-05 PROCEDURE — 94660 CPAP INITIATION&MGMT: CPT

## 2022-12-05 PROCEDURE — 2500000003 HC RX 250 WO HCPCS: Performed by: INTERNAL MEDICINE

## 2022-12-05 PROCEDURE — 2580000003 HC RX 258: Performed by: FAMILY MEDICINE

## 2022-12-05 RX ORDER — METHYLPREDNISOLONE SODIUM SUCCINATE 125 MG/2ML
60 INJECTION, POWDER, LYOPHILIZED, FOR SOLUTION INTRAMUSCULAR; INTRAVENOUS EVERY 6 HOURS
Status: DISCONTINUED | OUTPATIENT
Start: 2022-12-05 | End: 2022-12-07

## 2022-12-05 RX ADMIN — POLYETHYLENE GLYCOL 3350 17 G: 17 POWDER, FOR SOLUTION ORAL at 19:55

## 2022-12-05 RX ADMIN — AMITRIPTYLINE HYDROCHLORIDE 25 MG: 25 TABLET, FILM COATED ORAL at 19:38

## 2022-12-05 RX ADMIN — SODIUM CHLORIDE, PRESERVATIVE FREE 10 ML: 5 INJECTION INTRAVENOUS at 09:20

## 2022-12-05 RX ADMIN — CEFEPIME 2000 MG: 2 INJECTION, POWDER, FOR SOLUTION INTRAVENOUS at 10:45

## 2022-12-05 RX ADMIN — METHYLPREDNISOLONE SODIUM SUCCINATE 60 MG: 125 INJECTION, POWDER, FOR SOLUTION INTRAMUSCULAR; INTRAVENOUS at 16:30

## 2022-12-05 RX ADMIN — MOMETASONE FUROATE AND FORMOTEROL FUMARATE DIHYDRATE 2 PUFF: 200; 5 AEROSOL RESPIRATORY (INHALATION) at 19:20

## 2022-12-05 RX ADMIN — FLECAINIDE ACETATE 75 MG: 50 TABLET ORAL at 09:19

## 2022-12-05 RX ADMIN — DOXYCYCLINE 100 MG: 100 INJECTION, POWDER, LYOPHILIZED, FOR SOLUTION INTRAVENOUS at 09:55

## 2022-12-05 RX ADMIN — HYDROCODONE BITARTRATE AND ACETAMINOPHEN 1 TABLET: 7.5; 325 TABLET ORAL at 09:19

## 2022-12-05 RX ADMIN — SENNOSIDES AND DOCUSATE SODIUM 2 TABLET: 8.6; 5 TABLET ORAL at 19:38

## 2022-12-05 RX ADMIN — FLUOXETINE 60 MG: 20 CAPSULE ORAL at 09:19

## 2022-12-05 RX ADMIN — TIMOLOL MALEATE 1 DROP: 5 SOLUTION OPHTHALMIC at 09:21

## 2022-12-05 RX ADMIN — ACETAMINOPHEN 650 MG: 325 TABLET ORAL at 02:08

## 2022-12-05 RX ADMIN — MOMETASONE FUROATE AND FORMOTEROL FUMARATE DIHYDRATE 2 PUFF: 200; 5 AEROSOL RESPIRATORY (INHALATION) at 07:23

## 2022-12-05 RX ADMIN — METHYLPREDNISOLONE SODIUM SUCCINATE 60 MG: 125 INJECTION, POWDER, FOR SOLUTION INTRAMUSCULAR; INTRAVENOUS at 22:30

## 2022-12-05 RX ADMIN — SODIUM CHLORIDE, PRESERVATIVE FREE 10 ML: 5 INJECTION INTRAVENOUS at 19:39

## 2022-12-05 RX ADMIN — METHYLPREDNISOLONE SODIUM SUCCINATE 60 MG: 125 INJECTION, POWDER, FOR SOLUTION INTRAMUSCULAR; INTRAVENOUS at 10:54

## 2022-12-05 RX ADMIN — FERROUS SULFATE TAB 325 MG (65 MG ELEMENTAL FE) 325 MG: 325 (65 FE) TAB at 09:19

## 2022-12-05 RX ADMIN — SODIUM CHLORIDE, PRESERVATIVE FREE 5 ML: 5 INJECTION INTRAVENOUS at 15:20

## 2022-12-05 RX ADMIN — SODIUM CHLORIDE, PRESERVATIVE FREE 5 ML: 5 INJECTION INTRAVENOUS at 07:15

## 2022-12-05 RX ADMIN — HYDROCODONE BITARTRATE AND ACETAMINOPHEN 1 TABLET: 7.5; 325 TABLET ORAL at 19:41

## 2022-12-05 RX ADMIN — DOXYCYCLINE 100 MG: 100 INJECTION, POWDER, LYOPHILIZED, FOR SOLUTION INTRAVENOUS at 22:30

## 2022-12-05 RX ADMIN — FLECAINIDE ACETATE 75 MG: 50 TABLET ORAL at 19:38

## 2022-12-05 RX ADMIN — APIXABAN 5 MG: 5 TABLET, FILM COATED ORAL at 20:30

## 2022-12-05 RX ADMIN — LEVOTHYROXINE SODIUM 50 MCG: 0.05 TABLET ORAL at 04:37

## 2022-12-05 RX ADMIN — CEFEPIME 2000 MG: 2 INJECTION, POWDER, FOR SOLUTION INTRAVENOUS at 22:30

## 2022-12-05 RX ADMIN — APIXABAN 5 MG: 5 TABLET, FILM COATED ORAL at 09:19

## 2022-12-05 RX ADMIN — PANTOPRAZOLE SODIUM 40 MG: 40 TABLET, DELAYED RELEASE ORAL at 04:37

## 2022-12-05 RX ADMIN — METOPROLOL SUCCINATE 50 MG: 50 TABLET, EXTENDED RELEASE ORAL at 09:19

## 2022-12-05 ASSESSMENT — PAIN DESCRIPTION - DESCRIPTORS
DESCRIPTORS: ACHING;DISCOMFORT
DESCRIPTORS: ACHING

## 2022-12-05 ASSESSMENT — PAIN DESCRIPTION - PAIN TYPE
TYPE: CHRONIC PAIN
TYPE: CHRONIC PAIN

## 2022-12-05 ASSESSMENT — PAIN DESCRIPTION - LOCATION
LOCATION: BACK
LOCATION: BACK

## 2022-12-05 ASSESSMENT — PAIN SCALES - GENERAL
PAINLEVEL_OUTOF10: 2
PAINLEVEL_OUTOF10: 0
PAINLEVEL_OUTOF10: 5
PAINLEVEL_OUTOF10: 5

## 2022-12-05 ASSESSMENT — PAIN DESCRIPTION - ORIENTATION: ORIENTATION: POSTERIOR

## 2022-12-05 ASSESSMENT — PAIN - FUNCTIONAL ASSESSMENT: PAIN_FUNCTIONAL_ASSESSMENT: ACTIVITIES ARE NOT PREVENTED

## 2022-12-05 NOTE — PROGRESS NOTES
LTG: Patient will tolerate least restrictive oral diet without overt s/sx of airway compromise. STG: Patient will consume regular diet and thin liquids without overt s/sx of airway compromise. STG: Patient will participate in ongoing po trials with SLP in attempt to identify least restrictive oral diet. STG: Patient will participate in modified barium swallow study to objectively assess swallow function as medically indicated. SPEECH LANGUAGE PATHOLOGY: DYSPHAGIA  Initial Assessment    NAME: Elizabeth Finnegan  : 1935  MRN: [id number]    ADMISSION DATE: 12/3/2022  PRIMARY DIAGNOSIS: Acute respiratory failure (HCC)  Acute respiratory failure (HCC) [J96.00]  Acute congestive heart failure, unspecified heart failure type ([facility] [I50.9]    ICD-10: Treatment Diagnosis: R13.13 Dysphagia, Pharyngeal Phase    RECOMMENDATIONS   Diet:  Diet Solids Recommendation: Regular  Liquid Consistency Recommendation: Thin    Medications: PO     Recommendations: Dysphagia treatment     Compensatory Swallowing Strategies: Alternate solids and liquids;Upright as possible for all oral intake; Set up assist;Small bites/sips     Therapeutic Intervention:Patient/Family education; Therapeutic PO trials with SLP     Patient continues to require skilled intervention: Yes  D/C Recommendations: Ongoing speech therapy is recommended during this hospitalization       ASSESSMENT    Dysphagia Diagnosis: Suspected needs further assessment  Dysphagia Impression : Patient presents with some question of pharyngeal dysphagia. She appears to tolerate thin liquids, puree, mixed, and coarse solids without difficulty; however, cough did occur occasionally during session. Patient feels cough is unrelated to po intake and occurred spontaneously. Recommend continue regular diet and thin liquids. Medications as tolerated. Educated on need for small bites/sip, slow rate of intake, rest breaks as needed.   Will plan to follow up for diet tolerance and to ensure safety with intake. Should ongoing concerns remain, may need to consider diet modifications and/or instrumental assessment once off airvo. GENERAL    Subjective: Patient seen at conclusion of noontime meal. She endorses single instance of coughing during po intake yesterday. She denies typical dysphagia. Behavior/Cognition: Alert; Cooperative;Pleasant mood  Communication Observation: Functional  Follows Directions: Simple        O2 Device:  (Airvo 45L 95%)        History of Present Injury/Illness: Ms. Lamin Douglas  has a past medical history of Chest pain, Dyspnea, GERD (gastroesophageal reflux disease), Headache, HTN (hypertension), benign, Malaise and fatigue, Paroxysmal atrial fibrillation ([address] Preoperative cardiovascular examination, Psychiatric disorder, PUD (peptic ulcer disease), Thromboembolus ([address] and Thyroid disease. . She also  has a past surgical history that includes lap,cholecystectomy; Breast reduction surgery; Hysterectomy; pr appendectomy; eye surgery; ep device procedure (N/A, 10/19/2022); and ep device procedure (N/A, 10/19/2022). Prior Dysphagia History: None     Current Diet : Regular  Current Liquid Diet : Thin    Pain:   Patient does not c/o pain                                          OBJECTIVE    Oral Motor Function:  Patient Positioning: Upright in chair   Oral Motor   Labial: No impairment  Dentition: Intact  Oral Hygiene: Moist;Clean  Lingual: No impairment  Mandible: No impairment  Dentition: Adequate     Volitional Cough: Strong  Baseline Vocal Quality: Normal    Oropharyngeal Phase:     Assessment Method(s): Observation;Palpation  Vocal Quality: No Impairment  Consistency Presented: Mixed consistency; Regular;Pureed; Thin  How Presented: Self-fed/presented;Spoon;Straw;Successive Swallows  Bolus Acceptance: No impairment  Bolus Formation/Control: No impairment  Propulsion: No impairment  Oral Residue: None  Initiation of Swallow: No impairment  Laryngeal Elevation: Functional  Aspiration Signs/Symptoms:  (Cough noted on 3 instances during session; once on puree, and once on solids. Third instance occured between trials. Patient indicating cough was unrelated to po intake.)  Pharyngeal Phase Characteristics: No impairment, issues, or problems  Comments: Functional oropharyngeal swallow. Patient with 3 instances of coughing throughout session. These occured both with and without po intake. She does not feel that cough was related to airway compromise; rather a cough that occured spontaneously. No difficulty noted with over 5 ounces of thin liquids. PLAN    Duration/Frequency: Continue to follow patient 2x/week for duration of hospitalization and/or until goals met    Dysphagia Outcome and Severity Scale (CHELA)  Dysphagia Outcome Severity Scale: Level 6: Within functional limits/Modified independence  Interpretation of Tool: The Dysphagia Outcome and Severity Scale (CHELA) is a simple, easy-to-use, 7-point scale developed to systematically rate the functional severity of dysphagia based on objective assessment and make recommendations for diet level, independence level, and type of nutrition.   Normal(7), Functional(6), Mild(5), Mild-Moderate(4), Moderate(3), Moderate-Severe(2), Severe(1)    Speech Therapy Prognosis  Prognosis: Good  Prognosis Considerations: Previous Level of Function    Education: Patient, RN  Patient Education: Role of SLP, aspiration concerns, recommendations  Patient Education Response: Verbalizes understanding    PRECAUTIONS/ALLERGIES: Aloe vera, Metoclopramide, Morphine, Penicillins, Sulfa antibiotics, Sulfamethoxazole-trimethoprim, and Adhesive tape   Safety Devices in place: Yes  Type of devices: Left in chair, Call light within reach, Nurse notified    Therapy Time  SLP Individual Minutes  Time In: 6161  Time Out: 7996  Minutes: [address]  12/5/2022 12:53 PM

## 2022-12-05 NOTE — PROGRESS NOTES
[facility] CARDIOLOGY PROGRESS NOTE           12/5/2022 8:34 AM    Admit Date: 12/3/2022      Subjective:   Patient remains on high flow O2. She continues to cough. ROS:  Cardiovascular:  As noted above    Objective:      Vitals:    12/05/22 0501 12/05/22 0516 12/05/22 0531 12/05/22 0700   BP: 128/63   130/60   Pulse: 67 67 68 64   Resp: 26 30  26   Temp:       TempSrc:       SpO2: 93% 93% 94% 91%   Weight:       Height:           Physical Exam:  General-No Acute Distress  Neck- supple, no JVD  CV- regular rate and rhythm no MRG  Lung-managed bilaterally worse right than left  Abd- soft, nontender, nondistended  Ext- no edema bilaterally. Skin- warm and dry    Data Review:   Recent Labs     12/04/22  0346 12/04/22  1343 12/05/22  0312     --  136   K 3.0* 3.5 3.5   MG 1.6* 2.2 1.8   BUN 17  --  22   WBC 9.0  --  10.1   HGB 7.8*  --  7.3*   HCT 26.2*  --  24.6*     --  249       Assessment/Plan:     Principal Problem:    Acute respiratory failure (HCC)  Plan: Chest x-ray today demonstrates right middle lobe infiltrates worrisome for possible pneumonia or aspiration. Do not feel patient's acute respiratory failure is secondary to volume overload. Active Problems:    Pacemaker  Plan: Normal function    Anemia, unspecified  Plan: Acutely worse today may benefit from transfusion. Hypokalemia  Plan: Replete per protocol. Pulmonary infiltrates  Plan: Suspect this is pneumonia/infection/chemical pneumonitis from aspiration. Acute congestive heart failure (HCC)  Plan: Do not feel patient has acute volume overload or acute congestive heart failure. We will remove this diagnosis. Anticoagulant long-term use  Plan: Chronically anticoagulated with Eliquis    Paroxysmal atrial fibrillation ([address]  Plan: Patient appears to have some component of some mode switching. Continue anticoagulation.     HTN (hypertension), benign  Plan: Hemodynamics are stable    Long term (current) use of anticoagulants  Plan: See above    Bradycardia  Plan: Normal pacemaker function.         Yash Martinez MD  12/5/2022 8:34 AM

## 2022-12-05 NOTE — PROGRESS NOTES
Hospitalist Progress Note   Admit Date:  12/3/2022  2:56 PM   Name:  Ita Lin   Age:  82 y.o. Sex:  female  :  1935   MRN:  [id number]   Room:  [facility] [place]/    Reason(s) for Admission: Acute respiratory failure ([facility] [place] [facility] [J96.00]  Acute congestive heart failure, unspecified heart failure type [facility]) [I50.9]     Hospital Course & Interval History:   Mrs. Delmis Marshall is an 83 y/o female with a h/o atrial fibrillation, hypothyroidism, bradycardia with PPM, fibromyalgia and GERD who was admitted to our service on 12/3 with acute hypoxemic respiratory failure felt due to atypical pneumonia vs pulmonary edema. She had a normal TTE In 2022. She was started on CPAP, IV Lasix and a nitroglycerin drip and admitted to ICU. She was seen by Cardiology. Pulmonology consulted. Subjective/24hr Events (22): Tm 100.7F overnight, off CPAP (wore until about 5am), now on Airvo 50L/90% though was at 70% earlier. Breathing is about the same. Marginal urine output with Lasix. Off nitro. No chest pain, N/V/D. Assessment & Plan:   # Acute hypoxemic respiratory failure              - RA sats reportedly in the 50s on arrival. Tolerating CPAP currently with plans to try and wean to Airvo. CXR atypical infiltrates vs pulmonary edema. Was started on IV Lasix and Cardiology consulted. Echo was normal in August and repeat on  is also unremarkable. Marginal urine output with Lasix and repeat echo normal, stop Lasix . RVP negative. Doxycycline added . Check CT chest wo contrast today, may need to add steroids, d/w Pulm. # HypoK // hypoMg              - Resolved     # Chronic normocytic anemia // iron def anemia              - Hb 9.8 in October, 7.3 on  AM. No clinical bleeding.      # GERD              - PPI     # Atrial fibrillation // bradycardia s/p PPM              - Eliquis, flecainide, metoprolol     # MDD // anxiety              - Prozac     # Hypothyroidism              - I can see him today at 2:00 (double book).    Osmel Philippe MD 12:46 PM 8/14/2020     Synthroid    Discharge Planning: Pending. Diet:  ADULT DIET; Regular; Low Sodium (2 gm)  DVT PPx: Eliquis  Code status: Full Code    Hospital Problems             Last Modified POA    * (Principal) Acute respiratory failure ([facility] 12/3/2022 Yes    Pacemaker 12/3/2022 Yes    Anemia, unspecified 12/3/2022 Yes    Hypokalemia 12/3/2022 Yes    Pulmonary infiltrates 12/4/2022 Yes    Acute congestive heart failure ([facility] 12/5/2022 Yes    Anticoagulant long-term use 12/3/2022 Yes    Paroxysmal atrial fibrillation ([facility] 12/3/2022 Yes    Overview Signed 3/19/2022 10:31 AM by Xavi, Convprob     Rare, fleeting palp in evening, nothing sustained.  No bleeding prob           HTN (hypertension), benign 12/3/2022 Yes    Long term (current) use of anticoagulants 12/3/2022 Yes    Bradycardia 12/3/2022 Yes    Overview Signed 8/16/2022  2:25 PM by Clara Soriano MD     Added automatically from request for surgery [id number]            Objective:   Patient Vitals for the past 24 hrs:   Temp Pulse Resp BP SpO2   12/05/22 0700 -- 64 26 130/60 91 %   12/05/22 0531 -- 68 -- -- 94 %   12/05/22 0516 -- 67 30 -- 93 %   12/05/22 0501 -- 67 26 128/63 93 %   12/05/22 0446 -- 67 26 -- 92 %   12/05/22 0431 -- 69 29 -- 92 %   12/05/22 0416 99.5 °F (37.5 °C) 70 29 -- 90 %   12/05/22 0401 -- 70 (!) 31 (!) 124/58 92 %   12/05/22 0346 -- 70 28 -- 91 %   12/05/22 0331 -- 70 30 -- 91 %   12/05/22 0316 -- 70 30 -- 92 %   12/05/22 0305 -- -- (!) 31 -- --   12/05/22 0301 -- 72 -- (!) 127/58 92 %   12/05/22 0246 -- 72 (!) 31 -- 91 %   12/05/22 0231 -- 71 (!) 39 -- 91 %   12/05/22 0201 (!) 100.7 °F (38.2 °C) 68 28 (!) 154/65 94 %   12/05/22 0146 -- 69 -- -- 93 %   12/05/22 0131 -- 66 30 -- 92 %   12/05/22 0116 -- 69 30 -- 92 %   12/05/22 0101 -- 66 28 128/62 93 %   12/05/22 0046 -- 67 -- -- 92 %   12/05/22 0031 -- 70 24 -- 92 %   12/05/22 0016 -- 68 26 -- 92 %   12/05/22 0001 -- 78 28 (!) 116/58 91 %   12/04/22 2346 -- 75 26 -- 92 %   12/04/22 2331 -- 77 27 -- 92 %   12/04/22 2316 -- 80 -- -- 91 %   12/04/22 2301 99.8 °F (37.7 °C) 67 -- (!) 126/59 94 %   12/04/22 2246 -- 68 -- -- 90 %   12/04/22 2231 -- 68 28 -- 95 %   12/04/22 2216 -- 63 28 -- 99 %   12/04/22 2201 -- 64 30 (!) 162/66 97 %   12/04/22 2146 -- 63 29 -- 97 %   12/04/22 2131 -- 63 29 -- 100 %   12/04/22 2116 -- 62 25 -- 100 %   12/04/22 2101 -- 62 28 (!) 122/58 98 %   12/04/22 2058 98.9 °F (37.2 °C) 64 20 (!) 117/54 --   12/04/22 2046 -- 64 -- -- 94 %   12/04/22 2031 -- 62 20 (!) 117/54 94 %   12/04/22 2016 -- 60 27 -- 99 %   12/04/22 2001 -- 60 23 -- 96 %   12/04/22 1946 -- 59 23 -- 94 %   12/04/22 1931 -- 60 25 -- 91 %   12/04/22 1916 -- 60 22 -- 92 %   12/04/22 1901 98.9 °F (37.2 °C) 61 30 -- 92 %   12/04/22 1845 -- 60 28 (!) 115/56 91 %   12/04/22 1800 -- 65 22 -- 94 %   12/04/22 1745 -- 65 24 (!) 107/52 94 %   12/04/22 1700 -- 67 22 (!) 111/55 93 %   12/04/22 1644 -- (!) 102 30 -- 94 %   12/04/22 1600 -- 63 24 (!) 171/70 96 %   12/04/22 1533 -- 63 22 -- 92 %   12/04/22 1500 -- 64 (!) 32 (!) 167/77 90 %   12/04/22 1454 -- 64 (!) 42 -- 92 %   12/04/22 1445 -- 65 (!) 36 (!) 163/70 94 %   12/04/22 1441 98.8 °F (37.1 °C) -- -- -- --   12/04/22 1400 -- 60 29 (!) 143/64 93 %   12/04/22 1300 -- 62 24 (!) 126/59 91 %   12/04/22 1200 -- 70 (!) 37 125/61 91 %   12/04/22 1154 -- -- 27 -- --   12/04/22 1126 -- -- -- -- 93 %   12/04/22 1100 98.5 °F (36.9 °C) 67 23 -- 92 %   12/04/22 1000 -- 67 (!) 36 -- 92 %   12/04/22 0953 -- -- 25 -- --   12/04/22 0923 -- 63 (!) 37 -- 93 %   12/04/22 0901 98.4 °F (36.9 °C) -- -- -- --   12/04/22 0900 -- 63 28 -- 93 %   12/04/22 0836 -- -- 27 -- --   12/04/22 0800 -- 63 (!) 36 -- 92 %       Estimated body mass index is 31.6 kg/m² as calculated from the following:    Height as of this encounter: 5' 6\" (1.676 m). Weight as of this encounter: 195 lb 12.3 oz (88.8 kg).     Intake/Output Summary (Last 24 hours) at 12/5/2022 0784  Last data filed at 12/5/2022 0501  Gross per 24 hour Intake 873.58 ml   Output 950 ml   Net -76.42 ml         Physical Exam:   Blood pressure 130/60, pulse 64, temperature 99.5 °F (37.5 °C), temperature source Axillary, resp. rate 26, height 5' 6\" (1.676 m), weight 195 lb 12.3 oz (88.8 kg), SpO2 91 %. General:    Well nourished. No overt distress. Head:  Normocephalic, atraumatic  Eyes:  Sclerae appear normal. Pupils equally round. ENT:  Nares appear normal, no drainage. Dry oral mucosa  Neck:  No restricted ROM. Trachea midline. CV:   RRR. No m/r/g. No jugular venous distension. Lungs:   Bilateral rales, no wheezes. Mild SOB at rest. Airvo 50L/90%, bedside sats low 90s. Abdomen: Bowel sounds present. Soft, nontender, nondistended. Extremities: No cyanosis or clubbing. No edema. Skin:     No rashes and normal coloration. Warm and dry. Neuro:  CN II-XII grossly intact. Sensation intact. A&Ox3  Psych:  Normal mood and affect.       I have reviewed ordered lab tests and independently visualized imaging below:    Recent Labs:  Recent Results (from the past 48 hour(s))   CBC with Auto Differential    Collection Time: 12/03/22  3:06 PM   Result Value Ref Range    WBC 9.7 4.3 - 11.1 K/uL    RBC 2.84 (L) 4.05 - 5.2 M/uL    Hemoglobin 8.1 (L) 11.7 - 15.4 g/dL    Hematocrit 26.6 (L) 35.8 - 46.3 %    MCV 93.7 82 - 102 FL    MCH 28.5 26.1 - 32.9 PG    MCHC 30.5 (L) 31.4 - 35.0 g/dL    RDW 17.8 (H) 11.9 - 14.6 %    Platelets 225 238 - 014 K/uL    MPV 9.5 9.4 - 12.3 FL    nRBC 0.00 0.0 - 0.2 K/uL    Differential Type AUTOMATED      Seg Neutrophils 77 43 - 78 %    Lymphocytes 10 (L) 13 - 44 %    Monocytes 6 4.0 - 12.0 %    Eosinophils % 6 0.5 - 7.8 %    Basophils 0 0.0 - 2.0 %    Immature Granulocytes 1 0.0 - 5.0 %    Segs Absolute 7.6 1.7 - 8.2 K/UL    Absolute Lymph # 0.9 0.5 - 4.6 K/UL    Absolute Mono # 0.6 0.1 - 1.3 K/UL    Absolute Eos # 0.6 0.0 - 0.8 K/UL    Basophils Absolute 0.0 0.0 - 0.2 K/UL    Absolute Immature Granulocyte 0.1 0.0 - 0.5 K/UL Comprehensive Metabolic Panel    Collection Time: 12/03/22  3:06 PM   Result Value Ref Range    Sodium 139 133 - 143 mmol/L    Potassium 3.2 (L) 3.5 - 5.1 mmol/L    Chloride 103 101 - 110 mmol/L    CO2 31 21 - 32 mmol/L    Anion Gap 5 2 - 11 mmol/L    Glucose 128 (H) 65 - 100 mg/dL    BUN 20 8 - 23 MG/DL    Creatinine 0.80 0.6 - 1.0 MG/DL    Est, Glom Filt Rate >60 >60 ml/min/1.73m2    Calcium 9.3 8.3 - 10.4 MG/DL    Total Bilirubin 1.0 0.2 - 1.1 MG/DL    ALT 29 12 - 65 U/L    AST 40 (H) 15 - 37 U/L    Alk Phosphatase 128 50 - 136 U/L    Total Protein 6.3 6.3 - 8.2 g/dL    Albumin 2.7 (L) 3.2 - 4.6 g/dL    Globulin 3.6 2.8 - 4.5 g/dL    Albumin/Globulin Ratio 0.8 0.4 - 1.6     Magnesium    Collection Time: 12/03/22  3:06 PM   Result Value Ref Range    Magnesium 2.0 1.8 - 2.4 mg/dL   Troponin    Collection Time: 12/03/22  3:06 PM   Result Value Ref Range    Troponin, High Sensitivity 14.6 (H) 0 - 14 pg/mL   Lactic Acid    Collection Time: 12/03/22  3:06 PM   Result Value Ref Range    Lactic Acid, Plasma 1.4 0.4 - 2.0 MMOL/L   Brain Natriuretic Peptide    Collection Time: 12/03/22  3:06 PM   Result Value Ref Range    NT Pro-BNP 2,517 (H) <450 PG/ML   Transferrin Saturation    Collection Time: 12/03/22  3:06 PM   Result Value Ref Range    Iron 20 (L) 35 - 150 ug/dL    TIBC 206 (L) 250 - 450 ug/dL    TRANSFERRIN SATURATION 10 (L) >20 %   Procalcitonin    Collection Time: 12/03/22  3:06 PM   Result Value Ref Range    Procalcitonin 0.11 0.00 - 0.49 ng/mL   Arterial Blood Gas, POC    Collection Time: 12/03/22  3:13 PM   Result Value Ref Range    DEVICE Non rebreather      FIO2 100 %    pH, Arterial, POC 7.46 (H) 7.35 - 7.45      pCO2, Arterial, POC 39.5 35 - 45 MMHG    pO2, Arterial,  (H) 75 - 100 MMHG    HCO3, Mixed 27.8 (H) 22 - 26 MMOL/L    SO2c, Arterial, POC 99.0 (H) 95 - 98 %    Base Excess 3.7 mmol/L    POC Thomas's Test Positive      Site LEFT RADIAL      Specimen type: ARTERIAL      Performed by: Adame (Wallace)    EKG 12 Lead    Collection Time: 12/03/22  3:19 PM   Result Value Ref Range    Ventricular Rate 72 BPM    Atrial Rate 73 BPM    P-R Interval 204 ms    QRS Duration 94 ms    Q-T Interval 409 ms    QTc Calculation (Bazett) 448 ms    R Axis 27 degrees    T Axis 13 degrees    Diagnosis Atrial-paced rhythm    Troponin    Collection Time: 12/03/22  5:16 PM   Result Value Ref Range    Troponin, High Sensitivity 16.3 (H) 0 - 14 pg/mL   COVID-19, Rapid    Collection Time: 12/03/22  6:34 PM    Specimen: Nasopharyngeal   Result Value Ref Range    Source NASAL      SARS-CoV-2, Rapid Not detected NOTD     Comprehensive Metabolic Panel w/ Reflex to MG    Collection Time: 12/04/22  3:46 AM   Result Value Ref Range    Sodium 139 133 - 143 mmol/L    Potassium 3.0 (L) 3.5 - 5.1 mmol/L    Chloride 99 (L) 101 - 110 mmol/L    CO2 35 (H) 21 - 32 mmol/L    Anion Gap 5 2 - 11 mmol/L    Glucose 102 (H) 65 - 100 mg/dL    BUN 17 8 - 23 MG/DL    Creatinine 0.90 0.6 - 1.0 MG/DL    Est, Glom Filt Rate >60 >60 ml/min/1.73m2    Calcium 9.2 8.3 - 10.4 MG/DL    Total Bilirubin 1.2 (H) 0.2 - 1.1 MG/DL    ALT 26 12 - 65 U/L    AST 41 (H) 15 - 37 U/L    Alk Phosphatase 133 50 - 136 U/L    Total Protein 6.1 (L) 6.3 - 8.2 g/dL    Albumin 2.5 (L) 3.2 - 4.6 g/dL    Globulin 3.6 2.8 - 4.5 g/dL    Albumin/Globulin Ratio 0.7 0.4 - 1.6     CBC with Auto Differential    Collection Time: 12/04/22  3:46 AM   Result Value Ref Range    WBC 9.0 4.3 - 11.1 K/uL    RBC 2.79 (L) 4.05 - 5.2 M/uL    Hemoglobin 7.8 (L) 11.7 - 15.4 g/dL    Hematocrit 26.2 (L) 35.8 - 46.3 %    MCV 93.9 82 - 102 FL    MCH 28.0 26.1 - 32.9 PG    MCHC 29.8 (L) 31.4 - 35.0 g/dL    RDW 18.1 (H) 11.9 - 14.6 %    Platelets 665 195 - 399 K/uL    MPV 9.7 9.4 - 12.3 FL    nRBC 0.00 0.0 - 0.2 K/uL    Differential Type AUTOMATED      Seg Neutrophils 79 (H) 43 - 78 %    Lymphocytes 12 (L) 13 - 44 %    Monocytes 5 4.0 - 12.0 %    Eosinophils % 3 0.5 - 7.8 %    Basophils 0 0.0 - 2.0 %    Immature Granulocytes 1 0.0 - 5.0 %    Segs Absolute 7.0 1.7 - 8.2 K/UL    Absolute Lymph # 1.1 0.5 - 4.6 K/UL    Absolute Mono # 0.5 0.1 - 1.3 K/UL    Absolute Eos # 0.3 0.0 - 0.8 K/UL    Basophils Absolute 0.0 0.0 - 0.2 K/UL    Absolute Immature Granulocyte 0.1 0.0 - 0.5 K/UL   Magnesium    Collection Time: 12/04/22  3:46 AM   Result Value Ref Range    Magnesium 1.6 (L) 1.8 - 2.4 mg/dL   Brain Natriuretic Peptide    Collection Time: 12/04/22  3:46 AM   Result Value Ref Range    NT Pro-BNP 2,755 (H) <450 PG/ML   Respiratory Panel, Molecular, with COVID-19 (Restricted: peds pts or suitable admitted adults)    Collection Time: 12/04/22  9:01 AM    Specimen: Nasopharyngeal   Result Value Ref Range    Adenovirus by PCR NOT DETECTED NOTDET      Coronavirus 229E by PCR NOT DETECTED NOTDET      Coronavirus HKU1 by PCR NOT DETECTED NOTDET      Coronavirus NL63 by PCR NOT DETECTED NOTDET      Coronavirus OC43 by PCR NOT DETECTED NOTDET      SARS-CoV-2, PCR NOT DETECTED NOTDET      Human Metapneumovirus by PCR NOT DETECTED NOTDET      Rhinovirus Enterovirus PCR NOT DETECTED NOTDET      Influenza A by PCR NOT DETECTED NOTDET      Influenza B PCR NOT DETECTED NOTDET      Parainfluenza 1 PCR NOT DETECTED NOTDET      Parainfluenza 2 PCR NOT DETECTED NOTDET      Parainfluenza 3 PCR NOT DETECTED NOTDET      Parainfluenza 4 PCR NOT DETECTED NOTDET      Respiratory Syncytial Virus by PCR NOT DETECTED NOTDET      Bordetella parapertussis by PCR NOT DETECTED NOTDET      Bordetella pertussis by PCR NOT DETECTED NOTDET      Chlamydophila Pneumonia PCR NOT DETECTED NOTDET      Mycoplasma pneumo by PCR NOT DETECTED NOTDET     Transthoracic echocardiogram (TTE) complete with contrast, bubble, strain, and 3D PRN    Collection Time: 12/04/22 12:30 PM   Result Value Ref Range    LV EDV A2C 70 mL    LV EDV A4C 82 mL    LV ESV A2C 43 mL    LV ESV A4C 45 mL    IVSd 1.0 (A) 0.6 - 0.9 cm    LVIDd 5.2 3.9 - 5.3 cm    LVIDs 3.2 cm    LVOT Diameter 2.0 cm    LVOT Mean Gradient 3 mmHg    LVOT VTI 30.7 cm    LVOT Peak Velocity 1.4 m/s    LVOT Peak Gradient 8 mmHg    LVPWd 0.9 0.6 - 0.9 cm    LV E' Lateral Velocity 10 cm/s    LV E' Septal Velocity 7 cm/s    LV Ejection Fraction A2C 39 %    LV Ejection Fraction A4C 46 %    EF BP 43 (A) 55 - 100 %    LVOT Area 3.1 cm2    LVOT SV 96.4 ml    LA Minor Axis 5.6 cm    LA Major Axis 6.9 cm    LA Area 2C 26.3 cm2    LA Area 4C 18.0 cm2    LA Volume 2C 100 (A) 22 - 52 mL    LA Volume 4C 35 22 - 52 mL    LA Volume BP 65 (A) 22 - 52 mL    LA Diameter 2.6 cm    AV Mean Velocity 1.2 m/s    AV Mean Gradient 7 mmHg    AV VTI 38.1 cm    AV Peak Velocity 1.8 m/s    AV Peak Gradient 12 mmHg    AV Area by VTI 2.5 cm2    AV Area by Peak Velocity 2.5 cm2    Aortic Root 3.2 cm    Ascending Aorta 3.0 cm    IVC Proxmal 1.9 cm    MV E Wave Deceleration Time 241.0 ms    MV A Velocity 0.94 m/s    MV E Velocity 0.86 m/s    PV Max Velocity 1.0 m/s    PV Peak Gradient 4 mmHg    Est. RA Pressure 3 mmHg    RV Basal Dimension 4.2 cm    RV Free Wall Peak S' 18 cm/s    TR Max Velocity 2.87 m/s    TR Peak Gradient 33 mmHg    Body Surface Area 2.01 m2    Fractional Shortening 2D 38 28 - 44 %    LV ESV Index A4C 23 mL/m2    LV EDV Index A4C 41 mL/m2    LV ESV Index A2C 22 mL/m2    LV EDV Index A2C 35 mL/m2    LVIDd Index 2.63 cm/m2    LVIDs Index 1.62 cm/m2    LV RWT Ratio 0.35     LV Mass 2D 181.4 (A) 67 - 162 g    LV Mass 2D Index 91.6 43 - 95 g/m2    MV E/A 0.91     E/E' Ratio (Averaged) 10.44     E/E' Lateral 8.60     E/E' Septal 12.29     LA Volume Index BP 33 16 - 34 ml/m2    LVOT Stroke Volume Index 48.7 mL/m2    LA Volume Index 2C 51 (A) 16 - 34 mL/m2    LA Volume Index 4C 18 16 - 34 mL/m2    LA Size Index 1.31 cm/m2    LA/AO Root Ratio 0.81     Ao Root Index 1.62 cm/m2    Ascending Aorta Index 1.52 cm/m2    AV Velocity Ratio 0.78     LVOT:AV VTI Index 0.81     MEE/BSA VTI 1.3 cm2/m2    MEE/BSA Peak Velocity 1.3 cm2/m2    RVSP 36 mmHg Iron    Collection Time: 12/04/22  1:43 PM   Result Value Ref Range    Iron 93 35 - 150 ug/dL   Magnesium    Collection Time: 12/04/22  1:43 PM   Result Value Ref Range    Magnesium 2.2 1.8 - 2.4 mg/dL   Potassium    Collection Time: 12/04/22  1:43 PM   Result Value Ref Range    Potassium 3.5 3.5 - 5.1 mmol/L   Basic Metabolic Panel w/ Reflex to MG    Collection Time: 12/05/22  3:12 AM   Result Value Ref Range    Sodium 136 133 - 143 mmol/L    Potassium 3.5 3.5 - 5.1 mmol/L    Chloride 102 101 - 110 mmol/L    CO2 29 21 - 32 mmol/L    Anion Gap 5 2 - 11 mmol/L    Glucose 97 65 - 100 mg/dL    BUN 22 8 - 23 MG/DL    Creatinine 0.80 0.6 - 1.0 MG/DL    Est, Glom Filt Rate >60 >60 ml/min/1.73m2    Calcium 9.1 8.3 - 10.4 MG/DL   CBC    Collection Time: 12/05/22  3:12 AM   Result Value Ref Range    WBC 10.1 4.3 - 11.1 K/uL    RBC 2.63 (L) 4.05 - 5.2 M/uL    Hemoglobin 7.3 (L) 11.7 - 15.4 g/dL    Hematocrit 24.6 (L) 35.8 - 46.3 %    MCV 93.5 82 - 102 FL    MCH 27.8 26.1 - 32.9 PG    MCHC 29.7 (L) 31.4 - 35.0 g/dL    RDW 18.2 (H) 11.9 - 14.6 %    Platelets 002 743 - 989 K/uL    MPV 9.3 (L) 9.4 - 12.3 FL    nRBC 0.00 0.0 - 0.2 K/uL   Magnesium    Collection Time: 12/05/22  3:12 AM   Result Value Ref Range    Magnesium 1.8 1.8 - 2.4 mg/dL   Group A Strep Screen By PCR    Collection Time: 12/05/22  4:43 AM    Specimen: Swab   Result Value Ref Range    Strep, Molecular Not detected NOTD           Other Studies:  XR CHEST PORTABLE    Result Date: 12/5/2022  EXAMINATION: One view chest HISTORY: F/U pulmonary edema TECHNIQUE: Frontal chest. COMPARISON: 12/4/2022 FINDINGS:  Stable left-sided cardiac device. Persistent bilateral lung infiltrates. No significant interval change. There is no significant pneumothorax. There is no pleural effusion. The heart is unchanged. No other significant interval changes. 1. Findings as described above.       XR CHEST PORTABLE    Result Date: 12/4/2022  EXAMINATION: XR CHEST PORTABLE 12/4/2022 8:44 AM ACCESSION NUMBER: [id number] COMPARISON: Chest radiograph 12/3/2022 and earlier. INDICATION: shortness of breath TECHNIQUE: A single view of the chest was obtained. FINDINGS: Unchanged positioning of left chest wall pacer leads. Similar-appearing bilateral heterogeneous airspace opacities. No pneumothorax or sizable pleural effusion. Stable cardiomediastinal silhouette.     -Similar-appearing bilateral pulmonary opacities, which may reflect pulmonary edema or pneumonia. XR CHEST PORTABLE    Result Date: 12/3/2022  Portable chest x-ray CLINICAL INDICATION: Shortness of breath FINDINGS: Single AP view of the chest compared to a similar exam dated 11/17/2022 shows worsening bilateral reticular nodular densities throughout the mid and lower lungs. A pacer device in place. There is no pleural effusion. There is no acute infarct. Worsening bilateral pulmonary edema or atypical pneumonia pattern. Transthoracic echocardiogram (TTE) complete with contrast, bubble, strain, and 3D PRN    Result Date: 12/4/2022    Left Ventricle: Normal left ventricular systolic function with a visually estimated EF of 55 - 60%. Left ventricle size is normal. Normal wall thickness. Normal wall motion. Normal diastolic function. Technical qualifiers: Color flow Doppler was performed and pulse wave and/or continuous wave Doppler was performed.        Current Meds:  Current Facility-Administered Medications   Medication Dose Route Frequency    magnesium sulfate 2000 mg in 50 mL IVPB premix  2,000 mg IntraVENous PRN    potassium chloride (KLOR-CON M) extended release tablet 40 mEq  40 mEq Oral BID WC    magnesium sulfate 2000 mg in 50 mL IVPB premix  2,000 mg IntraVENous Once    doxycycline (VIBRAMYCIN) 100 mg in sodium chloride 0.9 % 100 mL IVPB  100 mg IntraVENous Q12H    sodium chloride flush 0.9 % injection 5 mL  5 mL IntraVENous Q8H    sodium chloride flush 0.9 % injection 10 mL  10 mL IntraVENous PRN    amitriptyline (ELAVIL) tablet 25 mg  25 mg Oral Nightly    diclofenac sodium (VOLTAREN) 1 % gel 1 g  1 g Topical 4x Daily PRN    apixaban (ELIQUIS) tablet 5 mg  5 mg Oral BID    ferrous sulfate (IRON 325) tablet 325 mg  325 mg Oral Daily    flecainide (TAMBOCOR) tablet 75 mg  75 mg Oral BID    FLUoxetine (PROZAC) capsule 60 mg  60 mg Oral Daily    hydrOXYzine HCl (ATARAX) tablet 25 mg  25 mg Oral Nightly PRN    levothyroxine (SYNTHROID) tablet 50 mcg  50 mcg Oral QAM AC    metoprolol succinate (TOPROL XL) extended release tablet 50 mg  50 mg Oral Daily    pantoprazole (PROTONIX) tablet 40 mg  40 mg Oral QAM AC    timolol (TIMOPTIC) 0.5 % ophthalmic solution 1 drop  1 drop Both Eyes Daily    mometasone-formoterol (DULERA) 200-5 MCG/ACT inhaler 2 puff  2 puff Inhalation BID    LORazepam (ATIVAN) tablet 1 mg  1 mg Oral Q12H PRN    HYDROcodone-acetaminophen (NORCO) 7.5-325 MG per tablet 1 tablet  1 tablet Oral Q6H PRN    sennosides-docusate sodium (SENOKOT-S) 8.6-50 MG tablet 2 tablet  2 tablet Oral Nightly    albuterol (PROVENTIL) nebulizer solution 2.5 mg  2.5 mg Nebulization Q8H PRN    sodium chloride flush 0.9 % injection 5-40 mL  5-40 mL IntraVENous 2 times per day    sodium chloride flush 0.9 % injection 5-40 mL  5-40 mL IntraVENous PRN    0.9 % sodium chloride infusion   IntraVENous PRN    polyethylene glycol (GLYCOLAX) packet 17 g  17 g Oral Daily PRN    acetaminophen (TYLENOL) tablet 650 mg  650 mg Oral Q6H PRN    Or    acetaminophen (TYLENOL) suppository 650 mg  650 mg Rectal Q6H PRN    tuberculin injection 5 Units  5 Units IntraDERmal Once       Signed:  Clara Melton MD    Part of this note may have been written by using a voice dictation software. The note has been proof read but may still contain some grammatical/other typographical errors.

## 2022-12-05 NOTE — PROGRESS NOTES
ACUTE PHYSICAL THERAPY GOALS:   (Developed with and agreed upon by patient and/or caregiver. )  LTG:  (1.)Ms. Tamir Jones will move from supine to sit and sit to supine , scoot up and down, and roll side to side in bed with INDEPENDENT within 7 treatment day(s). (2.)Ms. Tamir Jones will transfer from bed to chair and chair to bed with STAND BY ASSIST using the least restrictive device within 7 treatment day(s). (3.)Ms. Tamir Jones will ambulate with STAND BY ASSIST for 150+ feet with the least restrictive device within 7 treatment day(s) while maintaining normal vital signs. (4.)Ms. Tamir Jones will perform 2 stairs with HR and CGA within 7 treatment days for ascending and descending stairs for home. (5.)Ms. Tamir Jones will tolerate 23+ minutes of therapeutic activity within 7 treatment days while maintaining SPO2 >90% throughout for increased activity tolerance and strength. PHYSICAL THERAPY: Daily Note AM   (Link to Caseload Tracking: PT Visit Days : 2  Time In/Out PT Charge Capture  Rehab Caseload Tracker  Orders    Ran Wood is a 82 y.o. female   PRIMARY DIAGNOSIS: Acute respiratory failure ([facility] [address]  Acute respiratory failure (HCC) [J96.00]  Acute congestive heart failure, unspecified heart failure type ([address] [I50.9]       Inpatient: Payor: MEDICARE / Plan: MEDICARE PART A AND B / Product Type: *No Product type* /     ASSESSMENT:     REHAB RECOMMENDATIONS:   Recommendation to date pending progress:  Setting:  Short-term Rehab    Equipment:    To Be Determined     ASSESSMENT:  Ms. Tamir Jones was supine in bed on Airvo and agreeable to therapy. Pt was able to come to sitting on EOB today with Nelly x 2 and good sitting balance. Pt was able to transfer to recliner with Nelly x 2. PT desaturated to 80% on Airvo and required extended rest break for recovering. Pt was able to participated in seated activities as well with supervision and frequent rest breaks. No significant progress today.      SUBJECTIVE:   Ms. Guanakito Rangel states, \"Okay\"     Social/Functional Lives With: Spouse  Type of Home: House  Home Layout: One level  Home Access: Stairs to enter with rails  Entrance Stairs - Number of Steps: 2  Home Equipment: Cane  ADL Assistance: Independent  Active : Yes  OBJECTIVE:     PAIN: [place] Ricky / O2: Natalia Drown / Shantel Blake / Redia Murphy:   Pre Treatment:   Pain Assessment: None - Denies Pain      Post Treatment: 0 Vitals        Oxygen    Continuous Pulse Oximetry, IV, Purewick, and Telemetry     RESTRICTIONS/PRECAUTIONS:  Restrictions/Precautions  Restrictions/Precautions: Fall Risk  Restrictions/Precautions: Fall Risk     MOBILITY: I Mod I S SBA CGA Min Mod Max Total  NT x2 Comments:   Bed Mobility    Rolling [] [] [] [] [] [x] [] [] [] [] [x]    Supine to Sit [] [] [] [] [] [x] [] [] [] [] [x]    Scooting [] [] [] [] [] [] [] [] [] [] []    Sit to Supine [] [] [] [] [] [] [] [] [] [] []    Transfers    Sit to Stand [] [] [] [] [] [x] [] [] [] [] [x]    Bed to Chair [] [] [] [] [] [x] [] [] [] [] [x]    Stand to Sit [] [] [] [] [] [x] [] [] [] [] [x]     [] [] [] [] [] [] [] [] [] [] []    I=Independent, Mod I=Modified Independent, S=Supervision, SBA=Standby Assistance, CGA=Contact Guard Assistance,   Min=Minimal Assistance, Mod=Moderate Assistance, Max=Maximal Assistance, Total=Total Assistance, NT=Not Tested    BALANCE: Good Fair+ Fair Fair- Poor NT Comments   Sitting Static [x] [] [] [] [] []    Sitting Dynamic [] [x] [] [] [] []              Standing Static [] [x] [] [] [] []    Standing Dynamic [] [] [x] [] [] []      GAIT: I Mod I S SBA CGA Min Mod Max Total  NT x2 Comments:   Level of Assistance [] [] [] [] [] [x] [] [] [] [] [x]    Distance   feet    DME B HHA    Gait Quality Decreased step length    Weightbearing Status      Stairs      I=Independent, Mod I=Modified Independent, S=Supervision, SBA=Standby Assistance, CGA=Contact Guard Assistance,   Min=Minimal Assistance, Mod=Moderate Assistance, Max=Maximal Assistance, Total=Total Assistance, NT=Not Tested    PLAN:   FREQUENCY AND DURATION: 3 times/week for duration of hospital stay or until stated goals are met, whichever comes first.    TREATMENT:   TREATMENT:   Co-Treatment PT/OT necessary due to patient's decreased overall endurance/tolerance levels, as well as need for high level skilled assistance to complete functional transfers/mobility and functional tasks  Therapeutic Activity (23 Minutes): Therapeutic activity included Rolling, Supine to Sit, Scooting, Transfer Training, Ambulation on level ground, and Standing balance to improve functional Activity tolerance, Balance, Mobility, and Strength. TREATMENT GRID:  N/A    AFTER TREATMENT PRECAUTIONS: Bed/Chair Locked, Call light within reach, Chair, Needs within reach, RN notified, and Visitors at bedside    INTERDISCIPLINARY COLLABORATION:  RN/ PCT, PT/ PTA, and OT/ RAI    EDUCATION:      TIME IN/OUT:  Time In: 1148  Time Out: [address]  Minutes: [address]  Michaelle Sandifer

## 2022-12-05 NOTE — PROGRESS NOTES
Physician Progress Note      Stella Weston  CSN #:                  [id number]  :                       1935  ADMIT DATE:       12/3/2022 2:56 PM  [address] [place] DATE:  Gisella Sawyer  PROVIDER #:        Kari Arevalo MD          QUERY TEXT:    Pt admitted with Acute resp failure  and has Acute CHF documented. If   possible, please document in progress notes and discharge summary further   specificity regarding the type of CHF:      The medical record reflects the following:  Risk Factors: Hx of CHF, HTN, Afib, Acute resp failure  Clinical Indicators: BNP 2500, bilat LE edema, cxray-- pulm edema vs atypical   PNA ,  echo--ef--55-60%, SOB, resps 22-39. Breathing improving with diuresis  Treatment: iv lasix, nitro drip, heated hiflo, echo, cxray, strict I&O's,   daily weights, labs, essential home meds. Options provided:  -- Acute on Chronic Systolic CHF/HFrEF  -- Acute on Chronic Diastolic CHF/HFpEF  -- Acute on Chronic Systolic and Diastolic CHF  -- Acute Systolic CHF/HFrEF  -- Acute Diastolic CHF/HFpEF  -- Acute Systolic and Diastolic CHF  -- Other - I will add my own diagnosis  -- Disagree - Not applicable / Not valid  -- Disagree - Clinically unable to determine / Unknown  -- Refer to Clinical Documentation Reviewer    PROVIDER RESPONSE TEXT:    Not chf    Query created by: Domi Glynn on 2022 8:44 AM      QUERY TEXT:    Patient admitted with Acute respiratory failure. Noted to have  atrial   fibrillation and is maintained on Eliquis . If possible, please document in   progress notes and discharge summary if you are evaluating and/or treating any   of the following: The medical record reflects the following:  Risk Factors: Afib, HTN, ? CHF  Clinical Indicators: Afib maintained on Eliquis-- EKG--a paced  Treatment: continuation of eliquis, tele, ICU care, labs, other essential home   meds.   Options provided:  -- Secondary hypercoagulable state in a patient with atrial fibrillation  -- Other - I will add my own diagnosis  -- Disagree - Not applicable / Not valid  -- Disagree - Clinically unable to determine / Unknown  -- Refer to Clinical Documentation Reviewer    PROVIDER RESPONSE TEXT:    This patient has secondary hypercoagulable state in a patient with atrial   fibrillation. Query created by: Priscila Weaver on 12/5/2022 8:46 AM      Electronically signed by:   Gordon Brenner MD 12/5/2022 9:53 AM

## 2022-12-05 NOTE — PROGRESS NOTES
A follow up visit was made to the patient. Emotional support, spiritual presence and   prayer were provided for the patient. Her  and daughter were present.       Jalen Carlos, [address] [facility], [facility]

## 2022-12-05 NOTE — PROGRESS NOTES
[facility] Critical Care Note[de-identified] 12/5/2022  Fernando Russell  Admission Date: 12/3/2022     Length of Stay: 2 days    Background: 82 y.o. female with a history of SSS s/p PCM, PAF on eliquis, recent CAP, obesity, and DVT. Presented to ER 12/3 with increased dyspnea, edema, orthopnea. Sat 53% on arrival. Normal WBC, BNP 2517, CXR with interstitial infiltrates. Admitted to ICU and diuresed. Placed on CPAP. TTE was normal. RVP was normal.       Notable PMH:  has a past medical history of Chest pain, Dyspnea, GERD (gastroesophageal reflux disease), Headache, HTN (hypertension), benign, Malaise and fatigue, Paroxysmal atrial fibrillation ([address] Preoperative cardiovascular examination, Psychiatric disorder, PUD (peptic ulcer disease), Thromboembolus ([address] and Thyroid disease. 24 Hour events: Pt had temp up to 100.7 this morning. Net even over last 24 hours. States that she generally feels worse today than yesterday. On 90% airvo. Ongoing dry cough. States her breathing feels ok. Review of Systems: Comprehensive ROS negative except in HPI    Lines: (insertion date)    External Urinary Catheter (Active)      Drips: current dose (range)        Pertinent Exam:         Blood pressure 130/60, pulse 64, temperature 99.5 °F (37.5 °C), temperature source Axillary, resp. rate 26, height 5' 6\" (1.676 m), weight 195 lb 12.3 oz (88.8 kg), SpO2 91 %. Intake/Output Summary (Last 24 hours) at 12/5/2022 0708  Last data filed at 12/5/2022 0501  Gross per 24 hour   Intake 873.58 ml   Output 950 ml   Net -76.42 ml     Constitutional:  NAD, resting in bed  EENMT:  Sclera clear, pupils equal, oral mucosa moist  Respiratory: CTA B in anterior lung fields. Cardiovascular:  RRR, no m/r/g  Gastrointestinal:  soft with no tenderness; positive bowel sounds present  Musculoskeletal:  warm with no cyanosis, no lower extremity edema  Skin:  no jaundice or ecchymosis  Neurologic: alert and oriented.  Equal strength and sensation in all extremities. Psychiatric: calm. CXR: 12/5/22      Recent Labs     12/03/22  1506 12/04/22  0346 12/05/22  0312   WBC 9.7 9.0 10.1   HGB 8.1* 7.8* 7.3*   HCT 26.6* 26.2* 24.6*    256 249   PROCAL 0.11  --   --      Recent Labs     12/03/22  1506 12/04/22  0346 12/04/22  1343 12/05/22  0312    139  --  136   K 3.2* 3.0* 3.5 3.5    99*  --  102   CO2 31 35*  --  29   GLUCOSE 128* 102*  --  97   BUN 20 17  --  22   CREATININE 0.80 0.90  --  0.80   MG 2.0 1.6* 2.2 1.8   BILITOT 1.0 1.2*  --   --    AST 40* 41*  --   --    ALT 29 26  --   --    ALKPHOS 128 133  --   --      Recent Labs     12/03/22  1506 12/03/22  1716 12/04/22  0346   TROPHS 14.6* 16.3*  --    NTPROBNP 2,517*  --  2,755*     Recent Labs     12/03/22  1506 12/04/22  0346 12/05/22  0312   GLUCOSE 128* 102* 97      ECHO: 12/03/22    TRANSTHORACIC ECHOCARDIOGRAM (TTE) COMPLETE (CONTRAST/BUBBLE/3D PRN) 12/04/2022  1:05 PM (Final)    Interpretation Summary    Left Ventricle: Normal left ventricular systolic function with a visually estimated EF of 55 - 60%. Left ventricle size is normal. Normal wall thickness. Normal wall motion. Normal diastolic function. Technical qualifiers: Color flow Doppler was performed and pulse wave and/or continuous wave Doppler was performed. Signed by: Mila Franklin MD on 12/4/2022  1:05 PM    Microbiology:   No results for input(s): CULTURE in the last 72 hours. Ventilator Settings Ideal body weight: 59.3 kg (130 lb 11.7 oz)  Adjusted ideal body weight: 71.1 kg (156 lb 12 oz)  Mode FIO2 Rate Tidal Volume Pressure        80 %                  Peak airway pressure:     Minute ventilation:    ABG:  Recent Labs     12/03/22  1513   PHAPOC 7.46*   UUC7BLWL 39.5   AK2WOQK 126*   UQN7EWA 27.8*   BE 3.7     Assessment and Plan:  (Medical Decision Making)   Impression: 82 y.o. female with with history of SSS s/p pacer, a fib. Presenting with acute hypoxic respiratory failure.  Initially felt to be due to volume overload but now appearing euvolemic and CXR not changes, ongoing high O2 needs, and now with fever. NEURO:   Sedation: none  Analgesia: none  CV:   Volume Status: appears euvolemic. Getting lasix 40 IV bid. Creatinine stable. Continue for now but does not look particularly volume overloaded. A fib: currently in NSR. Continue eliquis, metoprolol, and flecainide  PULM:   Acute hypoxemic respiratory failure:  patient still on 90% airvo. Unclear cause of infiltrates but seem more likely due to infectious/inflammatory process at this point. Will obtain CT scan. Currently on doxy. May need to broaden abx or consider steroids if feel this is more likely inflammatory. Getting dulera but no wheeze on exam.   RENAL:  Electrolytes: replace as needed  GI:   Nutrition: PO  HEME:   Anemia: hgb drifting down to 7's today. No obvious bleeding. On eliquis for a fib. Continue for now and monitor. Anticoagulation: eliquis  ID:   PNA: based on CXR with infiltrates and fever. WBC normal. RVP negative. Pt not producing sputum for culture. ER visit 11/17 with similar symptoms and CXR appearance. ENDO:   Hypothyroid: cont synthroid  Skin: no decub, turns, preventive care  Prophy: protonix and eliquis. Full Code    The patient is critically ill with respiratory failure, circulatory failure and requires high complexity decision making for assessment and support including frequent ventilator adjustment, frequent evaluation and titration of therapies , application of advanced monitoring technologies and extensive interpretation of multiple databases    Cumulative time devoted to patient care services by me for day of service is 32 mins.     Zena Tolbert MD

## 2022-12-05 NOTE — PROGRESS NOTES
ACUTE OCCUPATIONAL THERAPY GOALS:   (Developed with and agreed upon by patient and/or caregiver.)  1. Patient will complete lower body bathing and dressing with supervision and adaptive equipment as needed. 2. Patient will complete toileting with supervision. 3. Patient will tolerate 30 minutes of OT treatment with 2-3 rest breaks to increase activity tolerance for ADLs. 4. Patient will complete functional transfers with supervision and adaptive equipment as needed. 5. Patient will complete functional mobility for household distances with supervision and adaptive equipment as needed. 6. Patient will complete self-grooming while standing edge of sink with supervision and adaptive equipment as needed. Timeframe: 7 visits       OCCUPATIONAL THERAPY Initial Assessment, Daily Note, and AM       OT Visit Days: 1   Acknowledge Orders  Time  OT Charge Capture  Rehab Caseload Tracker      Charles Pagan is a 82 y.o. female   PRIMARY DIAGNOSIS: Acute respiratory failure ([facility] [address]  Acute respiratory failure (HCC) [J96.00]  Acute congestive heart failure, unspecified heart failure type ([facility] [address] [I50.9]       Reason for Referral: Generalized Muscle Weakness (M62.81)  Inpatient: Payor: MEDICARE / Plan: MEDICARE PART A AND B / Product Type: *No Product type* /     ASSESSMENT:     REHAB RECOMMENDATIONS:   Recommendation to date pending progress:  Setting:  Short-term Rehab    Equipment:    To Be Determined     ASSESSMENT:  Ms. Ardyce Bamberger presents with deficits in overall strength, activity tolerance, ADL performance, and functional mobility. Presents in ICU for ARF and CHF. Resting on Airvo at 95%. BUE (5/5) are generally decreased but WFL. Min A x 2  for functional bed mobility/supine <> sit transfer to EOB; intact EOB unsupported sitting balance. Sp02 dropping to high 80s however recovering well with rest and diaphragmatic breathing.  Proceeded to complete sit <> stand and steps to bedside chair with min A x 2; fair - standing balance/unsteady with mobility to chair. Seated rest break provided while working on energy conservation techniques. Patient proceeded to complete self-grooming tasks with SBA. Tolerated session fair today however Sp02 dropping with activity. At this time, Sheryle Aris is functioning below baseline for ADL performance and functional mobility. Patient would benefit from skilled OT services to address goals and plan of care. [address] [facility] [place] [address] [address] AM-PAC 6 Clicks Daily Activity Inpatient Short Form:    AM-PAC Daily Activity Inpatient   How much help for putting on and taking off regular lower body clothing?: A Lot  How much help for Bathing?: A Lot  How much help for Toileting?: A Lot  How much help for putting on and taking off regular upper body clothing?: A Little  How much help for taking care of personal grooming?: A Little  How much help for eating meals?: A Little  AM-[facility] Inpatient Daily Activity Raw Score: 15  AM-PAC Inpatient ADL T-Scale Score : 34.69  ADL Inpatient CMS 0-100% Score: 56.46  ADL Inpatient CMS G-Code Modifier : CK           SUBJECTIVE:     Ms. Vickki Babinski states, \"Well thank you. \"     Social/Functional Lives With: Spouse  Type of Home: House  Home Layout: One level  Home Access: Stairs to enter with rails  Entrance Stairs - Number of Steps: 2  Home Equipment: Cane  ADL Assistance: Independent  Active : Yes  Lives with spouse in [address] home with 2 steps to enter. Independent at baseline for ADLs and functional mobility however over the last 3 weeks patient noted use of RW d/t ongoing weakness and SOB.    OBJECTIVE:     Melaniehaley Peña / Aaron Gunning / AIRWAY:  Oliver Brandie    RESTRICTIONS/PRECAUTIONS:  Restrictions/Precautions: Fall Risk    PAIN: VITALS / O2:   Pre Treatment:       Numeric 0-10  0/10    Post Treatment:   0/10       Vitals        WFL    Oxygen     Airvo 95%       GROSS EVALUATION: INTACT IMPAIRED   (See Comments)   UE AROM [x] []   UE PROM [x] []   Strength []  Generally decreased; BUE WFL     Posture / Balance []    Fair + unsupported EOB sitting balance  Fair+ static standing balance  fair dynamic standing balance   Sensation [x]     Coordination [x]       Tone [x]       Edema [x]    Activity Tolerance []    On airvo at 95%; desat to low 80s with activity      Hand Dominance R [] L []      COGNITION/  PERCEPTION: INTACT IMPAIRED   (See Comments)   Orientation [x]     Vision [x]     Hearing [x]     Cognition  [x]     Perception [x]       MOBILITY: I Mod I S SBA CGA Min Mod Max Total  NT x2 Comments:   Bed Mobility    Rolling [] [] [] [] [] [x] [] [] [] [] [x]    Supine to Sit [] [] [] [] [] [x] [] [] [] [] [x]    Scooting [] [] [] [] [] [x] [] [] [] [] [x]    Sit to Supine [] [] [] [] [] [] [] [] [] [] []    Transfers    Sit to Stand [] [] [] [] [] [x] [] [] [] [] [x]    Bed to Chair [] [] [] [] [] [x] [] [] [] [] [x] HHA   Stand to Sit [] [] [] [] [] [x] [] [] [] [] [x]    Tub/Shower [] [] [] [] [] [] [] [] [] [] []     Toilet [] [] [] [] [] [] [] [] [] [] []      [] [] [] [] [] [] [] [] [] [] []    I=Independent, Mod I=Modified Independent, S=Supervision/Setup, SBA=Standby Assistance, CGA=Contact Guard Assistance, Min=Minimal Assistance, Mod=Moderate Assistance, Max=Maximal Assistance, Total=Total Assistance, NT=Not Tested    ACTIVITIES OF DAILY LIVING: I Mod I S SBA CGA Min Mod Max Total NT Comments   BASIC ADLs:              Upper Body Bathing  [] [] [] [] [] [] [] [] [] [x]    Lower Body Bathing [] [] [] [] [] [] [] [] [] [x]    Toileting [] [] [] [] [] [] [] [] [] [x]    Upper Body Dressing [] [] [] [] [] [x] [] [] [] [] Donning robe   Lower Body Dressing [] [] [] [] [] [] [] [x] [] [] Donning socks   Feeding [] [] [] [] [] [] [] [] [] [x]    Grooming [] [] [] [x] [] [] [] [] [] [] Grooming in unsupported sitting   Personal Device Care [] [] [] [] [] [] [] [] [] []    Functional Mobility [] [] [] [] [] [x] [] [] [] [] X 2   I=Independent, Mod I=Modified Independent, S=Supervision/Setup, SBA=Standby Assistance, CGA=Contact Guard Assistance, Min=Minimal Assistance, Mod=Moderate Assistance, Max=Maximal Assistance, Total=Total Assistance, NT=Not Tested    PLAN:     [facility] [place] of Care: 3 times/week for duration of hospital stay or until stated goals are met, whichever comes first.    PROBLEM LIST:   (Skilled intervention is medically necessary to address:)  Decreased ADL/Functional Activities  Decreased Activity Tolerance  Decreased AROM/PROM  Decreased Balance  Decreased Coordination  Decreased Gait Ability  Decreased Safety Awareness  Decreased Strength  Decreased Transfer Abilities  Increased Pain   INTERVENTIONS PLANNED:  (Benefits and precautions of occupational therapy have been discussed with the patient.)  Self Care Training  Therapeutic Activity  Therapeutic Exercise/HEP  Neuromuscular Re-education  Manual Therapy  Education         TREATMENT:     EVALUATION: LOW COMPLEXITY: (Untimed Charge)    TREATMENT:   Co-Treatment PT/OT necessary due to patient's decreased overall endurance/tolerance levels, as well as need for high level skilled assistance to complete functional transfers/mobility and functional tasks  Neuromuscular Re-education (13 Minutes): Neuromuscular Re-education included Balance Training, Coordination training, Postural training, Sitting balance training, Standing balance training, and energy conservation to improve Balance, Coordination, Functional Mobility, Postural Control, and activity tolerance. Self Care (10 minutes): Patient participated in upper body dressing, lower body dressing, and grooming ADLs in unsupported sitting with minimal verbal cueing to increase independence, decrease assistance required, and increase activity tolerance. Patient also participated in functional mobility, functional transfer, and energy conservation training to increase independence, decrease assistance required, and increase activity tolerance.      TREATMENT GRID:  N/A    AFTER TREATMENT PRECAUTIONS: Call light within reach, Chair, Needs within reach, and RN notified    INTERDISCIPLINARY COLLABORATION:  RN/ PCT, PT/ PTA, and OT/ RAI    EDUCATION:  Education Given To: Patient  Education Provided: Role of Therapy;Plan of Care  Barriers to Learning: None  Education Outcome: Verbalized understanding;Demonstrated understanding      TOTAL TREATMENT DURATION AND TIME:  Time In: 0425  Time Out: [address]  Minutes: [facility], OT

## 2022-12-05 NOTE — INTERDISCIPLINARY ROUNDS
Multi-D Rounds/Checklist (leapfrog):  Lines: can any be removed?: None     External Urinary Catheter (Active)      DVT Prophylaxis: Ordered  Vent: N/A  Nutrition Ordered/appropriate: Ordered  Can antibiotics or other drugs be stopped? Is Nozin performed: Yes/End Date set  Consults needed: None  A: Is pain control adequate? (has PRNs? Stop drip?) Yes  B: Sedation break and SBT? N/A  C: Is sedation choice appropriate? N/A  D: Delirium/CAM-ICU? No  E: Mobility goals/appropriateness? Yes  F: Family update and plan? daughter is primary contact and is being updated daily by primary attending and nursing staff.     Kang Liu NP, APRN - CNP

## 2022-12-05 NOTE — CARE COORDINATION
Pt chart reviewed for discharge planning. Pt admitted through ED to ICU for care and treatment for acute respiratory failure. Pt is knonw to CM from previous care at [facility] but was last discharged from [facility] with family and a referral to [facility]. Pt is still followed in home by [facility] RN and she also has home O2 in place. CM will follow pt plan of care and assist with supportive care referrals pending pt clinical progress. Please consult case management if specific needs arise. 12/05/22 5067   Service Assessment   Patient Orientation Alert and Oriented   Cognition Alert   History Provided By Patient   Primary Caregiver Spouse   Support Systems Spouse/Significant Other   Patient's Healthcare Decision Maker is: Legal Next of Kin   PCP Verified by CM Yes   Last Visit to PCP Within last 3 months   Prior Functional Level Assistance with the following:;Mobility   Current Functional Level Assistance with the following:;Mobility   Can patient return to prior living arrangement Yes   Ability to make needs known: Good   Family able to assist with home care needs: Yes   Would you like for me to discuss the discharge plan with any other family members/significant others, and if so, who? Yes   Financial Resources Medicare   Social/Functional History   Lives With Spouse   Type of [address] One level   [address] to enter with rails   Entrance Stairs - Number of Steps 2   [address] [facility] [address] Yes   Discharge Planning   Type of Kris Spouse/Significant Other   Current Services Prior To Admission [address]   Potential DME Needed   (await any recommendations)   Type of [address]    Patient expects to be discharged to: House   One/Two Story Residence One story   History of falls?  1   Services At/After Discharge   Transition of Care Consult (CM Consult) Discharge Planning;Home Health   Internal Home Health No   Reason Outside Agency Chosen Patient already serviced by other home care/hospice agency  ([address] RN)   [address] Provided? No   Mode of Transport at Discharge Other (see comment)  (family)   Confirm Follow Up Transport Family   Condition of Participation: Discharge Planning   The Plan for Transition of Care is related to the following treatment goals: Pt will need resumed [facility] service to return to her functional baseline. The Patient and/or Patient Representative was provided with a Choice of Provider? Patient   The Patient and/Or Patient Representative agree with the Discharge Plan? Yes   Freedom of Choice list was provided with basic dialogue that supports the patient's individualized plan of care/goals, treatment preferences, and shares the quality data associated with the providers?   Yes

## 2022-12-06 LAB
ABO + RH BLD: NORMAL
ANION GAP SERPL CALC-SCNC: 2 MMOL/L (ref 2–11)
ARTERIAL PATENCY WRIST A: POSITIVE
BASE EXCESS BLD CALC-SCNC: 3 MMOL/L
BDY SITE: ABNORMAL
BLOOD GROUP ANTIBODIES SERPL: NORMAL
BODY TEMPERATURE: 97.6
BUN SERPL-MCNC: 27 MG/DL (ref 8–23)
CALCIUM SERPL-MCNC: 9.8 MG/DL (ref 8.3–10.4)
CHLORIDE SERPL-SCNC: 100 MMOL/L (ref 101–110)
CO2 SERPL-SCNC: 31 MMOL/L (ref 21–32)
CREAT SERPL-MCNC: 0.7 MG/DL (ref 0.6–1)
ERYTHROCYTE [DISTWIDTH] IN BLOOD BY AUTOMATED COUNT: 17.9 % (ref 11.9–14.6)
ERYTHROCYTE [DISTWIDTH] IN BLOOD BY AUTOMATED COUNT: 18.1 % (ref 11.9–14.6)
GAS FLOW.O2 O2 DELIVERY SYS: ABNORMAL L/MIN
GLUCOSE SERPL-MCNC: 129 MG/DL (ref 65–100)
HCO3 BLD-SCNC: 27.9 MMOL/L (ref 22–26)
HCT VFR BLD AUTO: 26.6 % (ref 35.8–46.3)
HCT VFR BLD AUTO: 27.7 % (ref 35.8–46.3)
HGB BLD-MCNC: 8 G/DL (ref 11.7–15.4)
HGB BLD-MCNC: 8.4 G/DL (ref 11.7–15.4)
MCH RBC QN AUTO: 28.1 PG (ref 26.1–32.9)
MCH RBC QN AUTO: 28.4 PG (ref 26.1–32.9)
MCHC RBC AUTO-ENTMCNC: 30.1 G/DL (ref 31.4–35)
MCHC RBC AUTO-ENTMCNC: 30.3 G/DL (ref 31.4–35)
MCV RBC AUTO: 93.3 FL (ref 82–102)
MCV RBC AUTO: 93.6 FL (ref 82–102)
NRBC # BLD: 0 K/UL (ref 0–0.2)
NRBC # BLD: 0 K/UL (ref 0–0.2)
O2/TOTAL GAS SETTING VFR VENT: 100 %
PCO2 BLD: 42 MMHG (ref 35–45)
PH BLD: 7.43 [PH] (ref 7.35–7.45)
PLATELET # BLD AUTO: 264 K/UL (ref 150–450)
PLATELET # BLD AUTO: 292 K/UL (ref 150–450)
PMV BLD AUTO: 9.5 FL (ref 9.4–12.3)
PMV BLD AUTO: 9.6 FL (ref 9.4–12.3)
PO2 BLD: 60 MMHG (ref 75–100)
POTASSIUM SERPL-SCNC: 3.6 MMOL/L (ref 3.5–5.1)
RBC # BLD AUTO: 2.85 M/UL (ref 4.05–5.2)
RBC # BLD AUTO: 2.96 M/UL (ref 4.05–5.2)
SAO2 % BLD: 91.9 % (ref 95–98)
SERVICE CMNT-IMP: ABNORMAL
SODIUM SERPL-SCNC: 133 MMOL/L (ref 133–143)
SPECIMEN EXP DATE BLD: NORMAL
SPECIMEN TYPE: ABNORMAL
WBC # BLD AUTO: 15 K/UL (ref 4.3–11.1)
WBC # BLD AUTO: 16.6 K/UL (ref 4.3–11.1)

## 2022-12-06 PROCEDURE — 94660 CPAP INITIATION&MGMT: CPT

## 2022-12-06 PROCEDURE — 2580000003 HC RX 258: Performed by: INTERNAL MEDICINE

## 2022-12-06 PROCEDURE — 82803 BLOOD GASES ANY COMBINATION: CPT

## 2022-12-06 PROCEDURE — 2500000003 HC RX 250 WO HCPCS

## 2022-12-06 PROCEDURE — 85027 COMPLETE CBC AUTOMATED: CPT

## 2022-12-06 PROCEDURE — 6370000000 HC RX 637 (ALT 250 FOR IP)

## 2022-12-06 PROCEDURE — 80048 BASIC METABOLIC PNL TOTAL CA: CPT

## 2022-12-06 PROCEDURE — 2700000000 HC OXYGEN THERAPY PER DAY

## 2022-12-06 PROCEDURE — 99291 CRITICAL CARE FIRST HOUR: CPT | Performed by: INTERNAL MEDICINE

## 2022-12-06 PROCEDURE — 2000000000 HC ICU R&B

## 2022-12-06 PROCEDURE — 36600 WITHDRAWAL OF ARTERIAL BLOOD: CPT

## 2022-12-06 PROCEDURE — 6370000000 HC RX 637 (ALT 250 FOR IP): Performed by: FAMILY MEDICINE

## 2022-12-06 PROCEDURE — 99232 SBSQ HOSP IP/OBS MODERATE 35: CPT | Performed by: INTERNAL MEDICINE

## 2022-12-06 PROCEDURE — 6360000002 HC RX W HCPCS: Performed by: INTERNAL MEDICINE

## 2022-12-06 PROCEDURE — 94640 AIRWAY INHALATION TREATMENT: CPT

## 2022-12-06 PROCEDURE — 86850 RBC ANTIBODY SCREEN: CPT

## 2022-12-06 PROCEDURE — 36415 COLL VENOUS BLD VENIPUNCTURE: CPT

## 2022-12-06 PROCEDURE — 2500000003 HC RX 250 WO HCPCS: Performed by: INTERNAL MEDICINE

## 2022-12-06 PROCEDURE — 2580000003 HC RX 258: Performed by: EMERGENCY MEDICINE

## 2022-12-06 PROCEDURE — 2580000003 HC RX 258: Performed by: FAMILY MEDICINE

## 2022-12-06 RX ORDER — LIDOCAINE HYDROCHLORIDE 40 MG/ML
5 INJECTION, SOLUTION RETROBULBAR; TOPICAL EVERY 6 HOURS PRN
Status: DISCONTINUED | OUTPATIENT
Start: 2022-12-06 | End: 2022-12-09 | Stop reason: HOSPADM

## 2022-12-06 RX ORDER — BENZONATATE 100 MG/1
100 CAPSULE ORAL 3 TIMES DAILY PRN
Status: DISCONTINUED | OUTPATIENT
Start: 2022-12-06 | End: 2022-12-09 | Stop reason: HOSPADM

## 2022-12-06 RX ORDER — FUROSEMIDE 10 MG/ML
40 INJECTION INTRAMUSCULAR; INTRAVENOUS 2 TIMES DAILY
Status: DISCONTINUED | OUTPATIENT
Start: 2022-12-06 | End: 2022-12-08

## 2022-12-06 RX ADMIN — HYDROCODONE BITARTRATE AND ACETAMINOPHEN 1 TABLET: 7.5; 325 TABLET ORAL at 19:51

## 2022-12-06 RX ADMIN — DOXYCYCLINE 100 MG: 100 INJECTION, POWDER, LYOPHILIZED, FOR SOLUTION INTRAVENOUS at 10:50

## 2022-12-06 RX ADMIN — FLECAINIDE ACETATE 75 MG: 50 TABLET ORAL at 09:18

## 2022-12-06 RX ADMIN — METHYLPREDNISOLONE SODIUM SUCCINATE 60 MG: 125 INJECTION, POWDER, FOR SOLUTION INTRAMUSCULAR; INTRAVENOUS at 09:16

## 2022-12-06 RX ADMIN — HYDROXYZINE HYDROCHLORIDE 25 MG: 10 TABLET ORAL at 00:29

## 2022-12-06 RX ADMIN — LEVOTHYROXINE SODIUM 50 MCG: 0.05 TABLET ORAL at 04:47

## 2022-12-06 RX ADMIN — CEFEPIME 2000 MG: 2 INJECTION, POWDER, FOR SOLUTION INTRAVENOUS at 09:29

## 2022-12-06 RX ADMIN — METHYLPREDNISOLONE SODIUM SUCCINATE 60 MG: 125 INJECTION, POWDER, FOR SOLUTION INTRAMUSCULAR; INTRAVENOUS at 03:00

## 2022-12-06 RX ADMIN — MOMETASONE FUROATE AND FORMOTEROL FUMARATE DIHYDRATE 2 PUFF: 200; 5 AEROSOL RESPIRATORY (INHALATION) at 19:50

## 2022-12-06 RX ADMIN — SODIUM CHLORIDE, PRESERVATIVE FREE 5 ML: 5 INJECTION INTRAVENOUS at 21:39

## 2022-12-06 RX ADMIN — LORAZEPAM 1 MG: 1 TABLET ORAL at 02:55

## 2022-12-06 RX ADMIN — PANTOPRAZOLE SODIUM 40 MG: 40 TABLET, DELAYED RELEASE ORAL at 04:47

## 2022-12-06 RX ADMIN — FUROSEMIDE 40 MG: 10 INJECTION, SOLUTION INTRAMUSCULAR; INTRAVENOUS at 18:29

## 2022-12-06 RX ADMIN — FLUOXETINE 60 MG: 20 CAPSULE ORAL at 09:18

## 2022-12-06 RX ADMIN — MOMETASONE FUROATE AND FORMOTEROL FUMARATE DIHYDRATE 2 PUFF: 200; 5 AEROSOL RESPIRATORY (INHALATION) at 07:08

## 2022-12-06 RX ADMIN — SODIUM CHLORIDE, PRESERVATIVE FREE 10 ML: 5 INJECTION INTRAVENOUS at 19:47

## 2022-12-06 RX ADMIN — METHYLPREDNISOLONE SODIUM SUCCINATE 60 MG: 125 INJECTION, POWDER, FOR SOLUTION INTRAMUSCULAR; INTRAVENOUS at 21:32

## 2022-12-06 RX ADMIN — DICLOFENAC SODIUM 1 G: 10 GEL TOPICAL at 20:49

## 2022-12-06 RX ADMIN — TIMOLOL MALEATE 1 DROP: 5 SOLUTION OPHTHALMIC at 10:18

## 2022-12-06 RX ADMIN — LIDOCAINE HYDROCHLORIDE 5 ML: 40 INJECTION, SOLUTION RETROBULBAR; TOPICAL at 02:47

## 2022-12-06 RX ADMIN — DOXYCYCLINE 100 MG: 100 INJECTION, POWDER, LYOPHILIZED, FOR SOLUTION INTRAVENOUS at 21:45

## 2022-12-06 RX ADMIN — CEFEPIME 2000 MG: 2 INJECTION, POWDER, FOR SOLUTION INTRAVENOUS at 21:36

## 2022-12-06 RX ADMIN — BENZONATATE 100 MG: 100 CAPSULE ORAL at 00:28

## 2022-12-06 RX ADMIN — APIXABAN 5 MG: 5 TABLET, FILM COATED ORAL at 09:18

## 2022-12-06 RX ADMIN — METHYLPREDNISOLONE SODIUM SUCCINATE 60 MG: 125 INJECTION, POWDER, FOR SOLUTION INTRAMUSCULAR; INTRAVENOUS at 16:45

## 2022-12-06 RX ADMIN — SODIUM CHLORIDE, PRESERVATIVE FREE 5 ML: 5 INJECTION INTRAVENOUS at 16:45

## 2022-12-06 RX ADMIN — SENNOSIDES AND DOCUSATE SODIUM 2 TABLET: 8.6; 5 TABLET ORAL at 19:47

## 2022-12-06 RX ADMIN — METOPROLOL SUCCINATE 50 MG: 50 TABLET, EXTENDED RELEASE ORAL at 09:18

## 2022-12-06 RX ADMIN — FERROUS SULFATE TAB 325 MG (65 MG ELEMENTAL FE) 325 MG: 325 (65 FE) TAB at 09:18

## 2022-12-06 RX ADMIN — APIXABAN 5 MG: 5 TABLET, FILM COATED ORAL at 21:30

## 2022-12-06 RX ADMIN — FUROSEMIDE 40 MG: 10 INJECTION, SOLUTION INTRAMUSCULAR; INTRAVENOUS at 09:17

## 2022-12-06 RX ADMIN — SODIUM CHLORIDE, PRESERVATIVE FREE 10 ML: 5 INJECTION INTRAVENOUS at 09:19

## 2022-12-06 RX ADMIN — FLECAINIDE ACETATE 75 MG: 50 TABLET ORAL at 19:46

## 2022-12-06 RX ADMIN — AMITRIPTYLINE HYDROCHLORIDE 25 MG: 25 TABLET, FILM COATED ORAL at 19:47

## 2022-12-06 RX ADMIN — SODIUM CHLORIDE, PRESERVATIVE FREE 5 ML: 5 INJECTION INTRAVENOUS at 09:29

## 2022-12-06 ASSESSMENT — PAIN SCALES - GENERAL
PAINLEVEL_OUTOF10: 3
PAINLEVEL_OUTOF10: 0
PAINLEVEL_OUTOF10: 0
PAINLEVEL_OUTOF10: 5
PAINLEVEL_OUTOF10: 0

## 2022-12-06 ASSESSMENT — PAIN - FUNCTIONAL ASSESSMENT: PAIN_FUNCTIONAL_ASSESSMENT: ACTIVITIES ARE NOT PREVENTED

## 2022-12-06 ASSESSMENT — PAIN DESCRIPTION - LOCATION: LOCATION: BACK;HIP;LEG

## 2022-12-06 ASSESSMENT — PAIN DESCRIPTION - DESCRIPTORS: DESCRIPTORS: ACHING

## 2022-12-06 ASSESSMENT — PAIN DESCRIPTION - ORIENTATION: ORIENTATION: RIGHT;LEFT;POSTERIOR

## 2022-12-06 ASSESSMENT — PAIN DESCRIPTION - PAIN TYPE: TYPE: CHRONIC PAIN

## 2022-12-06 ASSESSMENT — PAIN DESCRIPTION - FREQUENCY: FREQUENCY: CONTINUOUS

## 2022-12-06 ASSESSMENT — PAIN DESCRIPTION - ONSET: ONSET: GRADUAL

## 2022-12-06 NOTE — PROGRESS NOTES
SPEECH PATHOLOGY NOTE    Patient on bipap at time of SLP attempt. Will follow up for diet tolerance at later time/date.      QUINTON Valenzuela, CCC-SLP  Speech Language Pathologist  Acute Rehabilitation Services  Contact: Tam

## 2022-12-06 NOTE — PROGRESS NOTES
[facility] Critical Care Note[de-identified] 12/6/2022  Marcelino Garcia  Admission Date: 12/3/2022     Length of Stay: 3 days    Background: 82 y.o. female with a history of SSS s/p PCM, PAF on eliquis, recent CAP, obesity, and DVT. Presented to ER 12/3 with increased dyspnea, edema, orthopnea. Sat 53% on arrival. Normal WBC, BNP 2517, CXR with interstitial infiltrates. Admitted to ICU and diuresed. Placed on CPAP. TTE was normal. RVP was normal.       Notable PMH:  has a past medical history of Chest pain, Dyspnea, GERD (gastroesophageal reflux disease), Headache, HTN (hypertension), benign, Malaise and fatigue, Paroxysmal atrial fibrillation ([address] Preoperative cardiovascular examination, Psychiatric disorder, PUD (peptic ulcer disease), Thromboembolus ([address] and Thyroid disease. 24 Hour events: Pt states she is feeling more short of breath today. Was on CPAP 100% overnight and this morning has marginal sats on 100% airvo. States she has been having a dry cough through the night. Review of Systems: Comprehensive ROS negative except in HPI    Lines: (insertion date)    External Urinary Catheter (Active)      Drips: current dose (range)        Pertinent Exam:         Blood pressure (!) 145/109, pulse 66, temperature 97.6 °F (36.4 °C), temperature source Axillary, resp. rate 28, height 5' 6\" (1.676 m), weight 195 lb 12.3 oz (88.8 kg), SpO2 95 %. Intake/Output Summary (Last 24 hours) at 12/6/2022 0728  Last data filed at 12/6/2022 0607  Gross per 24 hour   Intake 250 ml   Output 1300 ml   Net -1050 ml     Constitutional:  NAD, resting in bed  EENMT:  Sclera clear, pupils equal, oral mucosa moist  Respiratory: CTA B in anterior lung fields. Cardiovascular:  RRR, no m/r/g  Gastrointestinal:  soft with no tenderness; positive bowel sounds present  Musculoskeletal:  warm with no cyanosis, no lower extremity edema  Skin:  no jaundice or ecchymosis  Neurologic: alert and oriented.  Equal strength and sensation in all extremities. Psychiatric: calm. CXR: 12/5/22 12/5/22      Recent Labs     12/03/22  1506 12/04/22  0346 12/05/22  0312 12/06/22  0428   WBC 9.7 9.0 10.1 15.0*   HGB 8.1* 7.8* 7.3* 8.0*   HCT 26.6* 26.2* 24.6* 26.6*    256 249 264   PROCAL 0.11  --   --   --      Recent Labs     12/03/22  1506 12/04/22  0346 12/04/22  1343 12/05/22  0312 12/06/22  0428    139  --  136 133   K 3.2* 3.0* 3.5 3.5 3.6    99*  --  102 100*   CO2 31 35*  --  29 31   GLUCOSE 128* 102*  --  97 129*   BUN 20 17  --  22 27*   CREATININE 0.80 0.90  --  0.80 0.70   MG 2.0 1.6* 2.2 1.8  --    BILITOT 1.0 1.2*  --   --   --    AST 40* 41*  --   --   --    ALT 29 26  --   --   --    ALKPHOS 128 133  --   --   --      Recent Labs     12/03/22  1506 12/03/22  1716 12/04/22  0346   TROPHS 14.6* 16.3*  --    NTPROBNP 2,517*  --  2,755*     Recent Labs     12/04/22  0346 12/05/22  0312 12/06/22  0428   GLUCOSE 102* 97 129*      ECHO: 12/03/22    TRANSTHORACIC ECHOCARDIOGRAM (TTE) COMPLETE (CONTRAST/BUBBLE/3D PRN) 12/04/2022  1:05 PM (Final)    Interpretation Summary    Left Ventricle: Normal left ventricular systolic function with a visually estimated EF of 55 - 60%. Left ventricle size is normal. Normal wall thickness. Normal wall motion. Normal diastolic function. Technical qualifiers: Color flow Doppler was performed and pulse wave and/or continuous wave Doppler was performed. Signed by: Josie Castellanos MD on 12/4/2022  1:05 PM    Microbiology:   Recent Labs     12/05/22  0443   CULTURE SA target not detected. A MRSA NEGATIVE, SA NEGATIVE test result does not preclude MRSA or SA nasal colonization.      Ventilator Settings Ideal body weight: 59.3 kg (130 lb 11.7 oz)  Adjusted ideal body weight: 71.1 kg (156 lb 12 oz)  Mode FIO2 Rate Tidal Volume Pressure        100 %                  Peak airway pressure:     Minute ventilation:    ABG:  Recent Labs 12/03/22  1513 12/06/22  0300   PHAPOC 7.46* 7.43   NFP2FPEO 39.5 42.0   NW3RPCT 126* 60*   IKV8PPR 27.8* 27.9*   BE 3.7 3.0     Assessment and Plan:  (Medical Decision Making)   Impression: 82 y.o. female with with history of SSS s/p pacer, a fib. Presenting with acute hypoxic respiratory failure. Initially felt to be due to volume overload but now appearing euvolemic and CXR not changes, ongoing high O2 needs, and now with fever. Not producing sputum for culture. RVP negative. Blood culture pending. NEURO:   Sedation: none  Analgesia: none  CV:   Volume Status: appears euvolemic. Getting lasix 40 IV bid. Creatinine stable. Continue for now but does not look particularly volume overloaded. A fib: currently in NSR. Continue eliquis, metoprolol, and flecainide  PULM:   Acute hypoxemic respiratory failure:  patient up to 100% airvo. Marginal sats. Place on NRB/CPAP as needed. Discussed possible need for intubation with her. She is ok with this if needed. CT scan with diffuse GGO. Started IV steroids 12/5 to cover possible inflammatory processes. Added cefepime to doxy. Continue for now. RENAL:  Electrolytes: replace as needed  GI:   Nutrition: PO  HEME:   Anemia: hgb up to 8 today. On eliquis for a fib. Continue for now and monitor. Anticoagulation: eliquis  ID:   PNA: based on CXR with infiltrates and fever. WBC normal. RVP negative. Pt not producing sputum for culture. ER visit 11/17 with similar symptoms and CXR appearance. Cont cefepime and doxy. ENDO:   Hypothyroid: cont synthroid  Skin: no decub, turns, preventive care  Prophy: protonix and eliquis.      Full Code    The patient is critically ill with respiratory failure, circulatory failure and requires high complexity decision making for assessment and support including frequent ventilator adjustment, frequent evaluation and titration of therapies , application of advanced monitoring technologies and extensive interpretation of multiple databases    Cumulative time devoted to patient care services by me for day of service is 34 mins.     Willie Louis MD Statement Selected

## 2022-12-06 NOTE — INTERDISCIPLINARY ROUNDS
Multi-D Rounds/Checklist (leapfrog):  Lines: can any be removed?: None     External Urinary Catheter (Active)      DVT Prophylaxis: Ordered  Vent: N/A  Nutrition Ordered/appropriate: Ordered  Can antibiotics or other drugs be stopped? Is Nozin performed: Yes/End Date set  Consults needed: None  A: Is pain control adequate? (has PRNs? Stop drip?) Yes  B: Sedation break and SBT? N/A  C: Is sedation choice appropriate? N/A  D: Delirium/CAM-ICU? No  E: Mobility goals/appropriateness? Yes  F: Family update and plan? daughter is primary contact and is being updated daily by primary attending and nursing staff.     Mariella Rodriges NP, APRN - CNP

## 2022-12-06 NOTE — PROGRESS NOTES
12/05/22 2222   NIV Type   Skin Assessment Clean, dry, & intact   NIV Started/Stopped On   Equipment Type V60   Mode CPAP   Mask Type Full face mask   Mask Size Medium   Settings/Measurements   PIP Observed 11 cm H20   CPAP/EPAP 10 cmH2O   Vt (Measured) 413 mL   Resp 24   FiO2  90 %   Minute Volume (L/min) 9.9 Liters   Mask Leak (lpm) 0 lpm   Comfort Level Good   Using Accessory Muscles No   SpO2 90   Patient's Home Machine No   Alarm Settings   Alarms On Y   Low Pressure (cmH2O) 5 cmH2O   High Pressure (cmH2O) 35 cmH2O   Apnea (secs) 20 secs   RR Low (bpm) 8   RR High (bpm) 50 br/min

## 2022-12-06 NOTE — PROGRESS NOTES
This is a follow-up visit to the patient, providing a spiritual presence, emotional support and prayer. The patient appears to be resting.  observed the patient's  and daughter visiting later in the morning.     Leonidas Avila, [address] [facility], [facility]

## 2022-12-06 NOTE — PROGRESS NOTES
Physical Therapy Note:    Attempted to see patient this AM for physical therapy treatment  session. Patient on CPAP this AM so treatment held. Will follow and re-attempt as schedule permits/patient available. Thank you,    Tamra Medina.  Rachel Montoya     Rehab Caseload Tracker

## 2022-12-06 NOTE — PROGRESS NOTES
Patient called and says she will be having an endoscopy procedure done soon, she was told to plan her diabetes schedule around the procedure date.  She says Dr Maricarmen Oliver usually sends her to the diabetes clinic but she would like to know for sure before she s [facility] CARDIOLOGY PROGRESS NOTE           12/6/2022 11:43 AM    Admit Date: 12/3/2022      Subjective:   Patient remains on noninvasive ventilation. She continues to cough. ROS:  Cardiovascular:  As noted above    Objective:      Vitals:    12/06/22 0531 12/06/22 0546 12/06/22 0601 12/06/22 0616   BP:   (!) 145/109    Pulse: 61 62 60 66   Resp: (!) 31 (!) 31 28 28   Temp:       TempSrc:       SpO2: 97% 98% 95% 95%   Weight:       Height:           Physical Exam:  General-No Acute Distress  Neck- supple, no JVD  CV- regular rate and rhythm no MRG  Lung-managed bilaterally worse right than left  Abd- soft, nontender, nondistended  Ext- no edema bilaterally. Skin- warm and dry    Data Review:   Recent Labs     12/04/22  1343 12/05/22  0312 12/06/22  0428   NA  --  136 133   K 3.5 3.5 3.6   MG 2.2 1.8  --    BUN  --  22 27*   WBC  --  10.1 15.0*   HGB  --  7.3* 8.0*   HCT  --  24.6* 26.6*   PLT  --  249 264         Assessment/Plan:     Principal Problem:    Acute respiratory failure (HCC)  Plan: Complex diffuse pattern groundglass opacities and infiltrates. Patient is being treated with antibiotics and steroids. Renal function remained stable will add back IV furosemide today. Active Problems:    Pacemaker  Plan: Normal function    Anemia, unspecified  Plan: Severe and monitor closely. Hypokalemia  Plan: Replete per protocol. Pulmonary infiltrates  Plan: Suspect this is pneumonia/infection/chemical pneumonitis from aspiration. Acute congestive heart failure ([address]  Plan: Do not suspect this is related to volume overload. Anticoagulant long-term use  Plan: Chronically anticoagulated with Eliquis    Paroxysmal atrial fibrillation ([address]  Plan: Patient appears to have some component of some mode switching. Continue anticoagulation.     HTN (hypertension), benign  Plan: Hemodynamics are stable    Long term (current) use of anticoagulants  Plan: See above    Bradycardia  Plan: Normal pacemaker function.         Carlos Key MD  12/6/2022 11:43 AM

## 2022-12-06 NOTE — PROGRESS NOTES
Hospitalist Progress Note   Admit Date:  12/3/2022  2:56 PM   Name:  Breonna Maurer   Age:  82 y.o. Sex:  female  :  1935   MRN:  [id number]   Room:  310/    Reason(s) for Admission: Acute respiratory failure ([facility] [J96.00]  Acute congestive heart failure, unspecified heart failure type [facility]) [I50.9]     Hospital Course & Interval History:   Mrs. Rosalba Agudelo is an 83 y/o female with a h/o atrial fibrillation, hypothyroidism, bradycardia with PPM, fibromyalgia and GERD who was admitted to our service on 12/3 with acute hypoxemic respiratory failure felt due to atypical pneumonia vs pulmonary edema. She had a normal TTE In 2022. She was started on CPAP, IV Lasix and a nitroglycerin drip and admitted to ICU. Cardiology and Pulmonology consulted. RVP negative. Doxycycline added on . Weaned of nitro drip . She had minimal improvement with IV diuretics and CXR was unchanged so Lasix was discontinued on . CT chest  showed pan-lobar GGOs, cefepime and IV steroids were added. Subjective/24hr Events (22): Still SOB at rest, on Airvo max settings, O2 sat was just mid to high 80s but now into the 90s. NRB was added. She is agreeable to intubation if her condition worsens further. Otherwise she is awake and oriented. No chest pain, N/V/D. Assessment & Plan:   # Acute hypoxemic respiratory failure              - RA sats reportedly in the 46s on arrival. CXR showed atypical infiltrates vs pulmonary edema. She was started on IV Lasix and Cardiology consulted. Echo  is normal (as was TTE in Aug) and no significant urine output or improvement with Lasix so dc on . Repeat CXRs appear the same despite Lasix. - CT chest  showed \"pan-lobar\" GGOs, cefepime and IV steroids were added. Currently on Airvo with tenuous respiratory status, may require intubation. Pulm following. # Leukocytosis   - Secondary to IV steroids, on abx, afebrile, cultures neg.     # HypoK // hypoMg              - Resolved     # Chronic normocytic anemia // iron def anemia              - Hb 9.8 in October. Stable at 8 on 12/6. # GERD              - PPI     # Atrial fibrillation // bradycardia s/p PPM              - Eliquis, flecainide, metoprolol     # MDD // anxiety              - Prozac     # Hypothyroidism              - Synthroid    Discharge Planning: High risk for intubation, con't ICU level care. Diet:  ADULT DIET; Regular; Low Sodium (2 gm)  DVT PPx: Eliquis  Code status: Full Code    Hospital Problems             Last Modified POA    * (Principal) Acute respiratory failure ([facility] [address] 12/3/2022 Yes    Pacemaker 12/3/2022 Yes    Anemia, unspecified 12/3/2022 Yes    Hypokalemia 12/3/2022 Yes    Pulmonary infiltrates 12/4/2022 Yes    Acute congestive heart failure ([facility] [address] 12/5/2022 Yes    Anticoagulant long-term use 12/3/2022 Yes    Paroxysmal atrial fibrillation ([facility] [address] 12/3/2022 Yes    Overview Signed 3/19/2022 10:31 AM by Xavi, Conv[employer]     Rare, fleeting palp in evening, nothing sustained.  No bleeding prob           HTN (hypertension), benign 12/3/2022 Yes    Long term (current) use of anticoagulants 12/3/2022 Yes    Bradycardia 12/3/2022 Yes    Overview Signed 8/16/2022  2:25 PM by Benson Turcios MD     Added automatically from request for surgery [id number]            Objective:   Patient Vitals for the past 24 hrs:   Temp Pulse Resp BP SpO2   12/06/22 0616 -- 66 28 -- 95 %   12/06/22 0601 -- 60 28 (!) 145/109 95 %   12/06/22 0546 -- 62 (!) 31 -- 98 %   12/06/22 0531 -- 61 (!) 31 -- 97 %   12/06/22 0516 -- 60 (!) 31 -- 97 %   12/06/22 0501 -- 77 (!) 37 (!) 157/72 97 %   12/06/22 0446 -- 66 29 -- 94 %   12/06/22 0431 -- 60 30 -- 96 %   12/06/22 0416 -- 61 -- -- 99 %   12/06/22 0401 -- 61 25 138/65 92 %   12/06/22 0346 -- 66 22 -- 90 %   12/06/22 0331 -- 60 (!) 32 -- (!) 89 %   12/06/22 0316 -- 62 -- -- 91 %   12/06/22 0301 97.6 °F (36.4 °C) 60 25 138/64 91 %   12/06/22 0246 -- 60 22 -- 90 % 12/06/22 0231 -- 72 25 -- 90 %   12/06/22 0216 -- 73 28 -- 96 %   12/06/22 0201 -- 60 30 (!) 165/70 96 %   12/06/22 0146 -- 79 29 -- 96 %   12/06/22 0131 -- 60 30 -- 98 %   12/06/22 0116 -- 78 -- -- 97 %   12/06/22 0101 -- 79 (!) 31 (!) 167/78 96 %   12/06/22 0031 -- 94 -- -- 92 %   12/06/22 0016 -- 64 -- -- 93 %   12/06/22 0001 -- 71 (!) 39 (!) 152/71 93 %   12/05/22 2346 -- 60 25 -- 90 %   12/05/22 2331 -- 61 25 -- 91 %   12/05/22 2316 -- 61 22 -- 93 %   12/05/22 2315 98.2 °F (36.8 °C) 60 24 (!) 147/68 --   12/05/22 2301 98.2 °F (36.8 °C) 61 22 (!) 147/68 92 %   12/05/22 2246 -- 62 -- -- 94 %   12/05/22 2231 -- 67 27 -- 94 %   12/05/22 2222 -- -- 24 -- --   12/05/22 2216 -- 62 30 -- (!) 87 %   12/05/22 2201 -- 61 28 (!) 127/58 (!) 89 %   12/05/22 2146 -- 61 25 -- 91 %   12/05/22 2131 -- 60 25 -- 92 %   12/05/22 2116 -- 61 25 -- 94 %   12/05/22 2101 -- 63 27 (!) 129/58 95 %   12/05/22 2046 -- 61 27 -- 95 %   12/05/22 2031 -- 62 26 -- 94 %   12/05/22 2016 -- 63 (!) 31 -- 97 %   12/05/22 2001 -- 64 28 (!) 145/65 93 %   12/05/22 1946 -- 64 27 -- 93 %   12/05/22 1931 -- 71 26 -- 90 %   12/05/22 1920 -- 63 26 -- 91 %   12/05/22 1916 -- 62 29 -- 90 %   12/05/22 1901 98.3 °F (36.8 °C) 63 29 118/60 (!) 89 %   12/05/22 1800 -- 64 (!) 31 133/64 94 %   12/05/22 1700 -- 67 28 (!) 118/57 91 %   12/05/22 1600 -- 72 (!) 31 130/60 (!) 89 %   12/05/22 1500 99.4 °F (37.4 °C) 68 27 134/63 93 %   12/05/22 1441 -- 62 21 -- 92 %   12/05/22 1400 -- 64 (!) 38 125/61 91 %   12/05/22 1300 -- 64 (!) 33 129/60 (!) 88 %   12/05/22 1200 -- 66 (!) 34 127/60 93 %       Estimated body mass index is 31.6 kg/m² as calculated from the following:    Height as of this encounter: 5' 6\" (1.676 m). Weight as of this encounter: 195 lb 12.3 oz (88.8 kg).     Intake/Output Summary (Last 24 hours) at 12/6/2022 1127  Last data filed at 12/6/2022 6959  Gross per 24 hour   Intake 250 ml   Output 1000 ml   Net -750 ml         Physical Exam:   Blood pressure (!) 145/109, pulse 66, temperature 97.6 °F (36.4 °C), temperature source Axillary, resp. rate 28, height 5' 6\" (1.676 m), weight 195 lb 12.3 oz (88.8 kg), SpO2 95 %. General:    Well nourished. Awake, oriented x3, SOB at rest.   Head:  Normocephalic, atraumatic  Eyes:  Sclerae appear normal. Pupils equally round. ENT:  Nares appear normal, no drainage. Dry oral mucosa  Neck:  No restricted ROM. Trachea midline. CV:   RRR. No m/r/g. No jugular venous distension. Lungs:   Bilateral rales, no wheezes or rhonchi. Airvo 60L/100% bedside sats low 90s at rest. Conversational dyspnea. Abdomen: Bowel sounds present. Soft, nontender, nondistended. Extremities: No cyanosis or clubbing. No edema. Skin:     No rashes and normal coloration. Warm and dry. Neuro:  CN II-XII grossly intact. Sensation intact. A&Ox3  Psych:  Normal mood and affect.       I have reviewed ordered lab tests and independently visualized imaging below:    Recent Labs:  Recent Results (from the past 48 hour(s))   Transthoracic echocardiogram (TTE) complete with contrast, bubble, strain, and 3D PRN    Collection Time: 12/04/22 12:30 PM   Result Value Ref Range    LV EDV A2C 70 mL    LV EDV A4C 82 mL    LV ESV A2C 43 mL    LV ESV A4C 45 mL    IVSd 1.0 (A) 0.6 - 0.9 cm    LVIDd 5.2 3.9 - 5.3 cm    LVIDs 3.2 cm    LVOT Diameter 2.0 cm    LVOT Mean Gradient 3 mmHg    LVOT VTI 30.7 cm    LVOT Peak Velocity 1.4 m/s    LVOT Peak Gradient 8 mmHg    LVPWd 0.9 0.6 - 0.9 cm    LV E' Lateral Velocity 10 cm/s    LV E' Septal Velocity 7 cm/s    LV Ejection Fraction A2C 39 %    LV Ejection Fraction A4C 46 %    EF BP 43 (A) 55 - 100 %    LVOT Area 3.1 cm2    LVOT SV 96.4 ml    LA Minor Axis 5.6 cm    LA Major Axis 6.9 cm    LA Area 2C 26.3 cm2    LA Area 4C 18.0 cm2    LA Volume 2C 100 (A) 22 - 52 mL    LA Volume 4C 35 22 - 52 mL    LA Volume BP 65 (A) 22 - 52 mL    LA Diameter 2.6 cm    AV Mean Velocity 1.2 m/s    AV Mean Gradient 7 mmHg    AV VTI 38.1 cm AV Peak Velocity 1.8 m/s    AV Peak Gradient 12 mmHg    AV Area by VTI 2.5 cm2    AV Area by Peak Velocity 2.5 cm2    Aortic Root 3.2 cm    Ascending Aorta 3.0 cm    IVC Proxmal 1.9 cm    MV E Wave Deceleration Time 241.0 ms    MV A Velocity 0.94 m/s    MV E Velocity 0.86 m/s    PV Max Velocity 1.0 m/s    PV Peak Gradient 4 mmHg    Est. RA Pressure 3 mmHg    RV Basal Dimension 4.2 cm    RV Free Wall Peak S' 18 cm/s    TR Max Velocity 2.87 m/s    TR Peak Gradient 33 mmHg    Body Surface Area 2.01 m2    Fractional Shortening 2D 38 28 - 44 %    LV ESV Index A4C 23 mL/m2    LV EDV Index A4C 41 mL/m2    LV ESV Index A2C 22 mL/m2    LV EDV Index A2C 35 mL/m2    LVIDd Index 2.63 cm/m2    LVIDs Index 1.62 cm/m2    LV RWT Ratio 0.35     LV Mass 2D 181.4 (A) 67 - 162 g    LV Mass 2D Index 91.6 43 - 95 g/m2    MV E/A 0.91     E/E' Ratio (Averaged) 10.44     E/E' Lateral 8.60     E/E' Septal 12.29     LA Volume Index BP 33 16 - 34 ml/m2    LVOT Stroke Volume Index 48.7 mL/m2    LA Volume Index 2C 51 (A) 16 - 34 mL/m2    LA Volume Index 4C 18 16 - 34 mL/m2    LA Size Index 1.31 cm/m2    LA/AO Root Ratio 0.81     Ao Root Index 1.62 cm/m2    Ascending Aorta Index 1.52 cm/m2    AV Velocity Ratio 0.78     LVOT:AV VTI Index 0.81     MEE/BSA VTI 1.3 cm2/m2    MEE/BSA Peak Velocity 1.3 cm2/m2    RVSP 36 mmHg   Iron    Collection Time: 12/04/22  1:43 PM   Result Value Ref Range    Iron 93 35 - 150 ug/dL   Magnesium    Collection Time: 12/04/22  1:43 PM   Result Value Ref Range    Magnesium 2.2 1.8 - 2.4 mg/dL   Potassium    Collection Time: 12/04/22  1:43 PM   Result Value Ref Range    Potassium 3.5 3.5 - 5.1 mmol/L   Basic Metabolic Panel w/ Reflex to MG    Collection Time: 12/05/22  3:12 AM   Result Value Ref Range    Sodium 136 133 - 143 mmol/L    Potassium 3.5 3.5 - 5.1 mmol/L    Chloride 102 101 - 110 mmol/L    CO2 29 21 - 32 mmol/L    Anion Gap 5 2 - 11 mmol/L    Glucose 97 65 - 100 mg/dL    BUN 22 8 - 23 MG/DL    Creatinine 0.80 0.6 - 1.0 MG/DL    Est, Glom Filt Rate >60 >60 ml/min/1.73m2    Calcium 9.1 8.3 - 10.4 MG/DL   CBC    Collection Time: 12/05/22  3:12 AM   Result Value Ref Range    WBC 10.1 4.3 - 11.1 K/uL    RBC 2.63 (L) 4.05 - 5.2 M/uL    Hemoglobin 7.3 (L) 11.7 - 15.4 g/dL    Hematocrit 24.6 (L) 35.8 - 46.3 %    MCV 93.5 82 - 102 FL    MCH 27.8 26.1 - 32.9 PG    MCHC 29.7 (L) 31.4 - 35.0 g/dL    RDW 18.2 (H) 11.9 - 14.6 %    Platelets 657 487 - 521 K/uL    MPV 9.3 (L) 9.4 - 12.3 FL    nRBC 0.00 0.0 - 0.2 K/uL   Culture, Blood 1    Collection Time: 12/05/22  3:12 AM    Specimen: Blood   Result Value Ref Range    Special Requests LEFT  Antecubital        Culture NO GROWTH 1 DAY     Magnesium    Collection Time: 12/05/22  3:12 AM   Result Value Ref Range    Magnesium 1.8 1.8 - 2.4 mg/dL   MSSA/MRSA Screen BY PCR    Collection Time: 12/05/22  4:43 AM    Specimen: Nares; Swab   Result Value Ref Range    Special Requests NO SPECIAL REQUESTS      Culture        SA target not detected. A MRSA NEGATIVE, SA NEGATIVE test result does not preclude MRSA or SA nasal colonization.    Group A Strep Screen By PCR    Collection Time: 12/05/22  4:43 AM    Specimen: Swab   Result Value Ref Range    Strep, Molecular Not detected NOTD     Arterial Blood Gas, POC    Collection Time: 12/06/22  3:00 AM   Result Value Ref Range    DEVICE High Flow Nasal Cannula      FIO2 100 %    pH, Arterial, POC 7.43 7.35 - 7.45      pCO2, Arterial, POC 42.0 35 - 45 MMHG    pO2, Arterial, POC 60 (L) 75 - 100 MMHG    HCO3, Mixed 27.9 (H) 22 - 26 MMOL/L    SO2c, Arterial, POC 91.9 (L) 95 - 98 %    Base Excess 3.0 mmol/L    POC Thomas's Test Positive      Site LEFT RADIAL      Pt Temp 97.6      Specimen type: ARTERIAL      Performed by: [id number]    Basic Metabolic Panel w/ Reflex to MG    Collection Time: 12/06/22  4:28 AM   Result Value Ref Range    Sodium 133 133 - 143 mmol/L    Potassium 3.6 3.5 - 5.1 mmol/L    Chloride 100 (L) 101 - 110 mmol/L    CO2 31 21 - 32 mmol/L    Anion Gap 2 2 - 11 mmol/L    Glucose 129 (H) 65 - 100 mg/dL    BUN 27 (H) 8 - 23 MG/DL    Creatinine 0.70 0.6 - 1.0 MG/DL    Est, Glom Filt Rate >60 >60 ml/min/1.73m2    Calcium 9.8 8.3 - 10.4 MG/DL   CBC    Collection Time: 12/06/22  4:28 AM   Result Value Ref Range    WBC 15.0 (H) 4.3 - 11.1 K/uL    RBC 2.85 (L) 4.05 - 5.2 M/uL    Hemoglobin 8.0 (L) 11.7 - 15.4 g/dL    Hematocrit 26.6 (L) 35.8 - 46.3 %    MCV 93.3 82 - 102 FL    MCH 28.1 26.1 - 32.9 PG    MCHC 30.1 (L) 31.4 - 35.0 g/dL    RDW 17.9 (H) 11.9 - 14.6 %    Platelets 969 022 - 146 K/uL    MPV 9.6 9.4 - 12.3 FL    nRBC 0.00 0.0 - 0.2 K/uL   TYPE AND SCREEN    Collection Time: 12/06/22  4:28 AM   Result Value Ref Range    Crossmatch expiration date 12/09/2022,2359     ABO/Rh O POSITIVE     Antibody Screen NEG          Other Studies:  CT CHEST WO CONTRAST    Result Date: 12/5/2022  CT of the chest without contrast. CLINICAL INDICATION: Hypoxemia, respiratory failure, infiltrates PROCEDURE: Serial thin section axial images obtained from the thoracic inlet through the upper abdomen without the administration of intravenous contrast. Radiation dose reduction techniques were used for this study. Our CT scanners use one or all of the following: Automated exposure control, adjusted of the mA and/or kV according to patient size, iterative reconstruction COMPARISON: Chest x-ray dated 12/5/2022 FINDINGS: Shotty mediastinal lymph nodes are present. Small bilateral pleural effusions are present. There is a trace pericardial effusion. A pacer device is in place. Confluent groundglass opacity is noted throughout the lower lobes with more peripheral patchy nodular densities in the subpleural lungs. More geographic groundglass patchy opacities noted in the bilateral upper lobes. Atypical pneumonia favored. Limited evaluation of the upper abdomen is unremarkable. No aggressive bone lesions identified.      1. Pan lobar, primarily groundglass opacities with more peripheral reticular nodular interstitial density with greater involvement of the lower lobes. This pattern is nonspecific; however, atypical viral pneumonia is favored. XR CHEST PORTABLE    Result Date: 12/5/2022  EXAMINATION: One view chest HISTORY: F/U pulmonary edema TECHNIQUE: Frontal chest. COMPARISON: 12/4/2022 FINDINGS:  Stable left-sided cardiac device. Persistent bilateral lung infiltrates. No significant interval change. There is no significant pneumothorax. There is no pleural effusion. The heart is unchanged. No other significant interval changes. 1. Findings as described above. Transthoracic echocardiogram (TTE) complete with contrast, bubble, strain, and 3D PRN    Result Date: 12/4/2022    Left Ventricle: Normal left ventricular systolic function with a visually estimated EF of 55 - 60%. Left ventricle size is normal. Normal wall thickness. Normal wall motion. Normal diastolic function. Technical qualifiers: Color flow Doppler was performed and pulse wave and/or continuous wave Doppler was performed.        Current Meds:  Current Facility-Administered Medications   Medication Dose Route Frequency    benzonatate (TESSALON) capsule 100 mg  100 mg Oral TID PRN    lidocaine PF 4 % injection 5 mL  5 mL Inhalation Q6H PRN    furosemide (LASIX) injection 40 mg  40 mg IntraVENous BID    methylPREDNISolone sodium (SOLU-MEDROL) injection 60 mg  60 mg IntraVENous Q6H    cefepime (MAXIPIME) 2,000 mg in sodium chloride 0.9 % 50 mL IVPB mini-bag  2,000 mg IntraVENous Q12H    magnesium sulfate 2000 mg in 50 mL IVPB premix  2,000 mg IntraVENous PRN    magnesium sulfate 2000 mg in 50 mL IVPB premix  2,000 mg IntraVENous Once    doxycycline (VIBRAMYCIN) 100 mg in sodium chloride 0.9 % 100 mL IVPB  100 mg IntraVENous Q12H    sodium chloride flush 0.9 % injection 5 mL  5 mL IntraVENous Q8H    sodium chloride flush 0.9 % injection 10 mL  10 mL IntraVENous PRN amitriptyline (ELAVIL) tablet 25 mg  25 mg Oral Nightly    diclofenac sodium (VOLTAREN) 1 % gel 1 g  1 g Topical 4x Daily PRN    apixaban (ELIQUIS) tablet 5 mg  5 mg Oral BID    ferrous sulfate (IRON 325) tablet 325 mg  325 mg Oral Daily    flecainide (TAMBOCOR) tablet 75 mg  75 mg Oral BID    FLUoxetine (PROZAC) capsule 60 mg  60 mg Oral Daily    hydrOXYzine HCl (ATARAX) tablet 25 mg  25 mg Oral Nightly PRN    levothyroxine (SYNTHROID) tablet 50 mcg  50 mcg Oral QAM AC    metoprolol succinate (TOPROL XL) extended release tablet 50 mg  50 mg Oral Daily    pantoprazole (PROTONIX) tablet 40 mg  40 mg Oral QAM AC    timolol (TIMOPTIC) 0.5 % ophthalmic solution 1 drop  1 drop Both Eyes Daily    mometasone-formoterol (DULERA) 200-5 MCG/ACT inhaler 2 puff  2 puff Inhalation BID    LORazepam (ATIVAN) tablet 1 mg  1 mg Oral Q12H PRN    HYDROcodone-acetaminophen (NORCO) 7.5-325 MG per tablet 1 tablet  1 tablet Oral Q6H PRN    sennosides-docusate sodium (SENOKOT-S) 8.6-50 MG tablet 2 tablet  2 tablet Oral Nightly    albuterol (PROVENTIL) nebulizer solution 2.5 mg  2.5 mg Nebulization Q8H PRN    sodium chloride flush 0.9 % injection 5-40 mL  5-40 mL IntraVENous 2 times per day    sodium chloride flush 0.9 % injection 5-40 mL  5-40 mL IntraVENous PRN    0.9 % sodium chloride infusion   IntraVENous PRN    polyethylene glycol (GLYCOLAX) packet 17 g  17 g Oral Daily PRN    acetaminophen (TYLENOL) tablet 650 mg  650 mg Oral Q6H PRN    Or    acetaminophen (TYLENOL) suppository 650 mg  650 mg Rectal Q6H PRN       Signed:  Huy Edwards MD    Part of this note may have been written by using a voice dictation software. The note has been proof read but may still contain some grammatical/other typographical errors.

## 2022-12-07 ENCOUNTER — APPOINTMENT (OUTPATIENT)
Dept: GENERAL RADIOLOGY | Age: 87
End: 2022-12-07
Payer: MEDICARE

## 2022-12-07 LAB
ANION GAP SERPL CALC-SCNC: 7 MMOL/L (ref 2–11)
BASOPHILS # BLD: 0 K/UL (ref 0–0.2)
BASOPHILS NFR BLD: 0 % (ref 0–2)
BUN SERPL-MCNC: 44 MG/DL (ref 8–23)
CALCIUM SERPL-MCNC: 9.7 MG/DL (ref 8.3–10.4)
CHLORIDE SERPL-SCNC: 101 MMOL/L (ref 101–110)
CO2 SERPL-SCNC: 29 MMOL/L (ref 21–32)
CREAT SERPL-MCNC: 1 MG/DL (ref 0.6–1)
DIFFERENTIAL METHOD BLD: ABNORMAL
EOSINOPHIL # BLD: 0 K/UL (ref 0–0.8)
EOSINOPHIL NFR BLD: 0 % (ref 0.5–7.8)
ERYTHROCYTE [DISTWIDTH] IN BLOOD BY AUTOMATED COUNT: 18 % (ref 11.9–14.6)
GLUCOSE SERPL-MCNC: 137 MG/DL (ref 65–100)
HCT VFR BLD AUTO: 27.8 % (ref 35.8–46.3)
HGB BLD-MCNC: 8.2 G/DL (ref 11.7–15.4)
IMM GRANULOCYTES # BLD AUTO: 0.2 K/UL (ref 0–0.5)
IMM GRANULOCYTES NFR BLD AUTO: 1 % (ref 0–5)
LYMPHOCYTES # BLD: 0.8 K/UL (ref 0.5–4.6)
LYMPHOCYTES NFR BLD: 5 % (ref 13–44)
MAGNESIUM SERPL-MCNC: 1.8 MG/DL (ref 1.8–2.4)
MCH RBC QN AUTO: 28 PG (ref 26.1–32.9)
MCHC RBC AUTO-ENTMCNC: 29.5 G/DL (ref 31.4–35)
MCV RBC AUTO: 94.9 FL (ref 82–102)
MONOCYTES # BLD: 0.5 K/UL (ref 0.1–1.3)
MONOCYTES NFR BLD: 3 % (ref 4–12)
NEUTS SEG # BLD: 13.1 K/UL (ref 1.7–8.2)
NEUTS SEG NFR BLD: 90 % (ref 43–78)
NRBC # BLD: 0 K/UL (ref 0–0.2)
PLATELET # BLD AUTO: 234 K/UL (ref 150–450)
PMV BLD AUTO: 9.5 FL (ref 9.4–12.3)
POTASSIUM SERPL-SCNC: 3.1 MMOL/L (ref 3.5–5.1)
RBC # BLD AUTO: 2.93 M/UL (ref 4.05–5.2)
RHEUMATOID FACT SER QL LA: NEGATIVE
SODIUM SERPL-SCNC: 137 MMOL/L (ref 133–143)
WBC # BLD AUTO: 14.5 K/UL (ref 4.3–11.1)

## 2022-12-07 PROCEDURE — 2500000003 HC RX 250 WO HCPCS: Performed by: INTERNAL MEDICINE

## 2022-12-07 PROCEDURE — 6360000002 HC RX W HCPCS: Performed by: INTERNAL MEDICINE

## 2022-12-07 PROCEDURE — 36415 COLL VENOUS BLD VENIPUNCTURE: CPT

## 2022-12-07 PROCEDURE — 2580000003 HC RX 258: Performed by: FAMILY MEDICINE

## 2022-12-07 PROCEDURE — 97530 THERAPEUTIC ACTIVITIES: CPT

## 2022-12-07 PROCEDURE — 86430 RHEUMATOID FACTOR TEST QUAL: CPT

## 2022-12-07 PROCEDURE — 94660 CPAP INITIATION&MGMT: CPT

## 2022-12-07 PROCEDURE — 2700000000 HC OXYGEN THERAPY PER DAY

## 2022-12-07 PROCEDURE — 6370000000 HC RX 637 (ALT 250 FOR IP): Performed by: FAMILY MEDICINE

## 2022-12-07 PROCEDURE — 85025 COMPLETE CBC W/AUTO DIFF WBC: CPT

## 2022-12-07 PROCEDURE — 99233 SBSQ HOSP IP/OBS HIGH 50: CPT | Performed by: INTERNAL MEDICINE

## 2022-12-07 PROCEDURE — 86235 NUCLEAR ANTIGEN ANTIBODY: CPT

## 2022-12-07 PROCEDURE — 2580000003 HC RX 258: Performed by: INTERNAL MEDICINE

## 2022-12-07 PROCEDURE — 92526 ORAL FUNCTION THERAPY: CPT

## 2022-12-07 PROCEDURE — 2000000000 HC ICU R&B

## 2022-12-07 PROCEDURE — 2580000003 HC RX 258: Performed by: EMERGENCY MEDICINE

## 2022-12-07 PROCEDURE — 86037 ANCA TITER EACH ANTIBODY: CPT

## 2022-12-07 PROCEDURE — 71045 X-RAY EXAM CHEST 1 VIEW: CPT

## 2022-12-07 PROCEDURE — 6370000000 HC RX 637 (ALT 250 FOR IP)

## 2022-12-07 PROCEDURE — 83735 ASSAY OF MAGNESIUM: CPT

## 2022-12-07 PROCEDURE — 97535 SELF CARE MNGMENT TRAINING: CPT

## 2022-12-07 PROCEDURE — 94761 N-INVAS EAR/PLS OXIMETRY MLT: CPT

## 2022-12-07 PROCEDURE — 80048 BASIC METABOLIC PNL TOTAL CA: CPT

## 2022-12-07 PROCEDURE — 94640 AIRWAY INHALATION TREATMENT: CPT

## 2022-12-07 PROCEDURE — 86038 ANTINUCLEAR ANTIBODIES: CPT

## 2022-12-07 RX ORDER — POTASSIUM CHLORIDE 7.45 MG/ML
10 INJECTION INTRAVENOUS PRN
Status: DISCONTINUED | OUTPATIENT
Start: 2022-12-07 | End: 2022-12-09 | Stop reason: HOSPADM

## 2022-12-07 RX ORDER — METHYLPREDNISOLONE SODIUM SUCCINATE 125 MG/2ML
250 INJECTION, POWDER, LYOPHILIZED, FOR SOLUTION INTRAMUSCULAR; INTRAVENOUS EVERY 6 HOURS
Status: DISCONTINUED | OUTPATIENT
Start: 2022-12-07 | End: 2022-12-08

## 2022-12-07 RX ORDER — POTASSIUM CHLORIDE 20 MEQ/1
40 TABLET, EXTENDED RELEASE ORAL PRN
Status: DISCONTINUED | OUTPATIENT
Start: 2022-12-07 | End: 2022-12-09 | Stop reason: HOSPADM

## 2022-12-07 RX ADMIN — METHYLPREDNISOLONE SODIUM SUCCINATE 250 MG: 125 INJECTION, POWDER, FOR SOLUTION INTRAMUSCULAR; INTRAVENOUS at 17:00

## 2022-12-07 RX ADMIN — CEFEPIME 2000 MG: 2 INJECTION, POWDER, FOR SOLUTION INTRAVENOUS at 21:41

## 2022-12-07 RX ADMIN — SODIUM CHLORIDE, PRESERVATIVE FREE 10 ML: 5 INJECTION INTRAVENOUS at 21:43

## 2022-12-07 RX ADMIN — APIXABAN 5 MG: 5 TABLET, FILM COATED ORAL at 08:23

## 2022-12-07 RX ADMIN — METHYLPREDNISOLONE SODIUM SUCCINATE 250 MG: 125 INJECTION, POWDER, FOR SOLUTION INTRAMUSCULAR; INTRAVENOUS at 21:42

## 2022-12-07 RX ADMIN — METHYLPREDNISOLONE SODIUM SUCCINATE 250 MG: 125 INJECTION, POWDER, FOR SOLUTION INTRAMUSCULAR; INTRAVENOUS at 11:00

## 2022-12-07 RX ADMIN — FERROUS SULFATE TAB 325 MG (65 MG ELEMENTAL FE) 325 MG: 325 (65 FE) TAB at 08:22

## 2022-12-07 RX ADMIN — FLECAINIDE ACETATE 75 MG: 50 TABLET ORAL at 08:22

## 2022-12-07 RX ADMIN — SENNOSIDES AND DOCUSATE SODIUM 2 TABLET: 8.6; 5 TABLET ORAL at 20:19

## 2022-12-07 RX ADMIN — SODIUM CHLORIDE, PRESERVATIVE FREE 5 ML: 5 INJECTION INTRAVENOUS at 17:00

## 2022-12-07 RX ADMIN — FUROSEMIDE 40 MG: 10 INJECTION, SOLUTION INTRAMUSCULAR; INTRAVENOUS at 17:06

## 2022-12-07 RX ADMIN — HYDROXYZINE HYDROCHLORIDE 25 MG: 10 TABLET ORAL at 21:36

## 2022-12-07 RX ADMIN — APIXABAN 5 MG: 5 TABLET, FILM COATED ORAL at 20:30

## 2022-12-07 RX ADMIN — VANCOMYCIN HYDROCHLORIDE 2000 MG: 100 INJECTION, POWDER, LYOPHILIZED, FOR SOLUTION INTRAVENOUS at 09:51

## 2022-12-07 RX ADMIN — METHYLPREDNISOLONE SODIUM SUCCINATE 60 MG: 125 INJECTION, POWDER, FOR SOLUTION INTRAMUSCULAR; INTRAVENOUS at 05:53

## 2022-12-07 RX ADMIN — FLUOXETINE 60 MG: 20 CAPSULE ORAL at 08:22

## 2022-12-07 RX ADMIN — SODIUM CHLORIDE, PRESERVATIVE FREE 5 ML: 5 INJECTION INTRAVENOUS at 08:24

## 2022-12-07 RX ADMIN — SODIUM CHLORIDE, PRESERVATIVE FREE 5 ML: 5 INJECTION INTRAVENOUS at 21:43

## 2022-12-07 RX ADMIN — PANTOPRAZOLE SODIUM 40 MG: 40 TABLET, DELAYED RELEASE ORAL at 05:16

## 2022-12-07 RX ADMIN — HYDROCODONE BITARTRATE AND ACETAMINOPHEN 1 TABLET: 7.5; 325 TABLET ORAL at 17:06

## 2022-12-07 RX ADMIN — DOXYCYCLINE 100 MG: 100 INJECTION, POWDER, LYOPHILIZED, FOR SOLUTION INTRAVENOUS at 10:45

## 2022-12-07 RX ADMIN — CEFEPIME 2000 MG: 2 INJECTION, POWDER, FOR SOLUTION INTRAVENOUS at 10:52

## 2022-12-07 RX ADMIN — FLECAINIDE ACETATE 75 MG: 50 TABLET ORAL at 20:18

## 2022-12-07 RX ADMIN — DOXYCYCLINE 100 MG: 100 INJECTION, POWDER, LYOPHILIZED, FOR SOLUTION INTRAVENOUS at 21:45

## 2022-12-07 RX ADMIN — AMITRIPTYLINE HYDROCHLORIDE 25 MG: 25 TABLET, FILM COATED ORAL at 20:19

## 2022-12-07 RX ADMIN — MOMETASONE FUROATE AND FORMOTEROL FUMARATE DIHYDRATE 2 PUFF: 200; 5 AEROSOL RESPIRATORY (INHALATION) at 20:29

## 2022-12-07 RX ADMIN — TIMOLOL MALEATE 1 DROP: 5 SOLUTION OPHTHALMIC at 08:24

## 2022-12-07 RX ADMIN — HYDROCODONE BITARTRATE AND ACETAMINOPHEN 1 TABLET: 7.5; 325 TABLET ORAL at 11:00

## 2022-12-07 RX ADMIN — METOPROLOL SUCCINATE 50 MG: 50 TABLET, EXTENDED RELEASE ORAL at 08:22

## 2022-12-07 RX ADMIN — FUROSEMIDE 40 MG: 10 INJECTION, SOLUTION INTRAMUSCULAR; INTRAVENOUS at 08:23

## 2022-12-07 RX ADMIN — LEVOTHYROXINE SODIUM 50 MCG: 0.05 TABLET ORAL at 05:16

## 2022-12-07 RX ADMIN — POTASSIUM BICARBONATE 40 MEQ: 782 TABLET, EFFERVESCENT ORAL at 08:22

## 2022-12-07 RX ADMIN — SODIUM CHLORIDE, PRESERVATIVE FREE 10 ML: 5 INJECTION INTRAVENOUS at 08:25

## 2022-12-07 ASSESSMENT — PAIN DESCRIPTION - DESCRIPTORS
DESCRIPTORS: ACHING
DESCRIPTORS: ACHING

## 2022-12-07 ASSESSMENT — PAIN DESCRIPTION - LOCATION
LOCATION: GENERALIZED

## 2022-12-07 ASSESSMENT — PAIN SCALES - GENERAL
PAINLEVEL_OUTOF10: 0
PAINLEVEL_OUTOF10: 6
PAINLEVEL_OUTOF10: 0
PAINLEVEL_OUTOF10: 0
PAINLEVEL_OUTOF10: 6
PAINLEVEL_OUTOF10: 0
PAINLEVEL_OUTOF10: 0
PAINLEVEL_OUTOF10: 6
PAINLEVEL_OUTOF10: 0

## 2022-12-07 ASSESSMENT — PAIN - FUNCTIONAL ASSESSMENT
PAIN_FUNCTIONAL_ASSESSMENT: ACTIVITIES ARE NOT PREVENTED
PAIN_FUNCTIONAL_ASSESSMENT: ACTIVITIES ARE NOT PREVENTED

## 2022-12-07 NOTE — PROGRESS NOTES
participated in total body bathing tasks. Min A required for UB bathing (d/t back management) and LB bathing. New gown donned. Rest break provided before ambulating to bedside chair with min A x 2 x HHA. Seated rest break provided with emphasis on diaphragmatic breathing and energy conservation. Completed sit <> stand with min A x 2 to facilitate sushant-care as well as brief and purewick placement in static standing. Progressing towards therapy goals and plan of care. Will continue OT efforts as indicated. SUBJECTIVE:     Ms. Guanakito Rangel states, \"My  and I have been together for 59 years. \"     Social/Functional Lives With: Spouse  Type of Home: House  Home Layout: One level  Home Access: Stairs to enter with rails  Entrance Stairs - Number of Steps: 2  Home Equipment: Cane  ADL Assistance: Independent  Active : Yes    OBJECTIVE:     Shantel Lozano / Lola Murphy / Kae Altmanek: Harley Vila, and Airvo    RESTRICTIONS/PRECAUTIONS:  Restrictions/Precautions  Restrictions/Precautions: Fall Risk        PAIN: VITALS / O2:   Pre Treatment:   Numeric: 0/10             Post Treatment: 0 /10 Vitals          Oxygen     On Airvo 60L/100%           MOBILITY: I Mod I S SBA CGA Min Mod Max Total  NT x2 Comments:   Bed Mobility    Rolling [] [] [] [] [] [x] [] [] [] [] [x]    Supine to Sit [] [] [] [] [] [x] [] [] [] [] [x]    Scooting [] [] [] [] [] [x] [] [] [] [] [x]    Sit to Supine [] [] [] [] [] [] [] [] [] [] []    Transfers    Sit to Stand [] [] [] [] [] [x] [] [] [] [] [x]    Bed to Chair [] [] [] [] [] [x] [] [] [] [] [x]    Stand to Sit [] [] [] [] [] [x] [] [] [] [] [x]    I=Independent, Mod I=Modified Independent, S=Supervision/Setup, SBA=Standby Assistance, CGA=Contact Guard Assistance, Min=Minimal Assistance, Mod=Moderate Assistance, Max=Maximal Assistance, Total=Total Assistance, NT=Not Tested    ACTIVITIES OF DAILY LIVING: I Mod I S SBA CGA Min Mod Max Total NT Comments   BASIC ADLs:              Bathing/ Showering [] [] [] [] [] [x] [] [] [] [] Total body bathing EOB   Toileting [] [] [] [] [] [x] [] [] [] [] Nicole-care in static standing    Upper Body Dressing [] [] [] [x] [] [] [] [] [] [] Donning new gown   Lower Body Dressing [] [] [] [] [] [] [] [] [] [x]    Feeding [] [] [] [] [] [] [] [] [] [x]    Grooming [] [] [] [x] [] [] [] [] [] [] Grooming EOS   Personal Device Care [] [] [] [] [] [] [] [] [] [x]    Functional Mobility [] [] [] [] [] [x] [] [] [] [] X 2 x HHA   I=Independent, Mod I=Modified Independent, S=Supervision/Setup, SBA=Standby Assistance, CGA=Contact Guard Assistance, Min=Minimal Assistance, Mod=Moderate Assistance, Max=Maximal Assistance, Total=Total Assistance, NT=Not Tested    BALANCE: Good Fair+ Fair Fair- Poor NT Comments   Sitting Static [] [] [] [] [] []    Sitting Dynamic [] [x] [] [] [] []              Standing Static [] [x] [] [] [] []    Standing Dynamic [] [x] [x] [] [] []        PLAN:     FREQUENCY/DURATION   OT Plan of Care: 3 times/week for duration of hospital stay or until stated goals are met, whichever comes first.      TREATMENT:     TREATMENT:   Self Care (23 minutes): Patient participated in upper body bathing, lower body bathing, toileting, upper body dressing, and grooming ADLs in unsupported sitting with minimal verbal cueing to increase independence, decrease assistance required, and increase activity tolerance. Patient also participated in functional mobility, functional transfer, and energy conservation training to increase independence, decrease assistance required, increase activity tolerance, and increase safety awareness.      TREATMENT GRID:  N/A    AFTER TREATMENT PRECAUTIONS: Call light within reach, Chair, Needs within reach, RN notified, and Visitors at bedside    INTERDISCIPLINARY COLLABORATION:  RN/ PCT, PT/ PTA, and OT/ RAI    EDUCATION:        TOTAL TREATMENT DURATION AND TIME:  Time In: 9670  Time Out: 2051 [facility]  Minutes: [address], OTR/L, CLT

## 2022-12-07 NOTE — PROGRESS NOTES
LTG: Patient will tolerate least restrictive oral diet without overt s/sx of airway compromise. STG: Patient will consume regular diet and thin liquids without overt s/sx of airway compromise. STG: Patient will participate in ongoing po trials with SLP in attempt to identify least restrictive oral diet. STG: Patient will participate in modified barium swallow study to objectively assess swallow function as medically indicated. SPEECH LANGUAGE PATHOLOGY: DYSPHAGIA  Daily Note #1    NAME: Darshana Gonzalez  : 1935  MRN: [id number]    ADMISSION DATE: 12/3/2022  PRIMARY DIAGNOSIS: Acute respiratory failure (HCC)  Acute respiratory failure (HCC) [J96.00]  Acute congestive heart failure, unspecified heart failure type ([facility] [[email or web address] [I50.9]    ICD-10: Treatment Diagnosis: R13.19 Other Dysphagia    RECOMMENDATIONS   Diet:  Diet Solids Recommendation: Easy to Chew (Discusses current diet level with patient. Due to patient's respiratory status patient is requesting soft food textures - will change diet to easy to chew foods. Patient is appropriate to be advanced to regular diet at her request.)  Liquid Consistency Recommendation: Thin    Medications: PO     Recommendations: Dysphagia treatment      Compensatory Swallowing Strategies: Upright as possible for all oral intake;Small bites/sips; Other (comments) (Take frequent breaks due to respiratory status.)   Patient continues to require skilled intervention: Yes  D/C Recommendations: To be determined     ASSESSMENT    Dysphagia Diagnosis: Swallow function appears WFL  Dysphagia Impression : As able to be assessed at bedside. No overt signs of aspiration noted with PO intake on this date. Family did report that patient has exhibited cough with PO intake during hospitalization though coughing is not exclusive to PO intake - patient reports cough presents as related to respiratory status and not swallowing difficulty.        GENERAL    History of Present Injury/Illness: Ms. Sunny Haro  has a past medical history of Chest pain, Dyspnea, GERD (gastroesophageal reflux disease), Headache, HTN (hypertension), benign, Malaise and fatigue, Paroxysmal atrial fibrillation ([address] Preoperative cardiovascular examination, Psychiatric disorder, PUD (peptic ulcer disease), Thromboembolus ([address] and Thyroid disease. . She also  has a past surgical history that includes lap,cholecystectomy; Breast reduction surgery; Hysterectomy; pr appendectomy; eye surgery; ep device procedure (N/A, 10/19/2022); and ep device procedure (N/A, 10/19/2022). Chart Reviewed: Yes  Subjective: Patient awake,alert when engaged at bedside. Family present during session. Behavior/Cognition: Alert; Cooperative;Pleasant mood  Communication Observation: Functional  Follows Directions: Simple       Patient Complaint: Patient acknowledges difficulty with respiratory status, otherwise reports feeling \"OK\". Current Diet : Regular  Current Liquid Diet : Thin          O2 Device: High flow nasal cannula  Liters of Oxygen:  (FiO2 at 100% and at 60 LPM)     Pain: Denied by patient. OBJECTIVE    Patient Positioning: Upright in bed   Oral Motor   Labial: No impairment  Dentition: Intact  Oral Hygiene: Moist;Clean  Lingual: No impairment  Mandible: No impairment  Dentition: Dentures top;Dentures bottom        Baseline Vocal Quality: Normal    Oropharyngeal Phase:     Assessment Method(s): Observation  Vocal Quality: No Impairment  Consistency Presented: Regular; Thin  How Presented: Self-fed/presented  Oral Residue: None  Aspiration Signs/Symptoms: None  Pharyngeal Phase Characteristics: No impairment, issues, or problems        Oral Phase - Comment: Patient agreeable to PO trials consuming solids/sia cracker in 1/8 amounts x's 3 and thin liquids in unmeasured amounts via straw x's 6. Patient able to feed self post set up assistance. Oral phase of swallow was unremarkable.   Pharyngeal Phase: Phayrngeal phase of swallow was unremarkable as well. No overt signs of aspiration. Patient denied difficulty with all PO intake though acknowledges needing frequent breaks due to respiratory status. PLAN    Duration/Frequency: Continue to follow patient 2x/week for duration of hospitalization and/or until goals met    Dysphagia Outcome and Severity Scale (CHELA)  Dysphagia Outcome Severity Scale: Level 6: Within functional limits/Modified independence  Interpretation of Tool: The Dysphagia Outcome and Severity Scale (CHELA) is a simple, easy-to-use, 7-point scale developed to systematically rate the functional severity of dysphagia based on objective assessment and make recommendations for diet level, independence level, and type of nutrition. Normal(7), Functional(6), Mild(5), Mild-Moderate(4), Moderate(3), Moderate-Severe(2), Severe(1)    Speech Therapy Prognosis  Prognosis: Good  Prognosis Considerations: Previous Level of Function    Education:    Patient Education: Provided as to role of ST services, diet options, ST plan of care. Patient requesting softer textured foods due to respiratory status. Patient agreeable for speech therapy to follow up to ensure diet tolerance during hospitalization.   Patient Education Response: Verbalizes understanding    PRECAUTIONS/ALLERGIES: Aloe vera, Metoclopramide, Morphine, Penicillins, Sulfa antibiotics, Sulfamethoxazole-trimethoprim, and Adhesive tape   Safety Devices in place: Yes    Therapy Time  SLP Individual Minutes  Time In: 1280  Time Out: 9526  Minutes: Nani [address] [place] [address]  12/7/2022 10:11 AM

## 2022-12-07 NOTE — INTERDISCIPLINARY ROUNDS
Multi-D Rounds/Checklist (leapfrog):  Lines: can any be removed?: None     External Urinary Catheter (Active)      DVT Prophylaxis: Ordered  Vent: N/A  Nutrition Ordered/appropriate: Ordered  Can antibiotics or other drugs be stopped? Is Nozin performed: Yes/End Date set  Consults needed: None  A: Is pain control adequate? (has PRNs? Stop drip?) Yes  B: Sedation break and SBT? N/A  C: Is sedation choice appropriate? N/A  D: Delirium/CAM-ICU? No  E: Mobility goals/appropriateness? Yes  F: Family update and plan? daughter is primary contact and is being updated daily by primary attending and nursing staff.     Ml Fried, APRN - NP Prophylactic measure Morbid obesity with BMI of 40.0-44.9, adult

## 2022-12-07 NOTE — PROGRESS NOTES
VANCO DAILY FOLLOW UP NOTE  [facility] Pharmacokinetic Monitoring Service - Vancomycin    Consulting Provider: Dr. Eliseo Gong   Indication: HAP  Target Concentration: Goal AUC/JOLIE 400-600 mg*hr/L  Day of Therapy: 1/7  Additional Antimicrobials: cefepime, doxycycline    Patient eligible for piperacillin-tazobactam to cefepime auto-substitution per P&T approved protocol? N/A    Pertinent Laboratory Values: Wt Readings from Last 1 Encounters:   12/04/22 195 lb 12.3 oz (88.8 kg)     Temp Readings from Last 1 Encounters:   12/07/22 97.9 °F (36.6 °C) (Axillary)     Recent Labs     12/05/22  0312 12/06/22  0428 12/06/22  1531 12/07/22  0437   BUN 22 27*  --  44*   CREATININE 0.80 0.70  --  1.00   WBC 10.1 15.0* 16.6* 14.5*     Estimated Creatinine Clearance: 46 mL/min (based on SCr of 1 mg/dL). No results found for: Cherelle Yin    MRSA Nasal Swab: was negative on 12/5      Assessment:  Date/Time Dose Concentration AUC         Note: Serum concentrations collected for AUC dosing may appear elevated if collected in close proximity to the dose administered, this is not necessarily an indication of toxicity    Plan:  Dosing recommendations based on Bayesian software  Start vancomycin 2000 mg IV x 1 followed by 1250 mg IV q24h  Anticipated AUC of 494 and trough concentration of 15.3 at steady state  Renal labs as indicated   Vancomycin concentrations will be ordered as clinically appropriate   Pharmacy will continue to monitor patient and adjust therapy as indicated    Thank you for the consult,  Charla Iglesias.  SANDI Batres [facility]

## 2022-12-07 NOTE — PROGRESS NOTES
[facility] Critical Care Note[de-identified] 12/7/2022  Fernando Russell  Admission Date: 12/3/2022     Length of Stay: 4 days    Background: 82 y.o. female with a history of SSS s/p PCM, PAF on eliquis, recent CAP, obesity, and DVT. Presented to ER 12/3 with increased dyspnea, edema, orthopnea. Sat 53% on arrival. Normal WBC, BNP 2517, CXR with interstitial infiltrates. Admitted to ICU and diuresed. Placed on CPAP. TTE was normal. RVP was normal.       Notable PMH:  has a past medical history of Chest pain, Dyspnea, GERD (gastroesophageal reflux disease), Headache, HTN (hypertension), benign, Malaise and fatigue, Paroxysmal atrial fibrillation ([address] Preoperative cardiovascular examination, Psychiatric disorder, PUD (peptic ulcer disease), Thromboembolus ([address] and Thyroid disease. 24 Hour events: Pt is currently on 100% CPAP. Was able to remain on optiflow most of the day yesterday but on 100%. CXR without much change today. Review of Systems: Comprehensive ROS negative except in HPI    Lines: (insertion date)    External Urinary Catheter (Active)      Drips: current dose (range)        Pertinent Exam:         Blood pressure (!) 156/64, pulse 60, temperature 98.3 °F (36.8 °C), temperature source Axillary, resp. rate 22, height 5' 6\" (1.676 m), weight 195 lb 12.3 oz (88.8 kg), SpO2 98 %. Intake/Output Summary (Last 24 hours) at 12/7/2022 0730  Last data filed at 12/7/2022 0515  Gross per 24 hour   Intake 970 ml   Output 1575 ml   Net -605 ml     Constitutional:  NAD, resting in bed  EENMT:  Sclera clear, pupils equal, oral mucosa moist  Respiratory: CTA B in anterior lung fields. Cardiovascular:  RRR, no m/r/g  Gastrointestinal:  soft with no tenderness; positive bowel sounds present  Musculoskeletal:  warm with no cyanosis, no lower extremity edema  Skin:  no jaundice or ecchymosis  Neurologic: alert and oriented. Equal strength and sensation in all extremities. Psychiatric: calm.      CXR: 12/7/22 12/5/22 12/5/22      Recent Labs     12/06/22  0428 12/06/22  1531 12/07/22  0437   WBC 15.0* 16.6* 14.5*   HGB 8.0* 8.4* 8.2*   HCT 26.6* 27.7* 27.8*    292 234     Recent Labs     12/04/22  1343 12/05/22  0312 12/06/22  0428 12/07/22  0437   NA  --  136 133 137   K 3.5 3.5 3.6 3.1*   CL  --  102 100* 101   CO2  --  29 31 29   GLUCOSE  --  97 129* 137*   BUN  --  22 27* 44*   CREATININE  --  0.80 0.70 1.00   MG 2.2 1.8  --  1.8     No results for input(s): TROPHS, NTPROBNP, CRP, ESR in the last 72 hours. Recent Labs     12/05/22  0312 12/06/22  0428 12/07/22  0437   GLUCOSE 97 129* 137*      ECHO: 12/03/22    TRANSTHORACIC ECHOCARDIOGRAM (TTE) COMPLETE (CONTRAST/BUBBLE/3D PRN) 12/04/2022  1:05 PM (Final)    Interpretation Summary    Left Ventricle: Normal left ventricular systolic function with a visually estimated EF of 55 - 60%. Left ventricle size is normal. Normal wall thickness. Normal wall motion. Normal diastolic function. Technical qualifiers: Color flow Doppler was performed and pulse wave and/or continuous wave Doppler was performed. Signed by: Hayden Ibarra MD on 12/4/2022  1:05 PM    Microbiology:   Recent Labs     12/05/22 2151 12/05/22  0443   CULTURE NO GROWTH 1 DAY SA target not detected. A MRSA NEGATIVE, SA NEGATIVE test result does not preclude MRSA or SA nasal colonization. Ventilator Settings Ideal body weight: 59.3 kg (130 lb 11.7 oz)  Adjusted ideal body weight: 71.1 kg (156 lb 12 oz)  Mode FIO2 Rate Tidal Volume Pressure        100 %                  Peak airway pressure:     Minute ventilation:    ABG:  Recent Labs     12/06/22  0300   PHAPOC 7.43   [id number] 42.0   [id number] 60*   [id number] 27.9*   BE 3.0     Assessment and Plan:  (Medical Decision Making)   Impression: 82 y.o. female with with history of SSS s/p pacer, a fib. Presenting with acute hypoxic respiratory failure.  Initially felt to be due to volume overload but now appearing euvolemic and CXR not changes, ongoing high O2 needs, and now with fever. Not producing sputum for culture. RVP negative. Blood culture negative. NEURO:   Sedation: none  Analgesia: none  CV:   Volume Status: appears euvolemic. Getting lasix 40 IV bid. Creatinine slightly higher today. Repeat tomorrow and if continues to climb would drop dose to daily  A fib: currently in NSR. Continue eliquis, metoprolol, and flecainide  PULM:   Acute hypoxemic respiratory failure:  patient up to 100% airvo. Marginal sats. Needing CPAP at night and occasionally during the day. Long discussion with family yesterday about the potential need for vent support. They are ok with this if needed. CT scan with diffuse GGO. Started IV steroids 12/5 to cover possible inflammatory processes. Not much change yet. Will increase to 1g daily x 3 days then drop back down to 60 q 6. On cefepime and doxy. Add vanc x 7 days. RENAL:  Electrolytes: replace as needed - K today  GI:   Nutrition: PO  HEME:   Anemia: hgb stable in 8's today. On eliquis for a fib. Continue for now and monitor. Anticoagulation: eliquis  ID:   PNA: based on CXR with infiltrates and fever. WBC up with addition of steroids. RVP negative. Pt not producing sputum for culture. ER visit 11/17 with similar symptoms and CXR appearance. Cont cefepime and doxy. Add vanc 12/7  ENDO:   Hypothyroid: cont synthroid  Skin: no decub, turns, preventive care  Prophy: protonix and eliquis. Full Code    The patient is critically ill with respiratory failure, circulatory failure and requires high complexity decision making for assessment and support including frequent ventilator adjustment, frequent evaluation and titration of therapies , application of advanced monitoring technologies and extensive interpretation of multiple databases    Cumulative time devoted to patient care services by me for day of service is 22 mins.     Helen Gonzalez MD

## 2022-12-07 NOTE — PROGRESS NOTES
Danette Robbins states, \"Okay\"     Social/Functional Lives With: Spouse  Type of Home: House  Home Layout: One level  Home Access: Stairs to enter with rails  Entrance Stairs - Number of Steps: 2  Home Equipment: Cane  ADL Assistance: Independent  Active : Yes  OBJECTIVE:     PAIN: Stephanie Revering / O2: Adam Martell / Bettina Rose / Yolanda Cough:   Pre Treatment:   Pain Assessment: 0-10  Pain Level: 6  Pain Location: Generalized      Post Treatment: 6 Vitals        Oxygen    Continuous Pulse Oximetry, IV, Purewick, and Telemetry     RESTRICTIONS/PRECAUTIONS:  Restrictions/Precautions  Restrictions/Precautions: Fall Risk  Restrictions/Precautions: Fall Risk     MOBILITY: I Mod I S SBA CGA Min Mod Max Total  NT x2 Comments:   Bed Mobility    Rolling [] [] [] [] [] [x] [] [] [] [] [x]    Supine to Sit [] [] [] [] [] [x] [] [] [] [] [x]    Scooting [] [] [] [] [] [] [] [] [] [] []    Sit to Supine [] [] [] [] [] [] [] [] [] [] []    Transfers    Sit to Stand [] [] [] [] [] [x] [] [] [] [] [x]    Bed to Chair [] [] [] [] [] [x] [] [] [] [] [x]    Stand to Sit [] [] [] [] [] [x] [] [] [] [] [x]     [] [] [] [] [] [] [] [] [] [] []    I=Independent, Mod I=Modified Independent, S=Supervision, SBA=Standby Assistance, CGA=Contact Guard Assistance,   Min=Minimal Assistance, Mod=Moderate Assistance, Max=Maximal Assistance, Total=Total Assistance, NT=Not Tested    BALANCE: Good Fair+ Fair Fair- Poor NT Comments   Sitting Static [x] [] [] [] [] []    Sitting Dynamic [] [x] [] [] [] []              Standing Static [] [x] [] [] [] []    Standing Dynamic [] [] [x] [] [] []      GAIT: I Mod I S SBA CGA Min Mod Max Total  NT x2 Comments:   Level of Assistance [] [] [] [] [] [x] [] [] [] [] [x]    Distance 4 feet    DME B HHA    Gait Quality Decreased step length    Weightbearing Status      Stairs      I=Independent, Mod I=Modified Independent, S=Supervision, SBA=Standby Assistance, CGA=Contact Guard Assistance,   Min=Minimal Assistance, Mod=Moderate Assistance, Max=Maximal Assistance, Total=Total Assistance, NT=Not Tested    PLAN:   FREQUENCY AND DURATION: 3 times/week for duration of hospital stay or until stated goals are met, whichever comes first.    TREATMENT:   TREATMENT:   Co-Treatment PT/OT necessary due to patient's decreased overall endurance/tolerance levels, as well as need for high level skilled assistance to complete functional transfers/mobility and functional tasks  Therapeutic Activity (23 Minutes): Therapeutic activity included Rolling, Supine to Sit, Scooting, Transfer Training, Ambulation on level ground, and Standing balance to improve functional Activity tolerance, Balance, Mobility, and Strength. TREATMENT GRID:  N/A    AFTER TREATMENT PRECAUTIONS: Bed/Chair Locked, Call light within reach, Chair, Needs within reach, RN notified, and Visitors at bedside    INTERDISCIPLINARY COLLABORATION:  RN/ PCT, PT/ PTA, and OT/ RAI    EDUCATION:      TIME IN/OUT:  Time In: 1035  Time Out: 2051 [facility]  Minutes: 40 Zee Curtis Broussard.  Nichol Mccormack

## 2022-12-07 NOTE — CARE COORDINATION
Chart reviewed as pt continues ICU. CPAP 100%/optiflow yesterday per notes. No gtts currently. CM following for any assist and d/c needs/POC. Currently resumption of [facility]. LOS 4 days.

## 2022-12-07 NOTE — PROGRESS NOTES
A follow up visit was made to the patient. Emotional support, spiritual presence and   prayer were provided for the patient. She was smiling and shared that she was doing much better. Her  and her daughter were present.       Gloria Cárdenas, [address] [facility], [facility]

## 2022-12-08 ENCOUNTER — APPOINTMENT (OUTPATIENT)
Dept: ULTRASOUND IMAGING | Age: 87
End: 2022-12-08
Payer: MEDICARE

## 2022-12-08 LAB
ANA SER QL: POSITIVE
ANION GAP SERPL CALC-SCNC: 4 MMOL/L (ref 2–11)
BASOPHILS # BLD: 0 K/UL (ref 0–0.2)
BASOPHILS NFR BLD: 0 % (ref 0–2)
BUN SERPL-MCNC: 53 MG/DL (ref 8–23)
CALCIUM SERPL-MCNC: 9.7 MG/DL (ref 8.3–10.4)
CENTROMERE B AB SER-ACNC: <0.2 AI (ref 0–0.9)
CHLORIDE SERPL-SCNC: 100 MMOL/L (ref 101–110)
CHROMATIN AB SERPL-ACNC: <0.2 AI (ref 0–0.9)
CO2 SERPL-SCNC: 31 MMOL/L (ref 21–32)
CREAT SERPL-MCNC: 1.1 MG/DL (ref 0.6–1)
DIFFERENTIAL METHOD BLD: ABNORMAL
DSDNA AB SER-ACNC: <1 IU/ML (ref 0–9)
ENA JO1 AB SER-ACNC: <0.2 AI (ref 0–0.9)
ENA RNP AB SER-ACNC: <0.2 AI (ref 0–0.9)
ENA SCL70 AB SER-ACNC: 5.6 AI (ref 0–0.9)
ENA SM AB SER-ACNC: <0.2 AI (ref 0–0.9)
ENA SS-A AB SER-ACNC: <0.2 AI (ref 0–0.9)
ENA SS-B AB SER-ACNC: <0.2 AI (ref 0–0.9)
EOSINOPHIL # BLD: 0 K/UL (ref 0–0.8)
EOSINOPHIL NFR BLD: 0 % (ref 0.5–7.8)
ERYTHROCYTE [DISTWIDTH] IN BLOOD BY AUTOMATED COUNT: 17.7 % (ref 11.9–14.6)
GLUCOSE SERPL-MCNC: 148 MG/DL (ref 65–100)
HCT VFR BLD AUTO: 26.7 % (ref 35.8–46.3)
HGB BLD-MCNC: 8.3 G/DL (ref 11.7–15.4)
IMM GRANULOCYTES # BLD AUTO: 0.3 K/UL (ref 0–0.5)
IMM GRANULOCYTES NFR BLD AUTO: 2 % (ref 0–5)
LYMPHOCYTES # BLD: 0.6 K/UL (ref 0.5–4.6)
LYMPHOCYTES NFR BLD: 5 % (ref 13–44)
Lab: ABNORMAL
MCH RBC QN AUTO: 28.1 PG (ref 26.1–32.9)
MCHC RBC AUTO-ENTMCNC: 31.1 G/DL (ref 31.4–35)
MCV RBC AUTO: 90.5 FL (ref 82–102)
MONOCYTES # BLD: 0.3 K/UL (ref 0.1–1.3)
MONOCYTES NFR BLD: 3 % (ref 4–12)
NEUTS SEG # BLD: 11.3 K/UL (ref 1.7–8.2)
NEUTS SEG NFR BLD: 90 % (ref 43–78)
NRBC # BLD: 0 K/UL (ref 0–0.2)
PLATELET # BLD AUTO: 247 K/UL (ref 150–450)
PMV BLD AUTO: 9.3 FL (ref 9.4–12.3)
POTASSIUM SERPL-SCNC: 3.1 MMOL/L (ref 3.5–5.1)
RBC # BLD AUTO: 2.95 M/UL (ref 4.05–5.2)
SODIUM SERPL-SCNC: 135 MMOL/L (ref 133–143)
WBC # BLD AUTO: 12.6 K/UL (ref 4.3–11.1)

## 2022-12-08 PROCEDURE — 2000000000 HC ICU R&B

## 2022-12-08 PROCEDURE — 2500000003 HC RX 250 WO HCPCS: Performed by: INTERNAL MEDICINE

## 2022-12-08 PROCEDURE — 80048 BASIC METABOLIC PNL TOTAL CA: CPT

## 2022-12-08 PROCEDURE — 94640 AIRWAY INHALATION TREATMENT: CPT

## 2022-12-08 PROCEDURE — 6360000002 HC RX W HCPCS: Performed by: INTERNAL MEDICINE

## 2022-12-08 PROCEDURE — 2580000003 HC RX 258: Performed by: INTERNAL MEDICINE

## 2022-12-08 PROCEDURE — 36415 COLL VENOUS BLD VENIPUNCTURE: CPT

## 2022-12-08 PROCEDURE — 99233 SBSQ HOSP IP/OBS HIGH 50: CPT | Performed by: INTERNAL MEDICINE

## 2022-12-08 PROCEDURE — 2580000003 HC RX 258: Performed by: EMERGENCY MEDICINE

## 2022-12-08 PROCEDURE — 2580000003 HC RX 258: Performed by: FAMILY MEDICINE

## 2022-12-08 PROCEDURE — 6370000000 HC RX 637 (ALT 250 FOR IP): Performed by: FAMILY MEDICINE

## 2022-12-08 PROCEDURE — 6360000002 HC RX W HCPCS: Performed by: STUDENT IN AN ORGANIZED HEALTH CARE EDUCATION/TRAINING PROGRAM

## 2022-12-08 PROCEDURE — 94761 N-INVAS EAR/PLS OXIMETRY MLT: CPT

## 2022-12-08 PROCEDURE — 85025 COMPLETE CBC W/AUTO DIFF WBC: CPT

## 2022-12-08 PROCEDURE — 6370000000 HC RX 637 (ALT 250 FOR IP): Performed by: STUDENT IN AN ORGANIZED HEALTH CARE EDUCATION/TRAINING PROGRAM

## 2022-12-08 PROCEDURE — 6370000000 HC RX 637 (ALT 250 FOR IP)

## 2022-12-08 PROCEDURE — 76937 US GUIDE VASCULAR ACCESS: CPT

## 2022-12-08 PROCEDURE — 99232 SBSQ HOSP IP/OBS MODERATE 35: CPT | Performed by: INTERNAL MEDICINE

## 2022-12-08 PROCEDURE — 2700000000 HC OXYGEN THERAPY PER DAY

## 2022-12-08 RX ORDER — FUROSEMIDE 10 MG/ML
40 INJECTION INTRAMUSCULAR; INTRAVENOUS DAILY
Status: DISCONTINUED | OUTPATIENT
Start: 2022-12-09 | End: 2022-12-09 | Stop reason: HOSPADM

## 2022-12-08 RX ORDER — SODIUM CHLORIDE FOR INHALATION 3 %
4 VIAL, NEBULIZER (ML) INHALATION 2 TIMES DAILY
Status: DISCONTINUED | OUTPATIENT
Start: 2022-12-08 | End: 2022-12-09 | Stop reason: HOSPADM

## 2022-12-08 RX ORDER — GUAIFENESIN 600 MG/1
1200 TABLET, EXTENDED RELEASE ORAL 2 TIMES DAILY
Status: DISCONTINUED | OUTPATIENT
Start: 2022-12-08 | End: 2022-12-09 | Stop reason: HOSPADM

## 2022-12-08 RX ORDER — METHYLPREDNISOLONE SODIUM SUCCINATE 125 MG/2ML
60 INJECTION, POWDER, LYOPHILIZED, FOR SOLUTION INTRAMUSCULAR; INTRAVENOUS EVERY 6 HOURS
Status: DISCONTINUED | OUTPATIENT
Start: 2022-12-08 | End: 2022-12-09

## 2022-12-08 RX ADMIN — METHYLPREDNISOLONE SODIUM SUCCINATE 60 MG: 125 INJECTION, POWDER, FOR SOLUTION INTRAMUSCULAR; INTRAVENOUS at 10:34

## 2022-12-08 RX ADMIN — CEFEPIME 2000 MG: 2 INJECTION, POWDER, FOR SOLUTION INTRAVENOUS at 10:34

## 2022-12-08 RX ADMIN — CEFEPIME 2000 MG: 2 INJECTION, POWDER, FOR SOLUTION INTRAVENOUS at 21:35

## 2022-12-08 RX ADMIN — APIXABAN 5 MG: 5 TABLET, FILM COATED ORAL at 20:30

## 2022-12-08 RX ADMIN — PANTOPRAZOLE SODIUM 40 MG: 40 TABLET, DELAYED RELEASE ORAL at 04:27

## 2022-12-08 RX ADMIN — HYDROCODONE BITARTRATE AND ACETAMINOPHEN 1 TABLET: 7.5; 325 TABLET ORAL at 04:26

## 2022-12-08 RX ADMIN — MOMETASONE FUROATE AND FORMOTEROL FUMARATE DIHYDRATE 2 PUFF: 200; 5 AEROSOL RESPIRATORY (INHALATION) at 20:10

## 2022-12-08 RX ADMIN — MOMETASONE FUROATE AND FORMOTEROL FUMARATE DIHYDRATE 2 PUFF: 200; 5 AEROSOL RESPIRATORY (INHALATION) at 08:54

## 2022-12-08 RX ADMIN — METHYLPREDNISOLONE SODIUM SUCCINATE 60 MG: 125 INJECTION, POWDER, FOR SOLUTION INTRAMUSCULAR; INTRAVENOUS at 18:03

## 2022-12-08 RX ADMIN — GUAIFENESIN 1200 MG: 600 TABLET ORAL at 10:35

## 2022-12-08 RX ADMIN — SODIUM CHLORIDE, PRESERVATIVE FREE 5 ML: 5 INJECTION INTRAVENOUS at 15:15

## 2022-12-08 RX ADMIN — DOXYCYCLINE 100 MG: 100 INJECTION, POWDER, LYOPHILIZED, FOR SOLUTION INTRAVENOUS at 10:34

## 2022-12-08 RX ADMIN — DOXYCYCLINE 100 MG: 100 INJECTION, POWDER, LYOPHILIZED, FOR SOLUTION INTRAVENOUS at 21:45

## 2022-12-08 RX ADMIN — FLECAINIDE ACETATE 75 MG: 50 TABLET ORAL at 20:05

## 2022-12-08 RX ADMIN — METHYLPREDNISOLONE SODIUM SUCCINATE 250 MG: 125 INJECTION, POWDER, FOR SOLUTION INTRAMUSCULAR; INTRAVENOUS at 04:27

## 2022-12-08 RX ADMIN — LEVOTHYROXINE SODIUM 50 MCG: 0.05 TABLET ORAL at 04:27

## 2022-12-08 RX ADMIN — METOPROLOL SUCCINATE 50 MG: 50 TABLET, EXTENDED RELEASE ORAL at 08:09

## 2022-12-08 RX ADMIN — POTASSIUM CHLORIDE 40 MEQ: 1500 TABLET, EXTENDED RELEASE ORAL at 06:18

## 2022-12-08 RX ADMIN — FLECAINIDE ACETATE 75 MG: 50 TABLET ORAL at 08:09

## 2022-12-08 RX ADMIN — SENNOSIDES AND DOCUSATE SODIUM 2 TABLET: 8.6; 5 TABLET ORAL at 20:05

## 2022-12-08 RX ADMIN — SODIUM CHLORIDE, PRESERVATIVE FREE 10 ML: 5 INJECTION INTRAVENOUS at 08:08

## 2022-12-08 RX ADMIN — SODIUM CHLORIDE, PRESERVATIVE FREE 5 ML: 5 INJECTION INTRAVENOUS at 21:37

## 2022-12-08 RX ADMIN — HYDROXYZINE HYDROCHLORIDE 25 MG: 10 TABLET ORAL at 21:35

## 2022-12-08 RX ADMIN — HYDROCODONE BITARTRATE AND ACETAMINOPHEN 1 TABLET: 7.5; 325 TABLET ORAL at 18:35

## 2022-12-08 RX ADMIN — APIXABAN 5 MG: 5 TABLET, FILM COATED ORAL at 08:10

## 2022-12-08 RX ADMIN — TIMOLOL MALEATE 1 DROP: 5 SOLUTION OPHTHALMIC at 08:10

## 2022-12-08 RX ADMIN — HYDROCODONE BITARTRATE AND ACETAMINOPHEN 1 TABLET: 7.5; 325 TABLET ORAL at 13:07

## 2022-12-08 RX ADMIN — FLUOXETINE 60 MG: 20 CAPSULE ORAL at 08:09

## 2022-12-08 RX ADMIN — FUROSEMIDE 40 MG: 10 INJECTION, SOLUTION INTRAMUSCULAR; INTRAVENOUS at 08:09

## 2022-12-08 RX ADMIN — AMITRIPTYLINE HYDROCHLORIDE 25 MG: 25 TABLET, FILM COATED ORAL at 20:06

## 2022-12-08 RX ADMIN — METHYLPREDNISOLONE SODIUM SUCCINATE 60 MG: 125 INJECTION, POWDER, FOR SOLUTION INTRAMUSCULAR; INTRAVENOUS at 22:00

## 2022-12-08 RX ADMIN — SODIUM CHLORIDE, PRESERVATIVE FREE 5 ML: 5 INJECTION INTRAVENOUS at 08:08

## 2022-12-08 RX ADMIN — FERROUS SULFATE TAB 325 MG (65 MG ELEMENTAL FE) 325 MG: 325 (65 FE) TAB at 08:10

## 2022-12-08 RX ADMIN — GUAIFENESIN 1200 MG: 600 TABLET ORAL at 20:06

## 2022-12-08 RX ADMIN — SODIUM CHLORIDE, PRESERVATIVE FREE 10 ML: 5 INJECTION INTRAVENOUS at 20:07

## 2022-12-08 ASSESSMENT — PAIN SCALES - GENERAL
PAINLEVEL_OUTOF10: 0
PAINLEVEL_OUTOF10: 5
PAINLEVEL_OUTOF10: 0
PAINLEVEL_OUTOF10: 6
PAINLEVEL_OUTOF10: 4
PAINLEVEL_OUTOF10: 0
PAINLEVEL_OUTOF10: 4
PAINLEVEL_OUTOF10: 0

## 2022-12-08 ASSESSMENT — PAIN DESCRIPTION - FREQUENCY
FREQUENCY: CONTINUOUS
FREQUENCY: CONTINUOUS

## 2022-12-08 ASSESSMENT — PAIN DESCRIPTION - ONSET
ONSET: GRADUAL
ONSET: GRADUAL

## 2022-12-08 ASSESSMENT — PAIN DESCRIPTION - DESCRIPTORS
DESCRIPTORS: ACHING
DESCRIPTORS: DISCOMFORT;ACHING

## 2022-12-08 ASSESSMENT — PAIN DESCRIPTION - LOCATION
LOCATION: BACK;KNEE
LOCATION: OTHER (COMMENT)

## 2022-12-08 ASSESSMENT — PAIN DESCRIPTION - PAIN TYPE
TYPE: CHRONIC PAIN
TYPE: CHRONIC PAIN

## 2022-12-08 ASSESSMENT — PAIN DESCRIPTION - ORIENTATION: ORIENTATION: POSTERIOR;RIGHT;LEFT

## 2022-12-08 ASSESSMENT — PAIN - FUNCTIONAL ASSESSMENT: PAIN_FUNCTIONAL_ASSESSMENT: ACTIVITIES ARE NOT PREVENTED

## 2022-12-08 NOTE — INTERDISCIPLINARY ROUNDS
Multi-D Rounds/Checklist (leapfrog):  Lines: can any be removed?: None     External Urinary Catheter (Active)      DVT Prophylaxis: Ordered  Vent: N/A 100% Airvo  Nutrition Ordered/appropriate: Ordered  Can antibiotics or other drugs be stopped? Is Nozin performed: Yes/End Date set  Consults needed: None  A: Is pain control adequate? (has PRNs? Stop drip?) Yes  B: Sedation break and SBT? N/A  C: Is sedation choice appropriate? N/A  D: Delirium/CAM-ICU? No  E: Mobility goals/appropriateness? Yes  F: Family update and plan? daughter is primary contact and is being updated daily by primary attending and nursing staff.     Ml Fried, APRN - NP

## 2022-12-08 NOTE — PROGRESS NOTES
VANCO DAILY FOLLOW UP NOTE  [address] [facility] Pharmacokinetic Monitoring Service - Vancomycin    Consulting Provider: Dr. Deidra Henderson   Indication: HAP  Target Concentration: Goal AUC/JOLIE 400-600 mg*hr/L  Day of Therapy: 2/7  Additional Antimicrobials: cefepime, doxycycline    Patient eligible for piperacillin-tazobactam to cefepime auto-substitution per P&T approved protocol? N/A    Pertinent Laboratory Values: Wt Readings from Last 1 Encounters:   12/04/22 195 lb 12.3 oz (88.8 kg)     Temp Readings from Last 1 Encounters:   12/08/22 97.8 °F (36.6 °C) (Oral)     Recent Labs     12/06/22  0428 12/06/22  1531 12/07/22  0437 12/08/22  0426   BUN 27*  --  44* 53*   CREATININE 0.70  --  1.00 1.10*   WBC 15.0* 16.6* 14.5* 12.6*     Estimated Creatinine Clearance: 42 mL/min (A) (based on SCr of 1.1 mg/dL (H)). No results found for: Kaleb Pompa    MRSA Nasal Swab: was negative on 12/5      Assessment:  Date/Time Dose Concentration AUC         Note: Serum concentrations collected for AUC dosing may appear elevated if collected in close proximity to the dose administered, this is not necessarily an indication of toxicity    Plan:  Dosing recommendations based on Bayesian software  Continue vancomycin 1250 mg IV q24h  Anticipated AUC of 494 and trough concentration of 15.3 at steady state  Renal labs as indicated   Vancomycin concentration ordered for 12/9 @ 0400  Pharmacy will continue to monitor patient and adjust therapy as indicated    Thank you for the consult,  Lashon Baptiste, [facility]

## 2022-12-08 NOTE — PROGRESS NOTES
[facility] CARDIOLOGY PROGRESS NOTE    12/8/2022 7:50 AM    Admit Date: 12/3/2022        Subjective:   Stable overnight without angina, CHF, or palpitations. Sinus on telemetry with PACs. Vitals stable and controlled. Respiratory status stable and comfortable on OptiFlow. No other complaints overnight. Tolerating meds well. Objective:      Vitals:    12/08/22 0323 12/08/22 0400 12/08/22 0426 12/08/22 0500   BP:  (!) 154/70  (!) 176/74   Pulse:  60  60   Resp: 26 24 29 28   Temp:  98.2 °F (36.8 °C)     TempSrc:  Oral     SpO2:  97%  92%   Weight:       Height:           Physical Exam:  Neck- supple, no JVD at 60 degrees  CV- regular rate and rhythm no MRG  Lung-fairly clear bilaterally posteriorly, mildly coarse in bases but no wheezing or rhonchi  Abd- soft, nontender, nondistended  Ext- no distal edema  Skin- warm and dry    Data Review:   Recent Labs     12/07/22  0437 12/08/22  0426    135   K 3.1* 3.1*   MG 1.8  --    BUN 44* 53*   WBC 14.5* 12.6*   HGB 8.2* 8.3*   HCT 27.8* 26.7*    247     Echo 12/4/2022:    Left Ventricle Normal left ventricular systolic function with a visually estimated EF of 55 - 60%. Left ventricle size is normal. Normal wall thickness. Normal wall motion. Normal diastolic function. Left Atrium Left atrium is mildly dilated. Right Ventricle Right ventricle size is normal. Normal wall thickness. Normal systolic function. Right Atrium Right atrium size is normal.   Aortic Valve Valve structure is normal. No regurgitation. No stenosis. Mitral Valve Mild annular calcification of the mitral valve. Mild regurgitation. No stenosis noted. Tricuspid Valve Valve structure is normal. Mild regurgitation. No stenosis noted. The estimated RVSP is mildly elevated at around 36 mmHg. Pulmonic Valve Valve structure is normal. Trace regurgitation. No stenosis noted. Aorta Normal sized aortic root and ascending aorta.    IVC/Hepatic Veins IVC diameter is less than or equal to 21 mm and decreases greater than 50% during inspiration; therefore the estimated right atrial pressure is normal (~3 mmHg). IVC size is normal.   Pericardium No pericardial effusion. Assessment and Plan: Active Hospital Problems    Acute congestive heart failure (HCC)-mild acute diastolic heart failure exacerbation, see above echo. Doing well. Diuresis as needed/tolerated. Control heart rate and blood pressure. We will continue to follow with you. Replete electrolytes today and recheck tomorrow. Pulmonary infiltrates      *Acute respiratory failure (HCC)-improving, on OptiFlow and comfortable, continue steroids per primary physicians, IV diuretics as needed. Anemia, unspecified-on Eliquis, recheck daily, keep hemoglobin greater than 8      Hypokalemia-replete IV today, recheck tomorrow      Pacemaker-follow clinically in the outpatient setting      Bradycardia      Long term (current) use of anticoagulants-back on Eliquis. Follow closely. Anticoagulant long-term use      Paroxysmal atrial fibrillation (HCC)-continue flecainide, beta-blocker, Eliquis. Follow telemetry.   Sinus with PACs today      HTN (hypertension), benign-stable, continue meds and titrate as needed     Reny Velasco MD

## 2022-12-08 NOTE — PROGRESS NOTES
Formerly [facility] Critical Care Note[de-identified] 12/8/2022  Breonna Maurer  Admission Date: 12/3/2022     Length of Stay: 5 days    Background: 82 y.o. female with a history of SSS s/p PCM, PAF on eliquis, recent CAP, obesity, and DVT. Presented to ER 12/3 with increased dyspnea, edema, orthopnea. Sat 53% on arrival. Normal WBC, BNP 2517, CXR with interstitial infiltrates. Admitted to ICU and diuresed. Placed on CPAP. TTE was normal. RVP was normal. .    Notable PMH:  has a past medical history of Chest pain, Dyspnea, GERD (gastroesophageal reflux disease), Headache, HTN (hypertension), benign, Malaise and fatigue, Paroxysmal atrial fibrillation ([address] Preoperative cardiovascular examination, Psychiatric disorder, PUD (peptic ulcer disease), Thromboembolus ([address] and Thyroid disease. 24 Hour events: Remains on 100% airvo during day. Wearing CPAP at night. Able to wean to 88% this am. States she is short of breath with talking at rest. States she is coughing but feels like mucous is stuck in her chest. Blood cultures NGTD. On high dose steroids and will wean. Can transfer out of unit once able to wean FIO2. Review of Systems: Comprehensive ROS negative except in HPI     Lines: (insertion date)    External Urinary Catheter (Active)      Drips: current dose (range)        Pertinent Exam:         Blood pressure 131/65, pulse 71, temperature 97.8 °F (36.6 °C), temperature source Oral, resp. rate 26, height 5' 6\" (1.676 m), weight 195 lb 12.3 oz (88.8 kg), SpO2 93 %.    Intake/Output Summary (Last 24 hours) at 12/8/2022 0926  Last data filed at 12/8/2022 0444  Gross per 24 hour   Intake 750 ml   Output 1325 ml   Net -575 ml     Constitutional:  no acute distress, appears comfortable  EENMT:  Sclera clear, pupils equal, oral mucosa moist  Respiratory: CTA bilaterally; on airvo 88% 60L   Cardiovascular:  RRR with no M,G,R; sinus on monitor rate 62  Gastrointestinal:  soft with no tenderness; positive bowel sounds present  Musculoskeletal:  warm with no cyanosis, no lower extremity edema, +2 non pitting edema in left arm >right   Skin:  no jaundice or ecchymosis  Neurologic: no acute abnormalities  Psychiatric: alert and oriented x4    CXR:   12/7 12/5      CT chest 12/5/22     Recent Labs     12/06/22  1531 12/07/22  0437 12/08/22  0426   WBC 16.6* 14.5* 12.6*   HGB 8.4* 8.2* 8.3*   HCT 27.7* 27.8* 26.7*    234 247     Recent Labs     12/06/22  0428 12/07/22  0437 12/08/22  0426    137 135   K 3.6 3.1* 3.1*   * 101 100*   CO2 31 29 31   GLUCOSE 129* 137* 148*   BUN 27* 44* 53*   CREATININE 0.70 1.00 1. 10*   MG  --  1.8  --      No results for input(s): TROPHS, NTPROBNP, CRP, ESR in the last 72 hours. Recent Labs     12/06/22  0428 12/07/22 0437 12/08/22  0426   GLUCOSE 129* 137* 148*      ECHO: 12/03/22    TRANSTHORACIC ECHOCARDIOGRAM (TTE) COMPLETE (CONTRAST/BUBBLE/3D PRN) 12/04/2022  1:05 PM (Final)    Interpretation Summary    Left Ventricle: Normal left ventricular systolic function with a visually estimated EF of 55 - 60%. Left ventricle size is normal. Normal wall thickness. Normal wall motion. Normal diastolic function. Technical qualifiers: Color flow Doppler was performed and pulse wave and/or continuous wave Doppler was performed. Signed by: Josie Castellanos MD on 12/4/2022  1:05 PM    Microbiology:   No results for input(s): CULTURE in the last 72 hours.   Ventilator Settings Ideal body weight: 59.3 kg (130 lb 11.7 oz)  Adjusted ideal body weight: 71.1 kg (156 lb 12 oz)  Mode FIO2 Rate Tidal Volume Pressure        (S) 88 %                  Peak airway pressure:     Minute ventilation:    ABG:  Recent Labs     12/06/22  0300   PHAPOC 7.43   CGL4ODBQ 42.0   ZA1AFTR 60*   [id number] 27.9*   BE 3.0     Assessment and Plan:  (Medical Decision Making)   Impression:  82 y.o. female with with history of SSS s/p pacer, a fib. Presenting with acute hypoxic respiratory failure. Initially felt to be due to volume overload but now appearing euvolemic and CXR not changes, ongoing high O2 needs, and now with fever. Not producing sputum for culture. RVP negative. Blood culture negative. NEURO:   Sedation: none  Analgesia: none needed  CV:   Volume Status: appears euvolemic, Net negative 3.6 L; getting IVP lasix 40 mg daily   Atrial-fib: Toprol XL, on flecainide; on eliquis; currently in SR in rate 62  PULM:   Acute hypoxemic respiratory failure:  remains on high O2 airvo, able to wean to 88% FIO2 this morning from 100%. Continue to wean as tolerates  Add mucolytics for help with secretion management   Has been on high dose steroids q6h since 12/5 for GGO on CT scan. Will wean today   RENAL:  Electrolytes: Na 135, K3.1, Mg 1.8 replace as needed per protocol  GI:   Nutrition: tolerating regular PO diet   HEME:   Anemia: Hgb stable at 8.3 today   Anticoagulation: eliquis  ID:   Pneumonia: on cefepime and vanc; MRSA negative will d/c vanc  Blood cx NGTD; unable to cough up sputum for culture; will add mucolytics- mucinex, hypertonic saline nebs  ENDO:   Hypothyroid: continue synthroid   Skin: no decub, turns, preventive care  Prophy: protonix and eliquis    Family at bedside and updated. Full Code    The patient is critically ill with respiratory failure, circulatory failure and requires high complexity decision making for assessment and support including frequent ventilator adjustment, frequent evaluation and titration of therapies , application of advanced monitoring technologies and extensive interpretation of multiple databases    In this split/shared evaluation I performed performed a medically appropriate history and exam, counseled and educated the patient and/or family member, documented information in EMR, and coordinated care. which accounted for 14 clinical time.      MALGORZATA Hollingsworth - NP    In this split/shared evaluation I performed reviewed the patients's H&P, available images, labs, cultures. , discussed case in detail with NPP, performed a medically appropriate history and exam, counseled and educated the patient and/or family member, ordered and/or reviewed medications, tests or procedures, documented information in EMR, independently interpreted images, and coordinated care. which accounted for 25 minutes clinical time. Impression:     Impression: 82 y.o. female with with history of SSS s/p pacer, a fib. Presenting with acute hypoxic respiratory failure. Initially felt to be due to volume overload but now appearing euvolemic and CXR not changes, ongoing high O2 needs, and now with fever. Not producing sputum for culture. RVP negative. Blood culture negative. PULM:   Acute hypoxemic respiratory failure:  patient up to 100% airvo. Marginal sats. Needing CPAP at night and occasionally during the day. Long discussion with family yesterday about the potential need for vent support. They are ok with this if needed. CT scan with diffuse GGO. Started IV steroids 12/5 to cover possible inflammatory processes. Not much change yet. Will increase to 1g daily x 3 days then drop back down to 60 q 6. - will drop to 60 mg   On cefepime and doxy. Add vanc x 7 days. ID:   PNA: based on CXR with infiltrates and fever. WBC up with addition of steroids. RVP negative. Pt not producing sputum for culture. ER visit 11/17 with similar symptoms and CXR appearance. Cont cefepime and doxy.   Add vanc 12/7    Efren Her MD

## 2022-12-08 NOTE — PROGRESS NOTES
This is a follow-up visit to the patient, providing a spiritual presence, emotional support and prayer. Her , Carlo Hope and their son, Jaleesa San were present. The patient shared that she feels that she is continuing to improve.     [facility], [address] [facility], [facility]

## 2022-12-08 NOTE — PROGRESS NOTES
Hospitalist Progress Note   Admit Date:  12/3/2022  2:56 PM   Name:  Melyssa Mazariegos   Age:  82 y.o. Sex:  female  :  1935   MRN:  [id number]   Room:  310/    Reason(s) for Admission: Acute respiratory failure ([facility] [J96.00]  Acute congestive heart failure, unspecified heart failure type [facility]) [I50.9]     Hospital Course & Interval History:   Mrs. Megan Etienne is an 83 y/o female with a h/o atrial fibrillation, hypothyroidism, bradycardia with PPM, fibromyalgia and GERD who was admitted to our service on 12/3 with acute hypoxemic respiratory failure felt due to atypical pneumonia vs pulmonary edema. She had a normal TTE In 2022. She was started on CPAP, IV Lasix and a nitroglycerin drip and admitted to ICU. Cardiology and Pulmonology consulted. RVP negative. Doxycycline added on . Weaned of nitro drip . She had minimal improvement with IV diuretics and CXR was unchanged so Lasix was discontinued on . CT chest  showed pan-lobar GGOs, cefepime and IV steroids were added. Subjective/24hr Events (22): Patient seen and evaluated. On Airvo. Tolerating CPAP at night. Feels that breathing is improved-   Noted swelling in the left arm  And coolness to both hands; with a tinge of blue  Afebrile overnight  Denies chest pain nausea vomiting or chills  Family at bedside all questions answered      Assessment & Plan:   # Acute hypoxemic respiratory failure              - RA sats reportedly in the 50s on arrival. CXR showed atypical infiltrates vs pulmonary edema. She was started on IV Lasix and Cardiology consulted. Echo  is normal (as was TTE in Aug) and no significant urine output or improvement with Lasix so dc on . Repeat CXRs appear the same despite Lasix. - CT chest  showed \"pan-lobar\" GGOs, cefepime and IV steroids were added. Currently on Airvo with tenuous respiratory status, may require intubation. Pulm following.     2022  Of unclear etiology  Improving with IV steroids, antibiotics and IV Lasix  She is on broad-spectrum antibiotics with no specific source of infection found. Will discontinue vancomycin   Decrease IV Lasix to daily follow-up  Will continue to monitor  Wean oxygen as tolerated  Pulmonology input appreciated    # Leukocytosis  12/8/2022  Exacerbated by steroids  Currently on high dose of 1 g/day for 3 days and then will wean per pulmonology recommendations -  She is on abx, she is afebrile, and cultures so far neg. # HypoK // hypoMg  12/8/2022  Replace     # Chronic normocytic anemia // iron def anemia  12/8/2022  Remained stable around 8           # GERD  12/8/2022  Continue PPI     # Atrial fibrillation // bradycardia s/p PPM  12/8/2022  Continue flecainide Eliquis and Lopressor     # MDD // anxiety  12/8/2022  Continue Prozac     # Hypothyroidism  12/8/2022  Continue levothyroxine     #Swollen left arm  Will obtain Doppler ultrasound to evaluate for DVT    #Coolness to both hands with some cyanotic changes  Per the patient this is not new and she is experienced this before at home  Likely some component of Raynaud's  We will have nursing warm her hands and see if it improves       #ELISA  Lasix decreased to daily from twice daily  Monitor renal function  Dose medications renal function    Discharge Planning: Remain in ICU for now. High risk for intubation.   Diet:  ADULT DIET; Easy to Chew; Low Sodium (2 gm)  DVT PPx: Eliquis  Code status: Full Code    Patient at risk for further deterioration from hypoxic respiratory failure    Patient required critical care interventions including frequent monitoring of respiratory status, oxygen saturations and vitals    Total critical care time spent: 38 min    Critical care time includes time spent at bedside performing history and exam, performing chart review, discussing findings and treatment plan with patient and/or family, discussing patient with consultants and colleagues, ordering and reviewing pertinent laboratory and radiographic evaluations, and discussing patient with nursing staff. Time excludes procedures. Hospital Problems             Last Modified POA    * (Principal) Acute respiratory failure ([facility] 12/3/2022 Yes    Pacemaker 12/3/2022 Yes    Anemia, unspecified 12/3/2022 Yes    Hypokalemia 12/3/2022 Yes    Pulmonary infiltrates 12/4/2022 Yes    Acute congestive heart failure ([address] 12/5/2022 Yes    Anticoagulant long-term use 12/3/2022 Yes    Paroxysmal atrial fibrillation ([facility] 12/3/2022 Yes    Overview Signed 3/19/2022 10:31 AM by Xavi, [employer]     Rare, fleeting palp in evening, nothing sustained.  No bleeding prob           HTN (hypertension), benign 12/3/2022 Yes    Long term (current) use of anticoagulants 12/3/2022 Yes    Bradycardia 12/3/2022 Yes    Overview Signed 8/16/2022  2:25 PM by Erica Mejias MD     Added automatically from request for surgery [id number]          Objective:   Patient Vitals for the past 24 hrs:   Temp Pulse Resp BP SpO2   12/08/22 0500 -- 60 28 (!) 176/74 92 %   12/08/22 0426 -- -- 29 -- --   12/08/22 0400 98.2 °F (36.8 °C) 60 24 (!) 154/70 97 %   12/08/22 0323 -- -- 26 -- --   12/08/22 0310 -- 68 26 (!) 165/69 96 %   12/08/22 0200 -- 63 24 (!) 146/65 97 %   12/08/22 0107 -- 62 26 (!) 93/59 95 %   12/08/22 0000 -- 63 24 (!) 162/69 97 %   12/07/22 2304 -- -- 23 -- --   12/07/22 2300 97.8 °F (36.6 °C) 60 18 (!) 154/70 95 %   12/07/22 2200 -- 60 19 (!) 168/74 96 %   12/07/22 2100 -- 60 21 (!) 160/70 96 %   12/07/22 2029 -- 60 (!) 55 -- 93 %   12/07/22 2000 97.7 °F (36.5 °C) 60 20 (!) 122/59 94 %   12/07/22 1900 -- 60 20 (!) 114/57 96 %   12/07/22 1800 -- 62 22 (!) 116/59 95 %   12/07/22 1700 -- 75 24 134/66 93 %   12/07/22 1600 -- 68 28 123/73 96 %   12/07/22 1557 -- 65 28 -- 96 %   12/07/22 1500 -- 72 27 132/63 97 %   12/07/22 1424 -- 61 26 -- 95 %   12/07/22 1400 97.9 °F (36.6 °C) 60 27 (!) 115/53 95 %   12/07/22 1300 -- 71 24 (!) 128/58 96 % 12/07/22 1200 -- 60 (!) 53 (!) 110/53 98 %   12/07/22 1100 98 °F (36.7 °C) 63 (!) 32 (!) 116/59 95 %   12/07/22 1018 -- 61 24 -- 93 %   12/07/22 1000 -- 64 29 130/61 93 %   12/07/22 0930 -- 82 29 133/86 99 %   12/07/22 0822 -- -- -- (!) 145/64 --   12/07/22 0800 -- 60 30 (!) 145/64 90 %         Estimated body mass index is 31.6 kg/m² as calculated from the following:    Height as of this encounter: 5' 6\" (1.676 m). Weight as of this encounter: 195 lb 12.3 oz (88.8 kg). Intake/Output Summary (Last 24 hours) at 12/8/2022 0749  Last data filed at 12/8/2022 0444  Gross per 24 hour   Intake 970 ml   Output 1325 ml   Net -355 ml           Physical Exam:   Blood pressure (!) 176/74, pulse 60, temperature 98.2 °F (36.8 °C), temperature source Oral, resp. rate 28, height 5' 6\" (1.676 m), weight 195 lb 12.3 oz (88.8 kg), SpO2 92 %. General:    Well nourished. Awake, oriented x3, SOB at rest.   Head:  Normocephalic, atraumatic  Eyes:  Sclerae appear normal. Pupils equally round. ENT:  Nares appear normal, no drainage. Dry oral mucosa  Neck:  No restricted ROM. Trachea midline. CV:   RRR. No m/r/g. No jugular venous distension. Lungs:   Bilateral rales, no wheezes or rhonchi. Airvo 60L/100% bedside sats low 90s at rest. Conversational dyspnea but less so than yesterday. Abdomen: Bowel sounds present. Soft, nontender, nondistended. Extremities: No cyanosis or clubbing. No edema. Skin:     No rashes and normal coloration. Warm and dry. Neuro:  CN II-XII grossly intact. Sensation intact. A&Ox3  Psych:  Normal mood and affect.       I have reviewed ordered lab tests and independently visualized imaging below:    Recent Labs:  Recent Results (from the past 48 hour(s))   CBC    Collection Time: 12/06/22  3:31 PM   Result Value Ref Range    WBC 16.6 (H) 4.3 - 11.1 K/uL    RBC 2.96 (L) 4.05 - 5.2 M/uL    Hemoglobin 8.4 (L) 11.7 - 15.4 g/dL    Hematocrit 27.7 (L) 35.8 - 46.3 %    MCV 93.6 82 - 102 FL    MCH 28.4 26.1 - 32.9 PG    MCHC 30.3 (L) 31.4 - 35.0 g/dL    RDW 18.1 (H) 11.9 - 14.6 %    Platelets 298 967 - 441 K/uL    MPV 9.5 9.4 - 12.3 FL    nRBC 0.00 0.0 - 0.2 K/uL   Basic Metabolic Panel w/ Reflex to MG    Collection Time: 12/07/22  4:37 AM   Result Value Ref Range    Sodium 137 133 - 143 mmol/L    Potassium 3.1 (L) 3.5 - 5.1 mmol/L    Chloride 101 101 - 110 mmol/L    CO2 29 21 - 32 mmol/L    Anion Gap 7 2 - 11 mmol/L    Glucose 137 (H) 65 - 100 mg/dL    BUN 44 (H) 8 - 23 MG/DL    Creatinine 1.00 0.6 - 1.0 MG/DL    Est, Glom Filt Rate 55 (L) >60 ml/min/1.73m2    Calcium 9.7 8.3 - 10.4 MG/DL   CBC with Auto Differential    Collection Time: 12/07/22  4:37 AM   Result Value Ref Range    WBC 14.5 (H) 4.3 - 11.1 K/uL    RBC 2.93 (L) 4.05 - 5.2 M/uL    Hemoglobin 8.2 (L) 11.7 - 15.4 g/dL    Hematocrit 27.8 (L) 35.8 - 46.3 %    MCV 94.9 82 - 102 FL    MCH 28.0 26.1 - 32.9 PG    MCHC 29.5 (L) 31.4 - 35.0 g/dL    RDW 18.0 (H) 11.9 - 14.6 %    Platelets 878 542 - 688 K/uL    MPV 9.5 9.4 - 12.3 FL    nRBC 0.00 0.0 - 0.2 K/uL    Differential Type AUTOMATED      Seg Neutrophils 90 (H) 43 - 78 %    Lymphocytes 5 (L) 13 - 44 %    Monocytes 3 (L) 4.0 - 12.0 %    Eosinophils % 0 (L) 0.5 - 7.8 %    Basophils 0 0.0 - 2.0 %    Immature Granulocytes 1 0.0 - 5.0 %    Segs Absolute 13.1 (H) 1.7 - 8.2 K/UL    Absolute Lymph # 0.8 0.5 - 4.6 K/UL    Absolute Mono # 0.5 0.1 - 1.3 K/UL    Absolute Eos # 0.0 0.0 - 0.8 K/UL    Basophils Absolute 0.0 0.0 - 0.2 K/UL    Absolute Immature Granulocyte 0.2 0.0 - 0.5 K/UL   Magnesium    Collection Time: 12/07/22  4:37 AM   Result Value Ref Range    Magnesium 1.8 1.8 - 2.4 mg/dL   Rheumatoid Factor    Collection Time: 12/07/22  8:40 AM   Result Value Ref Range    Rheumatoid Factor Negative NEG     CBC with Auto Differential    Collection Time: 12/08/22  4:26 AM   Result Value Ref Range    WBC 12.6 (H) 4.3 - 11.1 K/uL    RBC 2.95 (L) 4.05 - 5.2 M/uL    Hemoglobin 8.3 (L) 11.7 - 15.4 g/dL    Hematocrit 26.7 (L) 35.8 - 46.3 %    MCV 90.5 82 - 102 FL    MCH 28.1 26.1 - 32.9 PG    MCHC 31.1 (L) 31.4 - 35.0 g/dL    RDW 17.7 (H) 11.9 - 14.6 %    Platelets 674 101 - 391 K/uL    MPV 9.3 (L) 9.4 - 12.3 FL    nRBC 0.00 0.0 - 0.2 K/uL    Differential Type AUTOMATED      Seg Neutrophils 90 (H) 43 - 78 %    Lymphocytes 5 (L) 13 - 44 %    Monocytes 3 (L) 4.0 - 12.0 %    Eosinophils % 0 (L) 0.5 - 7.8 %    Basophils 0 0.0 - 2.0 %    Immature Granulocytes 2 0.0 - 5.0 %    Segs Absolute 11.3 (H) 1.7 - 8.2 K/UL    Absolute Lymph # 0.6 0.5 - 4.6 K/UL    Absolute Mono # 0.3 0.1 - 1.3 K/UL    Absolute Eos # 0.0 0.0 - 0.8 K/UL    Basophils Absolute 0.0 0.0 - 0.2 K/UL    Absolute Immature Granulocyte 0.3 0.0 - 0.5 K/UL   Basic Metabolic Panel    Collection Time: 12/08/22  4:26 AM   Result Value Ref Range    Sodium 135 133 - 143 mmol/L    Potassium 3.1 (L) 3.5 - 5.1 mmol/L    Chloride 100 (L) 101 - 110 mmol/L    CO2 31 21 - 32 mmol/L    Anion Gap 4 2 - 11 mmol/L    Glucose 148 (H) 65 - 100 mg/dL    BUN 53 (H) 8 - 23 MG/DL    Creatinine 1.10 (H) 0.6 - 1.0 MG/DL    Est, Glom Filt Rate 49 (L) >60 ml/min/1.73m2    Calcium 9.7 8.3 - 10.4 MG/DL         Other Studies:  CT CHEST WO CONTRAST    Result Date: 12/5/2022  CT of the chest without contrast. CLINICAL INDICATION: Hypoxemia, respiratory failure, infiltrates PROCEDURE: Serial thin section axial images obtained from the thoracic inlet through the upper abdomen without the administration of intravenous contrast. Radiation dose reduction techniques were used for this study. Our CT scanners use one or all of the following: Automated exposure control, adjusted of the mA and/or kV according to patient size, iterative reconstruction COMPARISON: Chest x-ray dated 12/5/2022 FINDINGS: Shotty mediastinal lymph nodes are present. Small bilateral pleural effusions are present. There is a trace pericardial effusion. A pacer device is in place.  Confluent groundglass opacity is noted throughout the lower lobes with more peripheral patchy nodular densities in the subpleural lungs. More geographic groundglass patchy opacities noted in the bilateral upper lobes. Atypical pneumonia favored. Limited evaluation of the upper abdomen is unremarkable. No aggressive bone lesions identified. 1. Pan lobar, primarily groundglass opacities with more peripheral reticular nodular interstitial density with greater involvement of the lower lobes. This pattern is nonspecific; however, atypical viral pneumonia is favored. XR CHEST PORTABLE    Result Date: 12/5/2022  EXAMINATION: One view chest HISTORY: F/U pulmonary edema TECHNIQUE: Frontal chest. COMPARISON: 12/4/2022 FINDINGS:  Stable left-sided cardiac device. Persistent bilateral lung infiltrates. No significant interval change. There is no significant pneumothorax. There is no pleural effusion. The heart is unchanged. No other significant interval changes. 1. Findings as described above. Transthoracic echocardiogram (TTE) complete with contrast, bubble, strain, and 3D PRN    Result Date: 12/4/2022    Left Ventricle: Normal left ventricular systolic function with a visually estimated EF of 55 - 60%. Left ventricle size is normal. Normal wall thickness. Normal wall motion. Normal diastolic function. Technical qualifiers: Color flow Doppler was performed and pulse wave and/or continuous wave Doppler was performed.        Current Meds:  Current Facility-Administered Medications   Medication Dose Route Frequency    potassium chloride (KLOR-CON M) extended release tablet 40 mEq  40 mEq Oral PRN    Or    potassium bicarb-citric acid (EFFER-K) effervescent tablet 40 mEq  40 mEq Oral PRN    Or    potassium chloride 10 mEq/100 mL IVPB (Peripheral Line)  10 mEq IntraVENous PRN    methylPREDNISolone sodium (SOLU-MEDROL) injection 250 mg  250 mg IntraVENous Q6H    vancomycin (VANCOCIN) 1250 mg in sodium chloride 0.9% 250 mL IVPB  1,250 mg IntraVENous Q24H    benzonatate (TESSALON) capsule 100 mg  100 mg Oral TID PRN    lidocaine PF 4 % injection 5 mL  5 mL Inhalation Q6H PRN    furosemide (LASIX) injection 40 mg  40 mg IntraVENous BID    cefepime (MAXIPIME) 2,000 mg in sodium chloride 0.9 % 50 mL IVPB mini-bag  2,000 mg IntraVENous Q12H    magnesium sulfate 2000 mg in 50 mL IVPB premix  2,000 mg IntraVENous PRN    magnesium sulfate 2000 mg in 50 mL IVPB premix  2,000 mg IntraVENous Once    doxycycline (VIBRAMYCIN) 100 mg in sodium chloride 0.9 % 100 mL IVPB  100 mg IntraVENous Q12H    sodium chloride flush 0.9 % injection 5 mL  5 mL IntraVENous Q8H    sodium chloride flush 0.9 % injection 10 mL  10 mL IntraVENous PRN    amitriptyline (ELAVIL) tablet 25 mg  25 mg Oral Nightly    diclofenac sodium (VOLTAREN) 1 % gel 1 g  1 g Topical 4x Daily PRN    apixaban (ELIQUIS) tablet 5 mg  5 mg Oral BID    ferrous sulfate (IRON 325) tablet 325 mg  325 mg Oral Daily    flecainide (TAMBOCOR) tablet 75 mg  75 mg Oral BID    FLUoxetine (PROZAC) capsule 60 mg  60 mg Oral Daily    hydrOXYzine HCl (ATARAX) tablet 25 mg  25 mg Oral Nightly PRN    levothyroxine (SYNTHROID) tablet 50 mcg  50 mcg Oral QAM AC    metoprolol succinate (TOPROL XL) extended release tablet 50 mg  50 mg Oral Daily    pantoprazole (PROTONIX) tablet 40 mg  40 mg Oral QAM AC    timolol (TIMOPTIC) 0.5 % ophthalmic solution 1 drop  1 drop Both Eyes Daily    mometasone-formoterol (DULERA) 200-5 MCG/ACT inhaler 2 puff  2 puff Inhalation BID    LORazepam (ATIVAN) tablet 1 mg  1 mg Oral Q12H PRN    HYDROcodone-acetaminophen (NORCO) 7.5-325 MG per tablet 1 tablet  1 tablet Oral Q6H PRN    sennosides-docusate sodium (SENOKOT-S) 8.6-50 MG tablet 2 tablet  2 tablet Oral Nightly    albuterol (PROVENTIL) nebulizer solution 2.5 mg  2.5 mg Nebulization Q8H PRN    sodium chloride flush 0.9 % injection 5-40 mL  5-40 mL IntraVENous 2 times per day    sodium chloride flush 0.9 % injection 5-40 mL  5-40 mL IntraVENous PRN    0.9 % sodium chloride infusion   IntraVENous PRN    polyethylene glycol (GLYCOLAX) packet 17 g  17 g Oral Daily PRN    acetaminophen (TYLENOL) tablet 650 mg  650 mg Oral Q6H PRN    Or    acetaminophen (TYLENOL) suppository 650 mg  650 mg Rectal Q6H PRN       Signed:  Mateo Cornell MD    Part of this note may have been written by using a voice dictation software. The note has been proof read but may still contain some grammatical/other typographical errors.

## 2022-12-08 NOTE — PROGRESS NOTES
Interdisciplinary team rounds were held 12/8/2022 with the following team members:Care Management, Nursing, Nurse Practitioner, Pastoral Care, Pharmacy, Physical Therapy, Physician, Respiratory Therapy, and Clinical Coordinator and the patient. Plan of care discussed. See clinical pathway and/or care plan for interventions and desired outcomes.

## 2022-12-09 ENCOUNTER — APPOINTMENT (OUTPATIENT)
Dept: ULTRASOUND IMAGING | Age: 87
End: 2022-12-09
Payer: MEDICARE

## 2022-12-09 VITALS
OXYGEN SATURATION: 94 % | WEIGHT: 195.77 LBS | HEIGHT: 66 IN | BODY MASS INDEX: 31.46 KG/M2 | RESPIRATION RATE: 20 BRPM | SYSTOLIC BLOOD PRESSURE: 127 MMHG | HEART RATE: 60 BPM | TEMPERATURE: 98 F | DIASTOLIC BLOOD PRESSURE: 58 MMHG

## 2022-12-09 LAB
ANION GAP SERPL CALC-SCNC: 8 MMOL/L (ref 2–11)
BASOPHILS # BLD: 0 K/UL (ref 0–0.2)
BASOPHILS NFR BLD: 0 % (ref 0–2)
BUN SERPL-MCNC: 54 MG/DL (ref 8–23)
CALCIUM SERPL-MCNC: 9.9 MG/DL (ref 8.3–10.4)
CHLORIDE SERPL-SCNC: 100 MMOL/L (ref 101–110)
CO2 SERPL-SCNC: 31 MMOL/L (ref 21–32)
CREAT SERPL-MCNC: 1 MG/DL (ref 0.6–1)
DIFFERENTIAL METHOD BLD: ABNORMAL
EOSINOPHIL # BLD: 0 K/UL (ref 0–0.8)
EOSINOPHIL NFR BLD: 0 % (ref 0.5–7.8)
ERYTHROCYTE [DISTWIDTH] IN BLOOD BY AUTOMATED COUNT: 17.9 % (ref 11.9–14.6)
GLUCOSE SERPL-MCNC: 130 MG/DL (ref 65–100)
HCT VFR BLD AUTO: 29.1 % (ref 35.8–46.3)
HGB BLD-MCNC: 9.3 G/DL (ref 11.7–15.4)
IMM GRANULOCYTES # BLD AUTO: 0.5 K/UL (ref 0–0.5)
IMM GRANULOCYTES NFR BLD AUTO: 3 % (ref 0–5)
IRON SATN MFR SERPL: 25 %
IRON SERPL-MCNC: 47 UG/DL (ref 35–150)
LYMPHOCYTES # BLD: 0.9 K/UL (ref 0.5–4.6)
LYMPHOCYTES NFR BLD: 6 % (ref 13–44)
MCH RBC QN AUTO: 28.4 PG (ref 26.1–32.9)
MCHC RBC AUTO-ENTMCNC: 32 G/DL (ref 31.4–35)
MCV RBC AUTO: 88.7 FL (ref 82–102)
MONOCYTES # BLD: 0.5 K/UL (ref 0.1–1.3)
MONOCYTES NFR BLD: 4 % (ref 4–12)
NEUTS SEG # BLD: 13.4 K/UL (ref 1.7–8.2)
NEUTS SEG NFR BLD: 87 % (ref 43–78)
NRBC # BLD: 0 K/UL (ref 0–0.2)
PATH REV BLD -IMP: NORMAL
PLATELET # BLD AUTO: 355 K/UL (ref 150–450)
PMV BLD AUTO: 10.4 FL (ref 9.4–12.3)
POTASSIUM SERPL-SCNC: 3.5 MMOL/L (ref 3.5–5.1)
RBC # BLD AUTO: 3.28 M/UL (ref 4.05–5.2)
SODIUM SERPL-SCNC: 139 MMOL/L (ref 133–143)
TIBC SERPL-MCNC: 185 UG/DL (ref 250–450)
WBC # BLD AUTO: 15.3 K/UL (ref 4.3–11.1)

## 2022-12-09 PROCEDURE — 2580000003 HC RX 258: Performed by: FAMILY MEDICINE

## 2022-12-09 PROCEDURE — 6360000002 HC RX W HCPCS: Performed by: INTERNAL MEDICINE

## 2022-12-09 PROCEDURE — 6370000000 HC RX 637 (ALT 250 FOR IP): Performed by: FAMILY MEDICINE

## 2022-12-09 PROCEDURE — 6370000000 HC RX 637 (ALT 250 FOR IP): Performed by: STUDENT IN AN ORGANIZED HEALTH CARE EDUCATION/TRAINING PROGRAM

## 2022-12-09 PROCEDURE — 99233 SBSQ HOSP IP/OBS HIGH 50: CPT | Performed by: INTERNAL MEDICINE

## 2022-12-09 PROCEDURE — 6360000002 HC RX W HCPCS: Performed by: STUDENT IN AN ORGANIZED HEALTH CARE EDUCATION/TRAINING PROGRAM

## 2022-12-09 PROCEDURE — 2700000000 HC OXYGEN THERAPY PER DAY

## 2022-12-09 PROCEDURE — 6370000000 HC RX 637 (ALT 250 FOR IP): Performed by: INTERNAL MEDICINE

## 2022-12-09 PROCEDURE — 36415 COLL VENOUS BLD VENIPUNCTURE: CPT

## 2022-12-09 PROCEDURE — 2580000003 HC RX 258: Performed by: INTERNAL MEDICINE

## 2022-12-09 PROCEDURE — 2580000003 HC RX 258: Performed by: EMERGENCY MEDICINE

## 2022-12-09 PROCEDURE — 80048 BASIC METABOLIC PNL TOTAL CA: CPT

## 2022-12-09 PROCEDURE — 6370000000 HC RX 637 (ALT 250 FOR IP)

## 2022-12-09 PROCEDURE — 83550 IRON BINDING TEST: CPT

## 2022-12-09 PROCEDURE — 85025 COMPLETE CBC W/AUTO DIFF WBC: CPT

## 2022-12-09 PROCEDURE — 2580000003 HC RX 258: Performed by: STUDENT IN AN ORGANIZED HEALTH CARE EDUCATION/TRAINING PROGRAM

## 2022-12-09 PROCEDURE — 94640 AIRWAY INHALATION TREATMENT: CPT

## 2022-12-09 PROCEDURE — 93971 EXTREMITY STUDY: CPT

## 2022-12-09 RX ORDER — GUAIFENESIN 600 MG/1
1200 TABLET, EXTENDED RELEASE ORAL 2 TIMES DAILY
Qty: 28 TABLET | Refills: 0 | Status: ON HOLD
Start: 2022-12-09 | End: 2022-12-16

## 2022-12-09 RX ORDER — METHYLPREDNISOLONE SODIUM SUCCINATE 125 MG/2ML
60 INJECTION, POWDER, LYOPHILIZED, FOR SOLUTION INTRAMUSCULAR; INTRAVENOUS EVERY 8 HOURS
Qty: 6 EACH | Refills: 0 | Status: ON HOLD
Start: 2022-12-09 | End: 2022-12-13

## 2022-12-09 RX ORDER — ENOXAPARIN SODIUM 100 MG/ML
1 INJECTION SUBCUTANEOUS 2 TIMES DAILY
Status: DISCONTINUED | OUTPATIENT
Start: 2022-12-09 | End: 2022-12-09 | Stop reason: HOSPADM

## 2022-12-09 RX ORDER — BENZONATATE 100 MG/1
100 CAPSULE ORAL 3 TIMES DAILY PRN
Qty: 21 CAPSULE | Refills: 0 | Status: ON HOLD | OUTPATIENT
Start: 2022-12-09 | End: 2022-12-16

## 2022-12-09 RX ORDER — FUROSEMIDE 10 MG/ML
40 INJECTION INTRAMUSCULAR; INTRAVENOUS DAILY
Qty: 4 ML | Refills: 0 | Status: ON HOLD
Start: 2022-12-09

## 2022-12-09 RX ORDER — HYDROCODONE BITARTRATE AND ACETAMINOPHEN 7.5; 325 MG/1; MG/1
1 TABLET ORAL EVERY 6 HOURS PRN
Qty: 12 TABLET | Refills: 0 | Status: ON HOLD
Start: 2022-12-09 | End: 2022-12-12

## 2022-12-09 RX ORDER — ALBUTEROL SULFATE 2.5 MG/3ML
2.5 SOLUTION RESPIRATORY (INHALATION) EVERY 8 HOURS PRN
Qty: 120 EACH | Refills: 3 | Status: ON HOLD | OUTPATIENT
Start: 2022-12-09

## 2022-12-09 RX ORDER — METHYLPREDNISOLONE SODIUM SUCCINATE 125 MG/2ML
60 INJECTION, POWDER, LYOPHILIZED, FOR SOLUTION INTRAMUSCULAR; INTRAVENOUS EVERY 8 HOURS
Status: DISCONTINUED | OUTPATIENT
Start: 2022-12-09 | End: 2022-12-09 | Stop reason: HOSPADM

## 2022-12-09 RX ORDER — POTASSIUM CHLORIDE 20 MEQ/1
40 TABLET, EXTENDED RELEASE ORAL 2 TIMES DAILY WITH MEALS
Status: DISCONTINUED | OUTPATIENT
Start: 2022-12-09 | End: 2022-12-09 | Stop reason: HOSPADM

## 2022-12-09 RX ORDER — POTASSIUM CHLORIDE 20 MEQ/1
40 TABLET, EXTENDED RELEASE ORAL 2 TIMES DAILY WITH MEALS
Qty: 4 TABLET | Refills: 0 | Status: ON HOLD
Start: 2022-12-09 | End: 2022-12-10

## 2022-12-09 RX ADMIN — POTASSIUM BICARBONATE 40 MEQ: 782 TABLET, EFFERVESCENT ORAL at 17:11

## 2022-12-09 RX ADMIN — SODIUM CHLORIDE SOLN NEBU 3% 4 ML: 3 NEBU SOLN at 06:58

## 2022-12-09 RX ADMIN — SODIUM CHLORIDE, PRESERVATIVE FREE 5 ML: 5 INJECTION INTRAVENOUS at 14:58

## 2022-12-09 RX ADMIN — MOMETASONE FUROATE AND FORMOTEROL FUMARATE DIHYDRATE 2 PUFF: 200; 5 AEROSOL RESPIRATORY (INHALATION) at 06:58

## 2022-12-09 RX ADMIN — METHYLPREDNISOLONE SODIUM SUCCINATE 60 MG: 125 INJECTION, POWDER, FOR SOLUTION INTRAMUSCULAR; INTRAVENOUS at 05:52

## 2022-12-09 RX ADMIN — MOMETASONE FUROATE AND FORMOTEROL FUMARATE DIHYDRATE 2 PUFF: 200; 5 AEROSOL RESPIRATORY (INHALATION) at 19:12

## 2022-12-09 RX ADMIN — HYDROCODONE BITARTRATE AND ACETAMINOPHEN 1 TABLET: 7.5; 325 TABLET ORAL at 08:27

## 2022-12-09 RX ADMIN — PANTOPRAZOLE SODIUM 40 MG: 40 TABLET, DELAYED RELEASE ORAL at 05:52

## 2022-12-09 RX ADMIN — SODIUM CHLORIDE, PRESERVATIVE FREE 5 ML: 5 INJECTION INTRAVENOUS at 08:24

## 2022-12-09 RX ADMIN — FERROUS SULFATE TAB 325 MG (65 MG ELEMENTAL FE) 325 MG: 325 (65 FE) TAB at 08:23

## 2022-12-09 RX ADMIN — METHYLPREDNISOLONE SODIUM SUCCINATE 60 MG: 125 INJECTION, POWDER, FOR SOLUTION INTRAMUSCULAR; INTRAVENOUS at 14:26

## 2022-12-09 RX ADMIN — GUAIFENESIN 1200 MG: 600 TABLET ORAL at 08:23

## 2022-12-09 RX ADMIN — APIXABAN 5 MG: 5 TABLET, FILM COATED ORAL at 08:24

## 2022-12-09 RX ADMIN — FLECAINIDE ACETATE 75 MG: 50 TABLET ORAL at 08:23

## 2022-12-09 RX ADMIN — FLUOXETINE 60 MG: 20 CAPSULE ORAL at 08:24

## 2022-12-09 RX ADMIN — SODIUM CHLORIDE, PRESERVATIVE FREE 10 ML: 5 INJECTION INTRAVENOUS at 08:25

## 2022-12-09 RX ADMIN — HYDROCODONE BITARTRATE AND ACETAMINOPHEN 1 TABLET: 7.5; 325 TABLET ORAL at 15:04

## 2022-12-09 RX ADMIN — FUROSEMIDE 40 MG: 10 INJECTION, SOLUTION INTRAMUSCULAR; INTRAVENOUS at 08:23

## 2022-12-09 RX ADMIN — POTASSIUM CHLORIDE 40 MEQ: 1500 TABLET, EXTENDED RELEASE ORAL at 08:23

## 2022-12-09 RX ADMIN — SODIUM CHLORIDE SOLN NEBU 3% 4 ML: 3 NEBU SOLN at 19:12

## 2022-12-09 RX ADMIN — CEFEPIME 2000 MG: 2 INJECTION, POWDER, FOR SOLUTION INTRAVENOUS at 10:48

## 2022-12-09 RX ADMIN — LEVOTHYROXINE SODIUM 50 MCG: 0.05 TABLET ORAL at 05:52

## 2022-12-09 RX ADMIN — METOPROLOL SUCCINATE 50 MG: 50 TABLET, EXTENDED RELEASE ORAL at 08:23

## 2022-12-09 RX ADMIN — TIMOLOL MALEATE 1 DROP: 5 SOLUTION OPHTHALMIC at 08:22

## 2022-12-09 ASSESSMENT — PAIN SCALES - GENERAL
PAINLEVEL_OUTOF10: 6
PAINLEVEL_OUTOF10: 6
PAINLEVEL_OUTOF10: 0
PAINLEVEL_OUTOF10: 6

## 2022-12-09 NOTE — DISCHARGE SUMMARY
Hospitalist Discharge Summary   Admit Date:  12/3/2022  2:56 PM   DC Note date: 2022  Name:  Joseph Live   Age:  82 y.o. Sex:  female  :  1935   MRN:  [id number]   Room:  [address] [place] [address]  PCP:  Guzman Deleon DO    Presenting Complaint: Shortness of Breath (Recent CHF diagnosis )     Initial Admission Diagnosis: Acute respiratory failure ([address] [J96.00]  Acute congestive heart failure, unspecified heart failure type ([facility] [I50.9]     Problem List for this Hospitalization (present on admission):    Principal Problem:    Acute respiratory failure ([address]  Active Problems:    Pacemaker    Anemia, unspecified    Hypokalemia    Pulmonary infiltrates    Acute congestive heart failure ([address]    Anticoagulant long-term use    Paroxysmal atrial fibrillation (HCC)    HTN (hypertension), benign    Long term (current) use of anticoagulants    Bradycardia  Resolved Problems:    * No resolved hospital problems. *      Hospital Course:  Reason(s) for Admission: Acute respiratory failure (HCC) [J96.00]  Acute congestive heart failure, unspecified heart failure type [facility]) [I50.9]      Hospital Course & Interval History:   Mrs. George Cole is an 83 y/o female with a h/o atrial fibrillation, hypothyroidism, bradycardia with PPM, fibromyalgia and GERD who was admitted to our service on 12/3 with acute hypoxemic respiratory failure felt due to atypical pneumonia vs pulmonary edema. She had a normal TTE In 2022. She was started on CPAP, IV Lasix and a nitroglycerin drip and admitted to ICU. Cardiology and Pulmonology consulted. RVP negative. Doxycycline added on . Weaned of nitro drip . She had minimal improvement with IV diuretics and CXR was unchanged so Lasix was discontinued on . CT chest  showed pan-lobar GGOs, cefepime and IV steroids were added. She did not have any significant improvement with antibiotics or Lasix.   It was felt that possibly findings are inflammatory versus infectious in nature. High-dose steroids seem to make a significant improvement in her breathing. She was maintained in the ICU secondary to her high O2 requirements with high risk for intubation. Her O2 requirements gradually fell and she is being stable to leave the intensive care unit today. [facility] pulmonology sent off rheumatological work-up that came up with DEVORAH positive as well as anti-SCL-70  -Rheumatology has been consulted. Patient was also noted to have a swollen left forearm- Doppler ultrasound-results show a nonocclusive thrombus in the left distal subclavian.-Hematology consulted as patient has been on anticoagulation with Eliquis 5 mg p.o. twice daily. With no missed dosages in the hospital.  No history of any central line placement. Did have 2 peripheral left antecubital fossa IVs.  Eliquis held and transitioned to Lovenox until she can be seen by hematology. She was also started on iron for iron deficiency anemia. Peripheral smear pending. Subjective/24hr Events (12/08/22): Patient seen and evaluated. On Airvo. Tolerating CPAP at night. O2 requirements have decreased significantly on Airvo  And she is stable to transfer out of the ICU. Coolness; with color changes to both lower extremities have improved. However she notes some distal markings on the plantar aspect of the distal phalanxes bilaterally. As well as a marking on the nailbed on the left foot of 1 toe. There nonpalpable. Patient and her family thinks they have just occurred here in the hospital.  However, I am not convinced that these are acute lesions- and we discussed that she may not have paid as much attention to her hands and feet at home compared to during her current hospital stay. DEVORAH and antis SCL 70 positive-rheumatology consulted  Afebrile overnight  Denies chest pain nausea vomiting or chills  Family at bedside all questions answered  She is accepted to [facility] and will be discharging there this evening. medications renal function  Avoid nephrotoxins      Lesion to the nailbed of the left foot and some discolorations on the distal phalanx of the palmar aspect of both fingers  These do not appear to be new nor acute  However will have rheumatology follow-up     Disposition: [facility]  Diet: ADULT DIET; Easy to Chew; Low Sodium (2 gm)  Code Status: Full Code    Follow Ups:  [facility]  Cardiology  Pulmonology  Hematology  Rheumatology    Time spent in patient discharge and coordination 45 minutes. Follow up labs/diagnostics (ultimately defer to follow-up provider):      Plan was discussed with the IDT. All questions answered. Patient was stable at time of discharge. Instructions given to call a physician or return if any concerns. Current Discharge Medication List        START taking these medications    Details   HYDROcodone-acetaminophen (NORCO) 7.5-325 MG per tablet Take 1 tablet by mouth every 6 hours as needed for Pain for up to 3 days. Qty: 12 tablet, Refills: 0    Comments: Reduce doses taken as pain becomes manageable  Associated Diagnoses: Other chronic pain      albuterol (PROVENTIL) (2.5 MG/3ML) 0.083% nebulizer solution Take 3 mLs by nebulization every 8 hours as needed for Wheezing  Qty: 120 each, Refills: 3      mometasone-formoterol (DULERA) 200-5 MCG/ACT inhaler Inhale 2 puffs into the lungs in the morning and 2 puffs in the evening. Do all this for 14 days. Qty: 14 each, Refills: 0      cefepime (MAXIPIME) infusion Infuse 2,000 mg intravenously in the morning and 2,000 mg in the evening. Do all this for 4 doses. Compound per protocol. Qty: 8 g, Refills: 0      methylPREDNISolone sodium (SOLU-MEDROL) 125 MG injection Infuse 0.96 mLs intravenously in the morning and 0.96 mLs at noon and 0.96 mLs in the evening. Do all this for 4 days.   Qty: 6 each, Refills: 0      benzonatate (TESSALON) 100 MG capsule Take 1 capsule by mouth 3 times daily as needed for Cough  Qty: 21 capsule, Refills: 0 timolol (TIMOPTIC) 0.5 % ophthalmic solution Apply 1 drop to eye daily      Vitamin A 2400 MCG (8000 UT) TABS Take by mouth daily       ! ! - Potential duplicate medications found. Please discuss with provider. STOP taking these medications       furosemide (LASIX) 40 MG tablet Comments:   Reason for Stopping:         acetaminophen (TYLENOL) 325 MG tablet Comments:   Reason for Stopping:         levalbuterol (XOPENEX) 1.25 MG/3ML nebulizer solution Comments:   Reason for Stopping:         POTASSIUM PO Comments:   Reason for Stopping:         loratadine (CLARITIN) 10 MG tablet Comments:   Reason for Stopping:         triamterene-hydroCHLOROthiazide (MAXZIDE) 75-50 MG per tablet Comments:   Reason for Stopping:         amLODIPine (NORVASC) 2.5 MG tablet Comments:   Reason for Stopping:         HYDROcodone-acetaminophen (Kristen Courser)  MG per tablet Comments:   Reason for Stopping:         lisinopril (PRINIVIL;ZESTRIL) 20 MG tablet Comments:   Reason for Stopping:         potassium chloride (MICRO-K) 10 MEQ extended release capsule Comments:   Reason for Stopping:               Procedures done this admission:  * No surgery found *    Consults this admission:  IP CONSULT TO CARDIOLOGY  IP CONSULT TO PULMONOLOGY  IP CONSULT TO PHARMACY    Echocardiogram results:  12/03/22    TRANSTHORACIC ECHOCARDIOGRAM (TTE) COMPLETE (CONTRAST/BUBBLE/3D PRN) 12/04/2022  1:05 PM (Final)    Interpretation Summary    Left Ventricle: Normal left ventricular systolic function with a visually estimated EF of 55 - 60%. Left ventricle size is normal. Normal wall thickness. Normal wall motion. Normal diastolic function. Technical qualifiers: Color flow Doppler was performed and pulse wave and/or continuous wave Doppler was performed. Signed by: Hayden Ibarra MD on 12/4/2022  1:05 PM      Diagnostic Imaging/Tests:   CT CHEST WO CONTRAST    Result Date: 12/5/2022  1.  Pan lobar, primarily groundglass opacities with more peripheral reticular nodular interstitial density with greater involvement of the lower lobes. This pattern is nonspecific; however, atypical viral pneumonia is favored. XR CHEST PORTABLE    Result Date: 12/7/2022  Little interval change in hazy bilateral airspace disease. XR CHEST PORTABLE    Result Date: 12/5/2022  1. Findings as described above. XR CHEST PORTABLE    Result Date: 12/4/2022  -Similar-appearing bilateral pulmonary opacities, which may reflect pulmonary edema or pneumonia. XR CHEST PORTABLE    Result Date: 12/3/2022  Worsening bilateral pulmonary edema or atypical pneumonia pattern. XR CHEST PORTABLE    Result Date: 11/17/2022  Findings as above. Vascular duplex upper extremity venous left    Result Date: 12/9/2022  1. Nonocclusive thrombus in the distal left subclavian vein. This result was called to the patient's nurse at 10:20 AM on 12/9/2022 by the ultrasound technologist.        Labs: Results:       BMP, Mg, Phos Recent Labs     12/07/22 0437 12/08/22  0426 12/09/22  0709    135 139   K 3.1* 3.1* 3.5    100* 100*   CO2 29 31 31   ANIONGAP 7 4 8   BUN 44* 53* 54*   CREATININE 1.00 1.10* 1.00   LABGLOM 55* 49* 55*   CALCIUM 9.7 9.7 9.9   GLUCOSE 137* 148* 130*   MG 1.8  --   --       CBC Recent Labs     12/07/22 0437 12/08/22  0426 12/09/22  0709   WBC 14.5* 12.6* 15.3*   RBC 2.93* 2.95* 3.28*   HGB 8.2* 8.3* 9.3*   HCT 27.8* 26.7* 29.1*   MCV 94.9 90.5 88.7   MCH 28.0 28.1 28.4   MCHC 29.5* 31.1* 32.0   RDW 18.0* 17.7* 17.9*    247 355   MPV 9.5 9.3* 10.4   NRBC 0.00 0.00 0.00   SEGS 90* 90* 87*   LYMPHOPCT 5* 5* 6*   EOSRELPCT 0* 0* 0*   MONOPCT 3* 3* 4   BASOPCT 0 0 0   IMMGRAN 1 2 3   SEGSABS 13.1* 11.3* 13.4*   LYMPHSABS 0.8 0.6 0.9   EOSABS 0.0 0.0 0.0   MONOSABS 0.5 0.3 0.5   BASOSABS 0.0 0.0 0.0   ABSIMMGRAN 0.2 0.3 0.5      LFT No results for input(s): BILITOT, BILIDIR, ALKPHOS, AST, ALT, PROT, LABALBU, GLOB in the last 72 hours.    Cardiac  Lab Results   Component Value Date/Time    NTPROBNP 2,755 12/04/2022 03:46 AM    NTPROBNP 2,517 12/03/2022 03:06 PM    TROPHS 16.3 12/03/2022 05:16 PM    TROPHS 14.6 12/03/2022 03:06 PM    TROPHS 31.1 11/17/2022 02:56 AM      Coags Lab Results   Component Value Date/Time    PROTIME 13.5 11/26/2019 09:47 AM    INR 1.0 11/26/2019 09:47 AM      A1c No results found for: LABA1C, EAG   Lipids No results found for: CHOL, LDLCALC, LABVLDL, HDL, CHOLHDLRATIO, TRIG   Thyroid  Lab Results   Component Value Date/Time    TSHELE 1.64 11/17/2022 12:26 AM        Most Recent UA Lab Results   Component Value Date/Time    COLORU YELLOW/STRAW 11/17/2022 12:26 AM    APPEARANCE CLOUDY 11/17/2022 12:26 AM    SPECGRAV 1.005 11/17/2022 12:26 AM    LABPH 6.5 11/17/2022 12:26 AM    PROTEINU Negative 11/17/2022 12:26 AM    GLUCOSEU Negative 11/17/2022 12:26 AM    KETUA Negative 11/17/2022 12:26 AM    BILIRUBINUR Negative 11/17/2022 12:26 AM    BLOODU Negative 11/17/2022 12:26 AM    UROBILINOGEN 0.2 11/17/2022 12:26 AM    NITRU Negative 11/17/2022 12:26 AM    LEUKOCYTESUR Negative 11/17/2022 12:26 AM        Recent Labs     12/05/22  0443 12/05/22  0312   CULTURE SA target not detected. A MRSA NEGATIVE, SA NEGATIVE test result does not preclude MRSA or SA nasal colonization.  NO GROWTH 4 DAYS       All Labs from Last 24 Hrs:  Recent Results (from the past 24 hour(s))   CBC with Auto Differential    Collection Time: 12/09/22  7:09 AM   Result Value Ref Range    WBC 15.3 (H) 4.3 - 11.1 K/uL    RBC 3.28 (L) 4.05 - 5.2 M/uL    Hemoglobin 9.3 (L) 11.7 - 15.4 g/dL    Hematocrit 29.1 (L) 35.8 - 46.3 %    MCV 88.7 82 - 102 FL    MCH 28.4 26.1 - 32.9 PG    MCHC 32.0 31.4 - 35.0 g/dL    RDW 17.9 (H) 11.9 - 14.6 %    Platelets 579 336 - 173 K/uL    MPV 10.4 9.4 - 12.3 FL    nRBC 0.00 0.0 - 0.2 K/uL    Differential Type AUTOMATED      Seg Neutrophils 87 (H) 43 - 78 %    Lymphocytes 6 (L) 13 - 44 %    Monocytes 4 4.0 - 12.0 % Eosinophils % 0 (L) 0.5 - 7.8 %    Basophils 0 0.0 - 2.0 %    Immature Granulocytes 3 0.0 - 5.0 %    Segs Absolute 13.4 (H) 1.7 - 8.2 K/UL    Absolute Lymph # 0.9 0.5 - 4.6 K/UL    Absolute Mono # 0.5 0.1 - 1.3 K/UL    Absolute Eos # 0.0 0.0 - 0.8 K/UL    Basophils Absolute 0.0 0.0 - 0.2 K/UL    Absolute Immature Granulocyte 0.5 0.0 - 0.5 K/UL   Basic Metabolic Panel    Collection Time: 12/09/22  7:09 AM   Result Value Ref Range    Sodium 139 133 - 143 mmol/L    Potassium 3.5 3.5 - 5.1 mmol/L    Chloride 100 (L) 101 - 110 mmol/L    CO2 31 21 - 32 mmol/L    Anion Gap 8 2 - 11 mmol/L    Glucose 130 (H) 65 - 100 mg/dL    BUN 54 (H) 8 - 23 MG/DL    Creatinine 1.00 0.6 - 1.0 MG/DL    Est, Glom Filt Rate 55 (L) >60 ml/min/1.73m2    Calcium 9.9 8.3 - 10.4 MG/DL   Transferrin Saturation    Collection Time: 12/09/22  7:09 AM   Result Value Ref Range    Iron 47 35 - 150 ug/dL    TIBC 185 (L) 250 - 450 ug/dL    TRANSFERRIN SATURATION 25 >20 %       Allergies   Allergen Reactions    Aloe Vera Other (See Comments)    Metoclopramide Other (See Comments)    Morphine Other (See Comments)    Penicillins Other (See Comments)    Sulfa Antibiotics Other (See Comments)    Sulfamethoxazole-Trimethoprim Other (See Comments)    Adhesive Tape Rash     Immunization History   Administered Date(s) Administered    COVID-19, PFIZER GRAY top, DO NOT Dilute, (age 15 y+), IM, 30 mcg/0.3 mL 04/06/2022    COVID-19, PFIZER PURPLE top, DILUTE for use, (age 15 y+), 30mcg/0.3mL 02/17/2021, 09/30/2021       Recent Vital Data:  Patient Vitals for the past 24 hrs:   Temp Pulse Resp BP SpO2   12/09/22 1519 -- 60 21 -- 95 %   12/09/22 1400 -- 60 22 132/60 97 %   12/09/22 1300 -- 60 21 136/62 96 %   12/09/22 1230 -- 72 (!) 95 -- 92 %   12/09/22 1215 -- (!) 110 (!) 41 -- (!) 85 %   12/09/22 1200 97.8 °F (36.6 °C) 60 20 (!) 154/70 96 %   12/09/22 1145 -- 60 19 -- 97 %   12/09/22 1130 -- 60 20 -- 95 %   12/09/22 1115 -- 65 20 -- 98 %   12/09/22 1100 -- 60 21 133/63 96 %   12/09/22 1045 -- 67 (!) 33 -- 93 %   12/09/22 1030 -- 60 21 -- 92 %   12/09/22 1015 -- 60 20 -- 97 %   12/09/22 1000 -- 60 22 (!) 147/65 96 %   12/09/22 0945 -- 60 22 -- 96 %   12/09/22 0930 -- 60 (!) 32 -- 96 %   12/09/22 0915 -- 60 22 -- 95 %   12/09/22 0900 -- 61 18 (!) 157/67 97 %   12/09/22 0845 -- 60 (!) 31 -- 91 %   12/09/22 0830 -- 62 (!) 37 -- 95 %   12/09/22 0815 -- 67 24 -- 96 %   12/09/22 0800 97.6 °F (36.4 °C) 66 21 (!) 147/65 93 %   12/09/22 0745 -- 66 23 -- 97 %   12/09/22 0730 -- 62 20 -- 94 %   12/09/22 0715 -- 64 21 -- 99 %   12/09/22 0700 -- 63 (!) 53 (!) 167/77 (!) 86 %   12/09/22 0659 -- 64 30 -- 91 %   12/09/22 0658 -- 62 25 -- 91 %   12/09/22 0600 -- 63 24 133/62 91 %   12/09/22 0500 -- 60 20 132/61 97 %   12/09/22 0421 -- -- 20 -- --   12/09/22 0400 98.3 °F (36.8 °C) 65 29 (!) 150/68 97 %   12/09/22 0300 -- 62 -- (!) 152/69 98 %   12/09/22 0200 -- 60 29 (!) 150/67 98 %   12/09/22 0100 -- 63 -- (!) 142/59 98 %   12/09/22 0000 -- 64 26 131/62 98 %   12/08/22 2346 -- -- (!) 31 -- --   12/08/22 2303 97.9 °F (36.6 °C) 60 30 (!) 151/71 100 %   12/08/22 2227 -- 60 27 (!) 141/69 94 %   12/08/22 2100 -- 60 25 (!) 144/64 92 %   12/08/22 2010 -- 60 24 -- 91 %   12/08/22 2000 -- 60 23 124/60 92 %   12/08/22 1905 -- -- 25 -- --   12/08/22 1904 97.8 °F (36.6 °C) 65 22 138/63 92 %   12/08/22 1800 -- 60 21 133/63 (!) 89 %   12/08/22 1700 -- 61 18 (!) 172/73 90 %   12/08/22 1600 -- 60 29 (!) 147/70 90 %   12/08/22 1542 -- 60 24 -- 93 %       Oxygen Therapy  SpO2: 95 %  Pulse Oximetry Type: Continuous  Pulse via Oximetry: 60 beats per minute  Pulse Oximeter Device Mode: Continuous  Pulse Oximeter Device Location: Left, Finger  O2 Device: High flow nasal cannula  Oximetry Probe Site Changed: No  Skin Assessment: Clean, dry, & intact  Skin Protection for O2 Device: No  FiO2 : 75 %  O2 Flow Rate (L/min): 60 L/min  Blood Gas  Performed?: No  Thomas's Test #1: Collateral flow confirmed  Site #1: Left Radial  Site Prepped #1: Yes  Number of Attempts #1: 1  Pressure Held #1: Yes  Complications #1: None  Post-procedure #1: Standard  How Tolerated?: Tolerated well  Oxygen Therapy: Supplemental oxygen  O2 Delivery Method: High flow nasal cannula    Estimated body mass index is 31.6 kg/m² as calculated from the following:    Height as of this encounter: 5' 6\" (1.676 m). Weight as of this encounter: 195 lb 12.3 oz (88.8 kg). Intake/Output Summary (Last 24 hours) at 12/9/2022 1534  Last data filed at 12/9/2022 1300  Gross per 24 hour   Intake 570 ml   Output 1200 ml   Net -630 ml         Physical Exam:  General:          Well nourished. Awake, oriented x3, SOB at rest.  BMI of 31.60  Head:               Normocephalic, atraumatic  Eyes:               Sclerae appear normal. Pupils equally round. ENT:                Nares appear normal, no drainage. Dry oral mucosa  Neck:               No restricted ROM. Trachea midline. CV:                  RRR. No m/r/g. No jugular venous distension. Lungs:             Bilateral rales, no wheezes or rhonchi. Airvo 60L/75% bedside sats high 90s at rest. Conversational dyspnea but much improved   abdomen: Bowel sounds present. Soft, nontender, nondistended. Extremities:     No cyanosis or clubbing. No edema. Skin:                No rashes and normal coloration. Warm and dry. Some tiny discolorations to the palmar aspect of both hands and one toe of the left foot-do not appear acute   neuro:             CN II-XII grossly intact. Sensation intact. A&Ox3  Psych:             Normal mood and affect. Signed:  Malachi Vieyra MD    Part of this note may have been written by using a voice dictation software. The note has been proof read but may still contain some grammatical/other typographical errors.

## 2022-12-09 NOTE — INTERDISCIPLINARY ROUNDS
Multi-D Rounds/Checklist (leapfrog):  Lines: can any be removed?: None     External Urinary Catheter (Active)      DVT Prophylaxis: Ordered  Vent: N/A Airvo  Nutrition Ordered/appropriate: Ordered  Can antibiotics or other drugs be stopped? Is Nozin performed: Yes/End Date set  Consults needed: None  A: Is pain control adequate? (has PRNs? Stop drip?) Yes  B: Sedation break and SBT? N/A  C: Is sedation choice appropriate? N/A  D: Delirium/CAM-ICU? No  E: Mobility goals/appropriateness? Yes  F: Family update and plan? Daughter is primary contact and is being updated daily by primary attending and nursing staff.     MALGORZATA Sol - NP

## 2022-12-09 NOTE — PROGRESS NOTES
A follow up visit was made to the patient. Emotional support, spiritual presence and   prayer were provided for the patient. The patient says that she is doing well. Her  and son are at the bedside.  The patient and her family are faithful believers in [address], [address] [place] [address]

## 2022-12-09 NOTE — CARE COORDINATION
Chart reviewed as pt continues in ICU, but with tx orders. Continues on Airvo 75% -60 L currently. LA US of arm for edema per MD notes. IV abx currently. Discussed with MD's regarding referral to [facility] for poss longer hospitalization with O2 needs. Agree with referral. Nuha Luna, liaison, notified. Pt has MCR. CM following. If accepted and offered, will discuss with pt and family.

## 2022-12-09 NOTE — PROGRESS NOTES
[facility] CARDIOLOGY PROGRESS NOTE           12/9/2022 7:29 AM    Admit Date: 12/3/2022      Subjective:   Patient remains in ICU all OptiFlow therapy. No significant edema in lower extremities but does note edematous left arm. Ultrasound ordered today per nursing staff. Limited diuresis over the last 24 hours with approximately 500 cc out. Labs show mildly worsening renal insufficiency in a azotemic pattern. Remains in sinus rhythm. ROS:  GEN:  No fever or chills  Cardiovascular:  As noted above  Pulmonary:  As noted above  Neuro:  No new focal motor or sensory loss    Objective:      Vitals:    12/09/22 0500 12/09/22 0600 12/09/22 0658 12/09/22 0659   BP: 132/61 133/62     Pulse: 60 63 62 64   Resp: 20 24 25 30   Temp:       TempSrc:       SpO2: 97% 91% 91% 91%   Weight:       Height:           Physical Exam:  General-Elderly WF in NAD  Neck- supple, no JVD  CV- regular rate and rhythm no MRG  Lung- clear bilaterally  Abd- soft, nontender, nondistended  Ext- no edema bilaterally. Skin- warm and dry  Psychiatric:  Normal mood and affect. Neurologic:  Alert and oriented X 3      Data Review:     Recent Labs     12/08/22  0426 12/07/22  0437 12/06/22  1531   WBC 12.6* 14.5* 16.6*   HGB 8.3* 8.2* 8.4*   HCT 26.7* 27.8* 27.7*   MCV 90.5 94.9 93.6    234 292       Recent Labs     12/08/22  0426 12/04/22  1343 12/04/22  0346      < > 139   K 3.1*   < > 3.0*   *   < > 99*   CO2 31   < > 35*   BUN 53*   < > 17   CREATININE 1.10*   < > 0.90   GLUCOSE 148*   < > 102*   CALCIUM 9.7   < > 9.2   PROT  --   --  6.1*   LABALBU  --   --  2.5*   BILITOT  --   --  1.2*   ALKPHOS  --   --  133   AST  --   --  41*   ALT  --   --  26   LABGLOM 49*   < > >60   GLOB  --   --  3.6    < > = values in this interval not displayed. No results for input(s): CKTOTAL, CKMB, CKMBINDEX, DDIMER, TROPONINI in the last 720 hours. TELEMETRY:  Sinus rhythm.      Assessment/Plan:     Active Hospital Problems    Pulmonary infiltrates  Likely infectious/inflammatory. No significant change with attempts at diuresis. Currently, her labs show prerenal azotemia which is mild. Can continue diuresis but if this worsens would hold further diuretics. Continue steroids and antibiotic therapy. Her clinical syndrome does not appear consistent with primary acute diastolic heart failure. *Acute respiratory failure (HCC)  Currently requiring high flow oxygen. Continue supportive care and wean as condition improves. Anemia, unspecified  Hemoglobin 9.3 today. Serial CBCs. Hypokalemia  Replacement ordered. Paroxysmal atrial fibrillation (HCC)  Currently in sinus rhythm. Continue flecainide and Eliquis. Monitor on telemetry. HTN (hypertension), benign  Blood pressure under reasonably good control. Hypokalemia: Replacement ordered. Left upper extremity edema: Await ultrasound findings. On anticoagulation.         Keagan Redd MD  12/9/2022 7:29 AM

## 2022-12-09 NOTE — CARE COORDINATION
Pt d/c this day to [facility].       12/09/22 1533   Discharge Planning   Patient expects to be discharged to: [facility]  [facility])   [phone] Discharge   Transition of Care Consult (CM Consult) [facility]   Mode of Transport at Discharge Other (see comment)  (staff)

## 2022-12-09 NOTE — PROGRESS NOTES
[facility] Critical Care Note[de-identified] 12/9/2022  Elizabeth Finnegan  Admission Date: 12/3/2022     Length of Stay: 6 days    Background: 82 y.o. female with a history of SSS s/p PCM, PAF on eliquis, recent CAP, obesity, and DVT. Presented to ER 12/3 with increased dyspnea, edema, orthopnea. Sat 53% on arrival. Normal WBC, BNP 2517, CXR with interstitial infiltrates. Admitted to ICU and diuresed. Placed on CPAP. TTE was normal. RVP was normal. .    Notable PMH:  has a past medical history of Chest pain, Dyspnea, GERD (gastroesophageal reflux disease), Headache, HTN (hypertension), benign, Malaise and fatigue, Paroxysmal atrial fibrillation ([address] Preoperative cardiovascular examination, Psychiatric disorder, PUD (peptic ulcer disease), Thromboembolus ([address] and Thyroid disease. 24 Hour events: Wore CPAP last night, able to be weaned to Airvo 75% 60L today- sats better at 97-98%. LA ultrasound to be done today- left arm has profound edema. Pt states left arm itches but denies pain. Feels less SOB today at rest and with conversation. Review of Systems: Comprehensive ROS negative except in HPI     Lines: (insertion date)    External Urinary Catheter (Active)      Drips: current dose (range)        Pertinent Exam:         Blood pressure 133/62, pulse 64, temperature 98.3 °F (36.8 °C), temperature source Axillary, resp. rate 30, height 5' 6\" (1.676 m), weight 195 lb 12.3 oz (88.8 kg), SpO2 91 %.    Intake/Output Summary (Last 24 hours) at 12/9/2022 0836  Last data filed at 12/9/2022 0600  Gross per 24 hour   Intake 555 ml   Output 1050 ml   Net -495 ml       Constitutional:  no acute distress, appears comfortable  EENMT:  Sclera clear, pupils equal, oral mucosa moist  Respiratory: CTA bilaterally; on airvo 75% 60L   Cardiovascular:  RRR with no M,G,R; sinus on monitor rate 60  Gastrointestinal:  soft with no tenderness; positive bowel sounds present  Musculoskeletal:  warm with no cyanosis, no lower extremity edema, +4 pitting edema in left arm > right   Skin:  no jaundice or ecchymosis  Neurologic: no acute abnormalities  Psychiatric: alert and oriented x4    CXR:   12/7 12/5      CT chest 12/5/22     Recent Labs     12/07/22 0437 12/08/22 0426 12/09/22  0709   WBC 14.5* 12.6* 15.3*   HGB 8.2* 8.3* 9.3*   HCT 27.8* 26.7* 29.1*    247 355       Recent Labs     12/07/22 0437 12/08/22 0426 12/09/22  0709    135 139   K 3.1* 3.1* 3.5    100* 100*   CO2 29 31 31   GLUCOSE 137* 148* 130*   BUN 44* 53* 54*   CREATININE 1.00 1.10* 1.00   MG 1.8  --   --        No results for input(s): TROPHS, NTPROBNP, CRP, ESR in the last 72 hours. Recent Labs     12/07/22 0437 12/08/22 0426 12/09/22  0709   GLUCOSE 137* 148* 130*        ECHO: 12/03/22    TRANSTHORACIC ECHOCARDIOGRAM (TTE) COMPLETE (CONTRAST/BUBBLE/3D PRN) 12/04/2022  1:05 PM (Final)    Interpretation Summary    Left Ventricle: Normal left ventricular systolic function with a visually estimated EF of 55 - 60%. Left ventricle size is normal. Normal wall thickness. Normal wall motion. Normal diastolic function. Technical qualifiers: Color flow Doppler was performed and pulse wave and/or continuous wave Doppler was performed. Signed by: Hayden Ibarra MD on 12/4/2022  1:05 PM    Assessment and Plan:  (Medical Decision Making)   Impression:  82 y.o. female with with history of SSS s/p pacer, a fib. Presenting with acute hypoxic respiratory failure. Initially felt to be due to volume overload but now appearing euvolemic and CXR not changes, ongoing high O2 needs, and now with fever. Not producing sputum for culture. RVP negative. Blood culture negative. NEURO:   Sedation: none  Analgesia: none needed  CV:   Volume Status: minimal LE edema, left arm is significantly swollen- plan for US today.   Net negative 495 mL in 24 hrs; getting IVP lasix 40 mg daily   Atrial-fib: Toprol XL, on flecainide; on eliquis; currently in SR in rate 60  PULM:   Acute hypoxemic respiratory failure:  remains on high O2 airvo, able to wean to 75% FIO2 sats 97-98%; Continue to wean as tolerates; continue IS   Added mucolytics for help with secretion management- still no sputum production  Continue solu-medrol  RENAL:  Electrolytes: Na 139, K3.5, Mg 1.8 replace as needed per protocol  GI:   Nutrition: tolerating regular PO diet   HEME:   Anemia: Hgb stable at 9.3 today   Anticoagulation: eliquis  ID:   Pneumonia: on cefepime; MRSA negative d/hcasidy vanc  Blood cx NGTD; unable to cough up sputum for culture yet  ENDO:   Hypothyroid: continue synthroid   Skin: no decub, turns, preventive care  Prophy: protonix and eliquis      Full Code    The patient is critically ill with respiratory failure, circulatory failure and requires high complexity decision making for assessment and support including frequent ventilator adjustment, frequent evaluation and titration of therapies , application of advanced monitoring technologies and extensive interpretation of multiple databases    In this split/shared evaluation I performed performed a medically appropriate history and exam, counseled and educated the patient and/or family member, documented information in EMR, and coordinated care. which accounted for 14 clinical time. MALGORZATA Yoon - NP  In this split/shared evaluation I performed reviewed the patients's H&P, available images, labs, cultures. , discussed case in detail with NPP, performed a medically appropriate history and exam, counseled and educated the patient and/or family member, ordered and/or reviewed medications, tests or procedures, documented information in EMR, independently interpreted images, and coordinated care. which accounted for 20 minutes clinical time. Impression:      82 y.o. female with with history of SSS s/p pacer, a fib.  Presenting with acute hypoxic respiratory failure. Initially felt to be due to volume overload but now appearing euvolemic and CXR not changes, ongoing high O2 needs, and now with fever. Not producing sputum for culture. RVP negative. Blood culture negative. PULM:   Acute hypoxemic respiratory failure:  patient down to 65 % airvo  CT scan with diffuse GGO. Started IV steroids 12/5 to cover possible inflammatory processes. Not much change yet. Will increase to 1g daily x 3 days then drop back down to 60 q 8  LUE swelling- US pending   Had positive Scl-70 Ab- elevated to 5. 6. will ask rheumatology to see   On cefepime since 12/5     Ok to transfer to the floor   Avi Piña MD

## 2022-12-09 NOTE — CARE COORDINATION
Pt accepted and approved for [facility]. Spoke with pt, spouse, and son, Blayne Francisco. Discussed at length goals of care and not to be d/c too soon from hospital. Continued care and specialities can follow, if needed. PT/OT will follow as pt can tolerate. Also discussed possible need for IRC, STR, or HH from 4th floor. Pt and family agree with POC for d/c to [facility], [facility]. MD notified and aware, as well as, CC for ICU. RN can call report to [phone] later, as will not d/c til 730pm. CM will continue to follow for any further needs/questions. Johanne Bingham, liaison, will reach out to pt and family for consent and to answer questions regarding [facility].

## 2022-12-10 LAB
C-ANCA TITR SER IF: NORMAL TITER
MYELOPEROXIDASE AB SER IA-ACNC: <0.2 UNITS (ref 0–0.9)
P-ANCA ATYPICAL TITR SER IF: NORMAL TITER
P-ANCA TITR SER IF: NORMAL TITER
PROTEINASE3 AB SER IA-ACNC: <0.2 UNITS (ref 0–0.9)

## 2022-12-10 NOTE — PROGRESS NOTES
Physician Progress Note      Marry Baxter  CSN #:                  [id number]  :                       1935  ADMIT DATE:       12/3/2022 2:56 PM  [address] DATE:        2022 8:30 PM  RESPONDING  PROVIDER #:        Mili Fletcher MD          QUERY TEXT:    - Maritzae Skiff and good morning-- IM asked that we send this to pulmonology   ---thank you      Pt admitted with Acute respiratory failure . Pt noted to have PNA. If   possible, please document in the progress notes and discharge summary if you   are evaluating and/or treating any of the following:    Note: CAP and HCAP indicate where the pneumonia was acquired, not a specific   type. The medical record reflects the following:  Risk Factors: recent hx of CAP, PAF, recent CHF diagnosis, HTN, obesity  Clinical Indicators: GGO per cxray, thoughts pulm edema vs atypical PNA. Doxy   now adding cefepime. cards--Suspect this is pneumonia/infection/chemical   pneumonitis from aspiration. Cxray remains same despite iv lasix. MRSA   neg--vanc discontinued. Treatment: iv lasix, iv cefepime, vanc, doxy, cxray, cards consult, labs,   cxray steroids, ICU care, high risk for intubation. Options provided:  -- Gram negative pneumonia  -- Gram positive pneumonia  -- MSSA pneumonia  -- Viral pneumonia  -- Aspiration pneumonia  -- Hypostatic pneumonia  -- Other - I will add my own diagnosis  -- Disagree - Not applicable / Not valid  -- Disagree - Clinically unable to determine / Unknown  -- Refer to Clinical Documentation Reviewer    PROVIDER RESPONSE TEXT:    This patient has viral pneumonia.     Query created by: Olena Garcia on 2022 10:18 AM      Electronically signed by:  Mili Fletcher MD 12/10/2022 12:35 PM

## 2022-12-11 LAB
BACTERIA SPEC CULT: NORMAL
SERVICE CMNT-IMP: NORMAL

## 2022-12-12 LAB — PATH REV BLD -IMP: NORMAL

## 2022-12-16 PROBLEM — M34.9 SCLERODERMA WITH PULMONARY INVOLVEMENT (HCC): Status: ACTIVE | Noted: 2022-01-01

## 2022-12-20 ENCOUNTER — TELEPHONE (OUTPATIENT)
Dept: CARDIOLOGY CLINIC | Age: 87
End: 2022-12-20

## 2022-12-20 NOTE — TELEPHONE ENCOUNTER
MEDICATION REFILL REQUEST      Name of Medication:  Metoprolol   Dose:  50 mg  Frequency:    Quantity:    Days' supply:  90      Pharmacy Name/Location:  [facility]

## 2022-12-30 PROBLEM — J96.01 ACUTE HYPOXEMIC RESPIRATORY FAILURE (HCC): Status: ACTIVE | Noted: 2022-01-01

## 2022-12-30 PROBLEM — I82.629 ACUTE DEEP VEIN THROMBOSIS (DVT) OF BRACHIAL VEIN (HCC): Status: ACTIVE | Noted: 2022-01-01

## 2022-12-30 PROBLEM — J96.01 ACUTE RESPIRATORY FAILURE WITH HYPOXIA (HCC): Status: ACTIVE | Noted: 2022-01-01

## 2022-12-30 PROBLEM — J96.01 ACUTE HYPOXEMIC RESPIRATORY FAILURE (HCC): Status: RESOLVED | Noted: 2022-01-01 | Resolved: 2022-01-01

## 2022-12-30 NOTE — PROGRESS NOTES
Pt arrived from [facility] after report given from [facility]. A&O but anxious, BP elevated above 996 systolic. Skin clean, dry, and intact. Currently on 100% BiPAP with O2 sats in the upper 90's. Precedex ordered by Dr. Brandi Huerta for anxiety/BiPAP compliance.

## 2022-12-30 NOTE — H&P
HISTORY AND PHYSICAL  Jonathan Fung  12/30/2022   Date of Admission:  12/30/2022    The patient's chart is reviewed and the patient is discussed with the staff. Subjective:     Patient is a 82 y.o  female seen and evaluated at the request of Dr. Leland Garibay. She has a PMH of chronic pain with narcotic dependence, recent DVT of upper extremity paroxysmal A-fib, hypothyroidism. She developed SOB and was admitted to [address] [place] [address] for concerns of PNA. After discharge from [facility] she had more respiratory issues and was brought to [facility]. CXR and CT scan concerning from some ILD changes. DEVORAH was positive with Scl-70. She has a history of Raynauds in hands. She was admitted to [facility] on 12/9 for acute respiratory failure and O2 weaning. She denies any history of lung diease prior to hospitalization . [place] and young adult and frequently picked cotton. She is a former smoker approx 25 pack year history, quit 40 years ago. She worked as a  in [employer]. No other known exposures. She has been off and on Airvo. She had a productive cough. O2 sats drop with any exertion. Decompensated with any de-escalation of steroids. Has been on optiflow with NRB mask to maintain O2 sats >90%. WOB worsened on the night of 12/29 on [facility] requiring BIPAP. Patient was transferred to ICU for closer monitoring and continuation of acute care. Review of Systems  Pertinent items are noted in HPI.     Current Outpatient Medications   Medication Instructions    albuterol (PROVENTIL) 2.5 mg, Nebulization, EVERY 8 HOURS PRN    amitriptyline (ELAVIL) 25 mg, Oral, NIGHTLY    apixaban (ELIQUIS) 5 MG TABS tablet TAKE 1 TABLET BY MOUTH  TWICE DAILY hold 48 hours prior to pacemaker placement    diclofenac sodium (VOLTAREN) 1 % GEL Topical, 4 TIMES DAILY PRN    ferrous sulfate (IRON 325) 325 mg, Oral, DAILY    flecainide (TAMBOCOR) 75 mg, Oral, 2 TIMES DAILY    FLUoxetine (PROZAC) 20 mg, Oral, DAILY    FLUoxetine (PROZAC) 40 mg, Oral, DAILY    furosemide (LASIX) 40 mg, IntraVENous, DAILY    hydrOXYzine HCl (ATARAX) 25 mg, Oral, NIGHTLY PRN    levothyroxine (SYNTHROID) 50 MCG tablet Oral, DAILY BEFORE BREAKFAST    metoprolol succinate (TOPROL XL) 50 mg, Oral, DAILY    mometasone-formoterol (DULERA) 200-5 MCG/ACT inhaler 2 puffs, Inhalation, 2 TIMES DAILY    Multiple Vitamins-Minerals (MULTI COMPLETE PO) Take by mouth    nitroGLYCERIN (NITROSTAT) 0.4 MG SL tablet Place 1 sl under the tongue q 5 min prn cp, max 3 sl in a 15-min time period. Call 911 if no relief after the 3rd sl.     omeprazole (PRILOSEC) 45 mg, Oral, DAILY    potassium chloride (KLOR-CON M) 20 MEQ extended release tablet 40 mEq, Oral, 2 TIMES DAILY WITH MEALS    Probiotic Product (PROBIOTIC PO) Oral    senna-docusate (PERICOLACE) 8.6-50 MG per tablet 1 tablet, Oral, Nightly    timolol (TIMOPTIC) 0.5 % ophthalmic solution 1 drop, Ophthalmic, DAILY    Vitamin A 2400 MCG (8000 UT) TABS Oral, DAILY    vitamin B-12 (CYANOCOBALAMIN) 1,000 mcg, Oral, DAILY      Past Medical History:   Diagnosis Date    Chest pain 10/30/2015    Dyspnea     GERD (gastroesophageal reflux disease)     Headache     HTN (hypertension), benign 10/30/2015    Malaise and fatigue     Paroxysmal atrial fibrillation ([facility] 10/30/2015    Preoperative cardiovascular examination     Psychiatric disorder     PUD (peptic ulcer disease)     Thromboembolus ([address]     leg    Thyroid disease      Past Surgical History:   Procedure Laterality Date    BREAST REDUCTION SURGERY      EP DEVICE PROCEDURE N/A 10/19/2022    INSERT PPM DUAL performed by Lazarus Rod, MD at [address] [place] [address] CATH LAB    EP DEVICE PROCEDURE N/A 10/19/2022    Loop recorder removal performed by Lazarus Rod, MD at [facility] CARDIAC CATH LAB    EYE SURGERY      HYSTERECTOMY (CERVIX STATUS UNKNOWN)      LAP,CHOLECYSTECTOMY      [place] APPENDECTOMY Social History     Socioeconomic History    Marital status:      Spouse name: Not on file    Number of children: Not on file    Years of education: Not on file    Highest education level: Not on file   Occupational History    Not on file   Tobacco Use    Smoking status: Former     Packs/day: 1.00     Types: Cigarettes    Smokeless tobacco: Former   Substance and Sexual Activity    Alcohol use: No    Drug use: Not on file    Sexual activity: Not on file   Other Topics Concern    Not on file   Social History Narrative    Not on file     Social Determinants of Health     Financial Resource Strain: Not on file   Food Insecurity: Not on file   Transportation Needs: Not on file   Physical Activity: Not on file   Stress: Not on file   Social Connections: Not on file   Intimate Partner Violence: Not on file   Housing Stability: Not on file     Family History   Problem Relation Age of Onset    Diabetes Brother     Cancer Father         liver    Cancer Brother         prostate    Heart Attack Brother     Cancer Mother         non hog    Diabetes Mother      Allergies   Allergen Reactions    Aloe Vera Other (See Comments)    Metoclopramide Other (See Comments)    Morphine Other (See Comments)    Penicillins Other (See Comments)    Sulfa Antibiotics Other (See Comments)    Sulfamethoxazole-Trimethoprim Other (See Comments)    Adhesive Tape Rash     Objective: There were no vitals filed for this visit. PHYSICAL EXAM   Constitutional:  the patient is anxious and using accessory muscles to breathe on BIPAP  EENMT:  Sclera clear, pupils equal, oral mucosa moist  Respiratory: symmetric chest rise. Crackles bilaterally; labored breathing; on 100% BIPAP  Cardiovascular:  tachycardic regular rate and rhythm without M,G,R. There is +1 lower extremity edema. +2 upper extremity edema bilateral  Gastrointestinal: soft and non-tender; with positive bowel sounds. Musculoskeletal: warm without cyanosis. Normal muscle tone. Skin:  no jaundice or rashes, right upper arm ecchymosis   Neurologic: symmetric strength, fluent speech  Psychiatric: anxious; oriented x 4    Imaging: I performed an independent interpretation of the patient's images. CXR:   12/24 12/18      CT chest   12/28 12/8      Results for orders placed during the hospital encounter of 12/09/22    XR CHEST 1 VIEW    Narrative  EXAM: Chest x-ray. INDICATION: Dyspnea. COMPARISON: December 7, 2022. TECHNIQUE: Frontal view chest x-ray. FINDINGS: Cardiomegaly and bilateral lung edema or infiltrates are unchanged. No  pneumothorax or pleural effusion is seen. Again noted is a left chest wall  pacemaker. Impression  Unchanged bilateral lung edema or infiltrates. Results for orders placed during the hospital encounter of 12/09/22    CT CHEST PULMONARY EMBOLISM W CONTRAST    Narrative  CHEST CT WITH CONTRAST, PULMONARY EMBOLISM PROTOCOL:    CLINICAL HISTORY:  Right upper extremity DVT with clinical concern for pulmonary  embolism in a former smoker. TECHNIQUE:  During bolus injection of nonionic intravenous contrast, the chest  was scanned with spiral technique, and coronal reformats were produced. Radiation dose reduction was achieved using one or all of the following  techniques: automated exposure control, weight-based dosing, iterative  reconstruction. COMPARISON:  Portable chest of February 24, 2022 and noncontrast CT of December 5, 2022. FINDINGS:  There is expected opacification of the pulmonary arterial tree with  no intraluminal soft tissue density to suggest acute pulmonary embolism. There  is no definite pneumothorax.   Extensive groundglass infiltrates are superimposed  upon centrilobular emphysema, and there has been interval development of  peripheral reticular densities suggestive of interstitial lung disease. No  pathologically enlarged lymph nodes or abnormal fluid collection is seen. The  epigastrium appears unremarkable as imaged. Impression  1. NO DEFINITE ACUTE PULMONARY EMBOLISM. 2.  NEW PERIPHERAL RETICULAR DENSITIES SUPERIMPOSED UPON EXTENSIVE GROUNDGLASS  INFILTRATES ARE NONSPECIFIC BUT MAY BE ASSOCIATED WITH INTERSTITIAL LUNG  DISEASE. 3.  EMPHYSEMA. LAB:  Recent Labs     12/28/22  1437 12/29/22  0900 12/30/22  0845   WBC 12.2* 14.4* 17.5*   HGB 9.2* 9.5* 9.6*   HCT 29.6* 30.6* 31.4*    181 213     Recent Labs     12/28/22  1437 12/29/22  0900 12/30/22  0845   NA  --  136 140   K  --  3.5 3.9   CL  --  100* 103   CO2  --  28 32   BUN  --  23 27*   CREATININE 0.80 0.80 0.50*     Recent Labs     12/29/22  0900   PROCAL 0.25   NTPROBNP 1,709*   CRP 23.5*     No results for input(s): PHAPOC, SYS9UGDL, HG3BNUC, EDV8OLD, BE in the last 72 hours. Microbiology:   No results for input(s): CULTURE in the last 72 hours. Assessment and Plan:  (Medical Decision Making)   Impression: 82 y.o. female who was transferred from [facility] with acute respiratory failure d/t underlying ILD concern for scleroderma with + DEVORAH and Anti-SCL 70 that worsened requiring BIPAP therapy. Has had upper extremity DVTs treated with eliquis/lovenox and later heparin drip. Bronch with BAL was planned but had to be postponed multiple times d/t worsening hypoxia and oxygen requirements. Acute respiratory failure with hypoxia  Plan: has had increased O2 needs during stay on [facility] and required BIPAP overnight last night. Now on 100% BIPAP. CRP 23.5, procal 0.25, ESR 41, WBC climbing   Rheumatology recommended Benlysta infusion which was started yesterday. Increase IV solu-medrol to 125mg q6   On atovaquone for PJP prophy d/t sulfa allergy.         Debility  Plan: was working with PT/OT but oxygen demands limited activity  Will resume PT when more stable      Chronic diastolic heart failure    Hypertension  Plan: BNP elevated yesterday to 1700, received one dose of 60 mg IVP lasix. Now HTN, on PO lopressor; give IV PRN hydralazine to manage BP        Bilateral arm DVT  Plan: was treated with eliquis/lovenox; was started on heparin gtt  Restart lovenox 80mg BID   Will order labs to check for antiphospholipid syndrome       Fibromyalgia  Plan: chronic pain issues with narcotic dependent; Will order PRNs  If Bronch needs to be done in the future will need to be planned with anesthesia. Start precedex gtt for agitation with claustrophobia history. More than 50% of the time documented was spent in face-to-face contact with the patient and in the care of the patient on the floor/unit where the patient is located. In this split/shared evaluation I performed performed a medically appropriate history and exam, counseled and educated the patient and/or family member, documented information in EMR, and coordinated care. which accounted for 18 clinical time. MALGORZATA Weiss - NP    In this split/shared evaluation I performed reviewed the patients's H&P, available images, labs, cultures. , discussed case in detail with NPP, performed a medically appropriate history and exam, counseled and educated the patient and/or family member, ordered and/or reviewed medications, tests or procedures, documented information in EMR, independently interpreted images, and coordinated care. which accounted for 25 minutes clinical time. Impression: 83 y/o female moved to ICU from [facility] today due to progressively worsening hypoxemia at [facility] while attempting to wean steroids. She has not been able to wean from IV steroids despite several attempts. She did recently get Benlysta yesterday for steroid sparing agent with rheum input. 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  She has seemed steroid responsive, but has been back on IV steroids for 2 days with continued worsening. I discussed with her family in detail her guarded prognosis at best. I think at 82 with aggressive CTD-ILD she has a very poor prognosis and would not likely survive intubation. They were very reasonable and understood, but still wanted to give her every chance to improve. We discussed bronchoscopy which she couldn't tolerate on BIPAP 100% and would need to be intubated or improved significantly to tolerate. She may not tolerate bronchoscopy even if intubated and there is also fairly low likelihood that the bronchoscopy would provide a definitive diagnosis or . She has been on Atovaquone for PJP prophy since I first met her 12/14 so I think this is unlikely. I will increase her steroids to 125mg IV Q6H for now, Received Benlysta yesterday, check CRP in AM. Change to Lovenox 1mg/kg as she has had difficulty with therapeutic levels with heparin gtt and this really is just much higher risk for complications over Lovenox. She has been changed to IV lasix for some additional diuresis with CHF history and elevated BNP. Remains critically ill.      Zulay Cha MD

## 2022-12-31 PROBLEM — R50.9 FEVER: Status: ACTIVE | Noted: 2022-01-01

## 2022-12-31 PROBLEM — I48.19 PERSISTENT ATRIAL FIBRILLATION (HCC): Status: ACTIVE | Noted: 2022-01-01

## 2022-12-31 PROBLEM — I48.91 ATRIAL FIBRILLATION WITH RVR (HCC): Status: ACTIVE | Noted: 2022-01-01

## 2022-12-31 NOTE — PROGRESS NOTES
Pt family out to nurses station - ready to pursue comfort measures. Dr. Patty Ellis aware. PRN Ativan/Dilaudid given, placed on 2L NC. Family and  at bedside.

## 2022-12-31 NOTE — PROGRESS NOTES
VANCO DAILY FOLLOW UP NOTE  [address] [facility] Pharmacokinetic Monitoring Service - Vancomycin    Consulting Provider: Gabriella Vazquez   Indication: sepsis  Target Concentration: Goal AUC/JOLIE 400-600 mg*hr/L  Day of Therapy: 1  Additional Antimicrobials: cefepime    Patient eligible for piperacillin-tazobactam to cefepime auto-substitution per P&T approved protocol? N/A    Pertinent Laboratory Values: Wt Readings from Last 1 Encounters:   12/30/22 182 lb (82.6 kg)     Temp Readings from Last 1 Encounters:   12/31/22 (!) 100.8 °F (38.2 °C) (Axillary)     Recent Labs     12/29/22  0900 12/30/22  0845 12/31/22  0057   BUN 23 27* 37*   CREATININE 0.80 0.50* 0.70   WBC 14.4* 17.5* 9.8   PROCAL 0.25  --   --      Estimated Creatinine Clearance: 64 mL/min (based on SCr of 0.7 mg/dL). No results found for: Kimberley Cox    MRSA Nasal Swab: N/A.  Non-respiratory infection      Assessment:  Date/Time Dose Concentration AUC         Note: Serum concentrations collected for AUC dosing may appear elevated if collected in close proximity to the dose administered, this is not necessarily an indication of toxicity    Plan:  Dosing recommendations based on Bayesian software  Start vancomycin 1250 mg q 18 hours  Anticipated AUC of 509 and trough concentration of 15.9 at steady state  Renal labs as indicated   Vancomycin concentrations will be ordered as clinically appropriate   Pharmacy will continue to monitor patient and adjust therapy as indicated    Thank you for the consult,  Anna Prince, [facility]

## 2022-12-31 NOTE — PROGRESS NOTES
Met with family at bedside. She is clearly worsening despite move to ICU and pulse dose steroids. She is now hypercarbic and looks miserable on BIPAP. They had decided not to pursue intubation earlier today and have now agreed to comfort measures after family has visited with her. Plan for comfort care later today. Orders placed when ready.      Codi Samson MD

## 2022-12-31 NOTE — PROGRESS NOTES
Pt alarming asystole on the monitor - upon entry to room pt not breathing, no pulse present. Dr. Caro Salvage made aware, coming to pronounce.

## 2022-12-31 NOTE — DISCHARGE SUMMARY
Death Summary    Kenton Jacobson  Admission date:  12/30/2022  Discharge date:  12/31/22    Admitting Diagnosis:    Acute respiratory failure with hypoxia ([facility] [J96.01]  Acute hypoxemic respiratory failure ([facility] [J96.01]    Discharge Diagnosis:    Principal Problem:    Acute respiratory failure with hypoxia (HCC)  Active Problems:    Persistent atrial fibrillation (HCC)    Pulmonary infiltrates    Acute congestive heart failure (HCC)    Scleroderma with pulmonary involvement (HCC)    Acute deep vein thrombosis (DVT) of brachial vein (HCC)    Atrial fibrillation with RVR (HCC)    Fever    Anticoagulant long-term use    Paroxysmal atrial fibrillation ([facility]    HTN (hypertension), benign  Resolved Problems:    Acute hypoxemic respiratory failure ([facility]    Consultants:   IP CONSULT TO CARDIOLOGY  IP CONSULT TO PHARMACY    Studies/Procedures: 12/03/22    TRANSTHORACIC ECHOCARDIOGRAM (TTE) COMPLETE (CONTRAST/BUBBLE/3D PRN) 12/04/2022  1:05 PM (Final)    Interpretation Summary    Left Ventricle: Normal left ventricular systolic function with a visually estimated EF of 55 - 60%. Left ventricle size is normal. Normal wall thickness. Normal wall motion. Normal diastolic function. Technical qualifiers: Color flow Doppler was performed and pulse wave and/or continuous wave Doppler was performed. Signed by: Ciarra Mcmillan MD on 12/4/2022  1:05 PM   XR CHEST 1 VIEW    Result Date: 12/31/2022  EXAM: Chest x-ray. INDICATION: Dyspnea. COMPARISON: December 24, 2022. TECHNIQUE: Frontal view chest x-ray. FINDINGS: There is progressed patchy diffuse edema or infiltrates throughout both lungs. The heart is upper normal in size. No pneumothorax or pleural effusion is seen. Again noted is a left chest wall pacemaker. Progressed bilateral lung edema or infiltrates.    * No surgery found [facility] course:  82 y.o. female with PMH of chronic pain with narcotic dependence, recent DVT of upper extremity paroxysmal A-fib, hypothyroidism. She developed SOB and was admitted to [address] [place] [address] for concerns of PNA. After discharge from [facility] she had more respiratory issues and was brought to [facility]. CXR and CT scan concerning from some ILD changes. DEVORAH was positive with Scl-70. She has a history of Raynauds in hands. She was admitted to [facility] [place] [facility]"" on  for acute respiratory failure and O2 weaning. She denies any history of lung diease prior to hospitalization . [place] farm [place] a child and young adult and frequently picked cotton. She is a former smoker approx 25 pack year history, quit 40 years ago. She worked as a  in [employer]. No other known exposures. She has been off and on Airvo. She had a productive cough. O2 sats drop with any exertion. Decompensated with any de-escalation of steroids. Has been on optiflow with NRB mask to maintain O2 sats >90%. WOB worsened on the night of  on [facility] requiring BIPAP. Patient was transferred to ICU for closer monitoring and continuation of acute care. We attempted to control anxiety with precedex on BIPAP and gave her pulse dose steroids. Despite this she continued to decline and had worsening hypoxemia and hypercapnea and visually looked much worse. Met with family at bedside. She is clearly worsening despite move to ICU and pulse dose steroids. She is now hypercarbic and looks miserable on BIPAP. They had decided not to pursue intubation earlier today and have now agreed to comfort measures after family has visited with her. She  in comfort with family surrounding her. Final:  --Pronounced dead at 1851 on 2022. --Total discharge greater than 30 minutes in duration.     Zulay Cha MD

## 2022-12-31 NOTE — CONSULTS
[facility]  [address] [facility], [address] [facility], [address] [place] [address]  PHONE: [phone]        22      NAME:  Hermelinda Neumann  : 1935  MRN: [id number]     Requesting Physician: Ashli Sal MD     Reason for Consultation: Atrial fibrillation with RVR    Primary Cardiologist: Sumeet Jimenez. Javid Burrell II, MD    ASSESSMENT and PLAN:  Atrial fibrillation with RVR  DVT of LUE (likely as a consequence of PPM implant  Rheumatologic disease   ILD  DEVORAH and Scl-70 +  Raynauds Phenomenon  Hypoxia  Acute on chronic respiratory failure    82year old female with complex cardiopulmonary disease with difficult 1-2 months with chronic hypoxia with transfer to ICU from [facility] with increased WOB. -AF - persistent, likely to continue. Rate control best option for now and would continue for now. She may need AV node ablation the coming time if her AF significantly complicates her recovery.   -History of PPM - device interrogation performed today.  -Stroke ppx - Eliquis at home, now on Lovenox.  -Respiratory failure - likely driving her AF and her biggest issue. Complicated, following with pulm. -HAP - continue abx.   -Rheum disease - per medicine/rheum/pulm. -Dispo - poor long term prognosis. Ongoing ICU care needs. Thank you for allowing me to participate in the electrophysiologic care of Ms. Hermelinda Neumann. Please contact me if any questions or concerns were to arise. Irina Smith MD, MS  Clinical Cardiac Electrophysiology  [facility]  22  11:45 AM    ===================================================================  Chief Complant:  AF with RVR      Consultation is requested by Dr. Segundo Fairchild for evaluation of Atrial fibrillation. History:  Hermelinda Neumann is a most pleasant 82 y.o. female with a past medical and cardiac history significant for chronic pain with narcotic dependence, recent DVT of upper extremity, paroxysmal A-fib, hypothyroidism.  We are asked to see the patient in consultation. She developed SOB and was admitted to [address] [place] [address] for concerns of PNA. After discharge from [facility] she had more respiratory issues and was brought to [facility]. CXR and CT scan concerning from some ILD changes. DEVORAH was positive with Scl-70. She has a history of Raynauds in hands. She was [facility] to [facility] on 12/9 for acute respiratory failure and O2 weaning. She denies any history of lung diease prior to hospitalization . She lived on a farm as a child and young adult and frequently picked cotton. She is a former smoker approx 25 pack year history, quit 40 years ago. She worked as a  in [employer]. No other known exposures. She has been off and on Airvo. She had a productive cough. O2 sats drop with any exertion. Decompensated with any de-escalation of steroids. Has been on optiflow with NRB mask to maintain O2 sats >90%. WOB worsened on the night of 12/29 on [facility] requiring BIPAP. Patient was transferred to ICU for closer monitoring and continuation of acute care. In the setting of her multisystem critical illness, she has developed AF with RVR to which we are asked to see her for. She does have a PPM. She is currently on a diltiazem gtt and flecainide has been on hold especially given her NPO status. Rates are in the 110s. She is anemic which is chronic. Her renal function is stable. Elytes fairly stable. WBC improved since admission. She was noted to have bradycardia and long pauses on an ILR this fall and Dr. Lois Lockhart implanted a DCPM in 10/2022. At home, she takes metoprolol XL 50 mg daily, flecainide 75 mg po BID, Eliquis 5 mg po BID    Cardiac PMH: (Old records have been reviewed and summarized below)  Recurrent syncope. Echocardiogram 2012 with normal left ventricular systolic function. No major valvular disease. Mild mitral regurgitation. Implantable loop recorder placed 2019 for recurrent syncope.     1/28/21 Sinus  Bradycardia Low voltage in precordial leads. Decreasing R-wave progression -may be secondary to pulmonary disease   consider old anterior infarct. 10/2024-second sinus pause on monitor. Symptoms    2/9/2022 device interrogation no events of atrial fibrillation no bradycardia arrhythmias or significant pauses noted. 2/10/2022 sinus bradycardia normal DE intervals, ST wave normal, normal axis  8/2022 echocardiogram ejection fraction 55 to 60% no significant mitral regurgitation noted normal diastolic function    EKG:  (EKG has been independently visualized by me with interpretation below): AF with RVR, normal axis, ST changes are observed. ECHO: 12/3/2022    Left Ventricle: Normal left ventricular systolic function with a visually estimated EF of 55 - 60%. Left ventricle size is normal. Normal wall thickness. Normal wall motion. Normal diastolic function. Technical qualifiers: Color flow Doppler was performed and pulse wave and/or continuous wave Doppler was performed. Previous Heart Catheterization: n/a     Stress Test: n/a     DEVICE INTERROGATION: [facility], imlanted 10/19/2022. All capture thresholds, lead sensing and impedance measurements are stable and consistent with a normal functioning device. Battery life is stable. Last interrogation in office on 11/15/2022, no significant arrhythmia. No AF/AFL. AP 42%, <1% . Past Medical History, Past Surgical History, Family history, Social History, and Medications were all reviewed with the patient today and updated as necessary.      Current Facility-Administered Medications   Medication Dose Route Frequency Provider Last Rate Last Admin    dilTIAZem 100 mg in sodium chloride 0.9 % 100 mL infusion ([facility])  2.5-15 mg/hr IntraVENous Continuous Leanna Velez MD 15 mL/hr at 12/31/22 0701 15 mg/hr at 12/31/22 0701    acetaminophen (TYLENOL) suppository 650 mg  650 mg Rectal Q4H PRN Mahi Martin MD   650 mg at 12/31/22 0807    methylPREDNISolone sodium (SOLU-MEDROL) injection 250 mg  250 mg IntraVENous Q6H Hema Robbins MD        cefepime (MAXIPIME) 2,000 mg in sodium chloride 0.9 % 50 mL IVPB mini-bag  2,000 mg IntraVENous Q8H Hema Robbins MD        vancomycin [facility]) 1750 mg in sodium chloride 0.9 % 500 mL IVPB  20 mg/kg IntraVENous Once Kaushik Jewell  mL/hr at 12/31/22 0949 1,750 mg at 12/31/22 0949    [START ON 1/1/2023] vancomycin (VANCOCIN) 1250 mg in sodium chloride 0.9% 250 mL IVPB  1,250 mg IntraVENous Q18H Kaushik Jewell MD        dexmedetomidine (PRECEDEX) 400 mcg in sodium chloride 0.9 % 100 mL infusion  0.1-1.5 mcg/kg/hr (Order-Specific) IntraVENous Continuous Hema Robbins MD 33 mL/hr at 12/31/22 0934 1.5 mcg/kg/hr at 12/31/22 0934    ipratropium-albuterol (DUONEB) nebulizer solution 1 ampule  1 ampule Inhalation Q4H WA MALGORZATA Bryant CNP   1 ampule at 12/31/22 1127    budesonide (PULMICORT) nebulizer suspension 500 mcg  0.5 mg Nebulization BID MALGORZATA Bryant - CNP   500 mcg at 12/31/22 0802    furosemide (LASIX) injection 40 mg  40 mg IntraVENous Daily MALGORZATA Bryant - CNP   40 mg at 12/31/22 0806    enoxaparin (LOVENOX) injection 80 mg  80 mg SubCUTAneous Q12H MALGORZATA Bryant CNP   80 mg at 12/31/22 2418    sennosides-docusate sodium (SENOKOT-S) 8.6-50 MG tablet 2 tablet  2 tablet Oral Daily PRN MALGORZATA Bryant CNP        metoprolol tartrate (LOPRESSOR) tablet 25 mg  25 mg Oral BID MALGORZATA Bryant CNP   25 mg at 12/30/22 1729    levothyroxine (SYNTHROID) tablet 50 mcg  50 mcg Oral Daily MALGORZATA Palma CNP        famotidine (PEPCID) 20 mg in sodium chloride (PF) 0.9 % 10 mL injection  20 mg IntraVENous Q12H MALGORZATA Bryant CNP   20 mg at 12/31/22 0509    atovaquone (MEPRON) suspension 1,500 mg  1,500 mg Oral Daily Kaushik Jewell MD        flecainide (TAMBOCOR) tablet 75 mg  75 mg Oral BID Kaushik Jewell MD   75 mg at 12/30/22 1510    amitriptyline (ELAVIL) tablet 12.5 mg 12.5 mg Oral Nightly Shirazyoung Fransisca APRN - CNP   12.5 mg at 12/30/22 2104    DULoxetine (CYMBALTA) extended release capsule 30 mg  30 mg Oral Daily Shiraznton Fransisca APRN - CNP        gabapentin (NEURONTIN) capsule 100 mg  100 mg Oral TID Shirazntjuarez Gongora, APRN - CNP   100 mg at 12/30/22 2103    HYDROcodone-acetaminophen (NORCO) 5-325 MG per tablet 1 tablet  1 tablet Oral Q6H Shirazyoung Fransisca, APRN - CNP   1 tablet at 12/30/22 1755    timolol (TIMOPTIC) 0.25 % ophthalmic solution 1 drop  1 drop Both Eyes BID Victoria Gongora, APRN - CNP   1 drop at 12/31/22 3385    hydrALAZINE (APRESOLINE) injection 20 mg  20 mg IntraVENous Q4H PRN Krystal Gray MD         Allergies   Allergen Reactions    Aloe Vera Other (See Comments)    Metoclopramide Other (See Comments)    Morphine Itching    Penicillins Other (See Comments)    Sulfa Antibiotics Other (See Comments)    Sulfamethoxazole-Trimethoprim Other (See Comments)    Adhesive Tape Rash       Past Medical History:   Diagnosis Date    Chest pain 10/30/2015    Dyspnea     GERD (gastroesophageal reflux disease)     Headache     HTN (hypertension), benign 10/30/2015    Malaise and fatigue     Paroxysmal atrial fibrillation ([address] 10/30/2015    Preoperative cardiovascular examination     Psychiatric disorder     PUD (peptic ulcer disease)     Thromboembolus ([address]     leg    Thyroid disease      Past Surgical History:   Procedure Laterality Date    BREAST REDUCTION SURGERY      EP DEVICE PROCEDURE N/A 10/19/2022    INSERT PPM DUAL performed by Erica Adkins MD at [address] [place] [address] CATH LAB    EP DEVICE PROCEDURE N/A 10/19/2022    Loop recorder removal performed by Erica Adkins MD at [facility] CARDIAC CATH LAB    EYE SURGERY      HYSTERECTOMY (CERVIX STATUS UNKNOWN)      LAP,CHOLECYSTECTOMY      CO APPENDECTOMY       Family History   Problem Relation Age of Onset    Diabetes Brother     Cancer Father         liver    Cancer Brother         prostate    Heart Attack Brother     Cancer Mother         non hog    Diabetes Mother      Social History     Tobacco Use    Smoking status: Former     Packs/day: 1.00     Types: Cigarettes    Smokeless tobacco: Former   Substance Use Topics    Alcohol use: No       ROS:  A comprehensive review of systems was performed with the pertinent positives and negatives as noted in the HPI in addition to:  Review of Systems   Constitutional: Negative. HENT: Negative. Eyes: Negative. Respiratory:  Positive for shortness of breath. Cardiovascular: Negative. Gastrointestinal: Negative. Endocrine: Negative. Genitourinary: Negative. Musculoskeletal: Negative. Skin: Negative. Allergic/Immunologic: Negative. Neurological: Negative. Hematological: Negative. Psychiatric/Behavioral: Negative. All other systems reviewed and are negative. PHYSICAL EXAM:   /70   Pulse (!) 115   Temp (!) 100.8 °F (38.2 °C) (Axillary)   Resp 30   Ht 5' 6\" (1.676 m)   Wt 182 lb (82.6 kg)   SpO2 99%   BMI 29.38 kg/m²      Wt Readings from Last 3 Encounters:   12/30/22 182 lb (82.6 kg)   12/04/22 195 lb 12.3 oz (88.8 kg)   11/21/22 195 lb (88.5 kg)     BP Readings from Last 3 Encounters:   12/31/22 118/70   12/09/22 (!) 127/58   11/21/22 126/66       Gen: Well appearing, well developed, no acute distress  Eyes: Pupils equal, round. Extraocular movements are intact  ENT: Oropharynx clear, no oral lesions, normal dentition  CV: S1S2, regular rate and rhythm, no murmurs, rubs or gallops, normal JVD, no carotid bruits, normal distal pulses, no YULISA  Pulm: Clear to auscultation bilaterally, no accessory muscle uses, no wheezes or rales  GI: Soft, NT, ND, +BS  Neuro: Alert and oriented, nonfocal  Psych: Appropriate affect  Skin: Normal color and skin turgor  MSK: Normal muscle bulk and tone    Medical problems and test results were reviewed with the patient today.      Results for orders placed or performed during the hospital encounter of 12/30/22 XR CHEST 1 VIEW    Narrative    EXAM: Chest x-ray. INDICATION: Dyspnea. COMPARISON: December 24, 2022. TECHNIQUE: Frontal view chest x-ray. FINDINGS: There is progressed patchy diffuse edema or infiltrates throughout  both lungs. The heart is upper normal in size. No pneumothorax or pleural  effusion is seen. Again noted is a left chest wall pacemaker. Impression    Progressed bilateral lung edema or infiltrates.    C-Reactive Protein   Result Value Ref Range    CRP 15.7 (H) 0.0 - 0.9 mg/dL   CBC with Auto Differential   Result Value Ref Range    WBC 9.8 4.3 - 11.1 K/uL    RBC 3.01 (L) 4.05 - 5.2 M/uL    Hemoglobin 8.7 (L) 11.7 - 15.4 g/dL    Hematocrit 27.9 (L) 35.8 - 46.3 %    MCV 92.7 82 - 102 FL    MCH 28.9 26.1 - 32.9 PG    MCHC 31.2 (L) 31.4 - 35.0 g/dL    RDW 18.7 (H) 11.9 - 14.6 %    Platelets 928 973 - 068 K/uL    MPV 10.2 9.4 - 12.3 FL    nRBC 0.00 0.0 - 0.2 K/uL    Differential Type AUTOMATED      Seg Neutrophils 90 (H) 43 - 78 %    Lymphocytes 4 (L) 13 - 44 %    Monocytes 4 4.0 - 12.0 %    Eosinophils % 0 (L) 0.5 - 7.8 %    Basophils 0 0.0 - 2.0 %    Immature Granulocytes 2 0.0 - 5.0 %    Segs Absolute 8.9 (H) 1.7 - 8.2 K/UL    Absolute Lymph # 0.4 (L) 0.5 - 4.6 K/UL    Absolute Mono # 0.4 0.1 - 1.3 K/UL    Absolute Eos # 0.0 0.0 - 0.8 K/UL    Basophils Absolute 0.0 0.0 - 0.2 K/UL    Absolute Immature Granulocyte 0.2 0.0 - 0.5 K/UL   Lactate Dehydrogenase   Result Value Ref Range     (H) 110 - 210 U/L   Basic Metabolic Panel w/ Reflex to MG   Result Value Ref Range    Sodium 141 133 - 143 mmol/L    Potassium 3.7 3.5 - 5.1 mmol/L    Chloride 103 101 - 110 mmol/L    CO2 32 21 - 32 mmol/L    Anion Gap 6 2 - 11 mmol/L    Glucose 139 (H) 65 - 100 mg/dL    BUN 37 (H) 8 - 23 MG/DL    Creatinine 0.70 0.6 - 1.0 MG/DL    Est, Glom Filt Rate >60 >60 ml/min/1.73m2    Calcium 9.8 8.3 - 10.4 MG/DL   Arterial Blood Gas, POC   Result Value Ref Range    DEVICE BIPAP MASK      FIO2 80 %    pH, Arterial, POC 7.31 (L) 7.35 - 7.45      pCO2, Arterial, POC 67.5 (HH) 35 - 45 MMHG    pO2, Arterial, POC 98 75 - 100 MMHG    HCO3, Mixed 34.0 (H) 22 - 26 MMOL/L    SO2c, Arterial, POC 96.6 95 - 98 %    Base Excess 5.9 mmol/L    Mode BILEVEL      POC PEEP 10 cmH2O    IPAP/PIP/High PEEP 22      POC Pressure Support 2 cmH2O    POC Thomas's Test Positive      Inspiratory Time 0.7 sec    Respiratory Rate 35      Site LEFT RADIAL      Specimen type: ARTERIAL      Performed by: Paul     POC TCO2 34 (H) 13 - 23 MMOL/L    Critical Value Read Back SINE     Respiratory Comment: ve28.3    EKG 12 Lead   Result Value Ref Range    Ventricular Rate 127 BPM    Atrial Rate 141 BPM    QRS Duration 102 ms    Q-T Interval 360 ms    QTc Calculation (Bazett) 523 ms    R Axis 5 degrees    T Axis 198 degrees    Diagnosis       Atrial fibrillation with rapid ventricular response

## 2022-12-31 NOTE — PROGRESS NOTES
Patient continues to be restless and agitated. 12 lead EKG showed Afib RVR. Dr Camille Copeland notified and orders received for cardizem bolus and gtt.

## 2022-12-31 NOTE — PROGRESS NOTES
Patient placed on V60. Connected to the Nurse Call System and Continuous Pulse Oximetry utilizing Vital Sync. Alarms are activated and nurse has been notified. Documentation completed.       12/30/22 1906   NIV Type   Skin Assessment Clean, dry, & intact   Skin Protection for O2 Device No   NIV Started/Stopped On   Equipment Type v60   Mode Bilevel   Mask Type Other (Comment)  (under nose)   Mask Size Medium   Settings/Measurements   PIP Observed 16 cm H20   IPAP 12 cmH20   CPAP/EPAP 10 cmH2O   Vt (Measured) 986 mL   Rate Ordered 18   FiO2  80 %   I Time/ I Time % 0.7 s   Minute Volume (L/min) 18.4 Liters   Comfort Level Good   Using Accessory Muscles No   SpO2 95   Patient's Home Machine No   Breath Sounds   Right Upper Lobe Diminished   Right Middle Lobe Diminished   Left Upper Lobe Diminished   Left Lower Lobe Diminished   Alarm Settings   Alarms On Y   Low Pressure (cmH2O) 8 cmH2O   High Pressure (cmH2O) 50 cmH2O   Apnea (secs) 20 secs   RR Low (bpm) 8   RR High (bpm) 50 br/min

## 2022-12-31 NOTE — PROGRESS NOTES
[facility] Critical Care Note[de-identified] 12/31/2022  Kip Splinter  Admission Date: 12/30/2022     Length of Stay: 1 days    Background: 82 y.o. female with PMH of chronic pain with narcotic dependence, recent DVT of upper extremity paroxysmal A-fib, hypothyroidism. She developed SOB and was admitted to [address] [place] [address] for concerns of PNA. After discharge from [facility] she had more respiratory issues and was brought to [facility]. CXR and CT scan concerning from some ILD changes. DEVORAH was positive with Scl-70. She has a history of Raynauds in hands. She was admitted to [facility] on 12/9 for acute respiratory failure and O2 weaning. She denies any history of lung diease prior to hospitalization . [place] and young adult and frequently picked cotton. She is a former smoker approx 25 pack year history, quit 40 years ago. She worked as a  in [employer]. No other known exposures. She has been off and on Airvo. She had a productive cough. O2 sats drop with any exertion. Decompensated with any de-escalation of steroids. Has been on optiflow with NRB mask to maintain O2 sats >90%. WOB worsened on the night of 12/29 on [facility] requiring BIPAP. Patient was transferred to ICU for closer monitoring and continuation of acute care. .    Notable PMH:  has a past medical history of Chest pain, Dyspnea, GERD (gastroesophageal reflux disease), Headache, HTN (hypertension), benign, Malaise and fatigue, Paroxysmal atrial fibrillation ([address] Preoperative cardiovascular examination, Psychiatric disorder, PUD (peptic ulcer disease), Thromboembolus ([address] and Thyroid disease. 24 Hour events: Pt was moved from [facility] to ICU yesterday. Has been on bipap 12/10. Currently 80%. Went into a fib with RVR overnight. Started on dilt drip but still HR in the 120-130's. Febrile overnight to 100.8.        Review of Systems: Comprehensive ROS negative except in HPI    Lines: (insertion date) External Urinary Catheter (Active)     Midline Single Lumen 43/61/41 Left Basilic (Active)     Drips: current dose (range)  Dose (mcg/kg/hr) Dexmedetomidine: 0.6 mcg/kg/hr     Pertinent Exam:         Blood pressure 129/77, pulse (!) 117, temperature (!) 100.8 °F (38.2 °C), temperature source Axillary, resp. rate 19, height 5' 6\" (1.676 m), weight 182 lb (82.6 kg), SpO2 96 %. Intake/Output Summary (Last 24 hours) at 12/31/2022 0751  Last data filed at 12/31/2022 0545  Gross per 24 hour   Intake 366.88 ml   Output 610 ml   Net -243.12 ml     Constitutional:  Resting in bed. Anxious. EENMT:  Sclera clear, pupils equal, oral mucosa moist  Respiratory: B inspiratory crackles  Cardiovascular:  Tachy, irregular. Gastrointestinal:  soft with no tenderness; positive bowel sounds present  Musculoskeletal:  warm with no cyanosis, no lower extremity edema  Skin:  no jaundice or ecchymosis  Neurologic:moving all 4. Not following commands  Psychiatric: agitated. Eyes closed. Not interactive    CXR:   12/31/22        CT chest 12/28/22    Recent Labs     12/29/22  0900 12/30/22  0845 12/31/22  0057   WBC 14.4* 17.5* 9.8   HGB 9.5* 9.6* 8.7*   HCT 30.6* 31.4* 27.9*    213 158   PROCAL 0.25  --   --      Recent Labs     12/29/22  0900 12/30/22  0845 12/31/22  0057    140 141   K 3.5 3.9 3.7   * 103 103   CO2 28 32 32   GLUCOSE 208* 146* 139*   BUN 23 27* 37*   CREATININE 0.80 0.50* 0.70     Recent Labs     12/29/22  0900 12/31/22  0057   NTPROBNP 1,709*  --    CRP 23.5* 15.7*     Recent Labs     12/29/22  0900 12/30/22  0845 12/31/22  0057   GLUCOSE 208* 146* 139*      ECHO: 12/03/22    TRANSTHORACIC ECHOCARDIOGRAM (TTE) COMPLETE (CONTRAST/BUBBLE/3D PRN) 12/04/2022  1:05 PM (Final)    Interpretation Summary    Left Ventricle: Normal left ventricular systolic function with a visually estimated EF of 55 - 60%. Left ventricle size is normal. Normal wall thickness. Normal wall motion.  Normal diastolic function. Technical qualifiers: Color flow Doppler was performed and pulse wave and/or continuous wave Doppler was performed. Signed by: Caleb Oakes MD on 12/4/2022  1:05 PM    Microbiology:   No results for input(s): CULTURE in the last 72 hours. Ventilator Settings Ideal body weight: 59.3 kg (130 lb 11.7 oz)  Adjusted ideal body weight: 68.6 kg (151 lb 3.8 oz)  Mode FIO2 Rate Tidal Volume Pressure        80 %                  Peak airway pressure:     Minute ventilation:    ABG:No results for input(s): PHAPOC, [id number], ID3QAJB, NXQ6MKS, BE in the last 72 hours. Assessment and Plan:  (Medical Decision Making)   Impression: 82 y.o. female who was transferred from [facility] with acute respiratory failure d/t underlying ILD concern for scleroderma with + DEVORAH and Anti-SCL 70 that worsened requiring BIPAP therapy. Has had upper extremity DVTs treated with eliquis/lovenox and later heparin drip. Bronch with BAL was planned but had to be postponed multiple times d/t worsening hypoxia and oxygen requirements and now cancelled altogether. NEURO:   Sedation: getting precedex for anxiety. CV:   Volume Status: appears euvolemic but getting lasix 40mg IV daily. Continue for now. A fib with RVR: currently on dilt drip. Will ask cardiology to see. Already ordered flecainide but not taking while NPO. PULM:   Acute hypoxemic respiratory failure: With high FiO2 needs. Diffuse B infiltrates. Multiple covid/viral tests have been negative. 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 Has been on steroids and received a dose of Benlysta at direction of rheumatology. Currently on 80% Fio2. Has indicated that she does not want to be trached. Steroids were increased to 125 q 6 12/30. Will further increase to 250 q 6 x 3 days then back to 125 q 6. RENAL:  Electrolytes: replace as needed  GI:   Nutrition: NPO while on bipap. HEME:   Anemia: 8.7. monitor.    Anticoagulation: treatment dose lovenox for UE DVT. ID:   Septic Shock: fever last night. Get blood and urine cultures. On Atovaquone for PJP prophylaxis. Will start vanc and cefepime for now as well. ENDO:   Hypothyroidism: cont synthroid. Skin: no decub, turns, preventive care  Prophy: loevnox. Pepcid. Overall poor prognosis given lack of response to therapy thusfar. Indicating that she would not want to have trach last night. Unable to converse at present. Full Code    The patient is critically ill with respiratory failure, circulatory failure and requires high complexity decision making for assessment and support including frequent ventilator adjustment, frequent evaluation and titration of therapies , application of advanced monitoring technologies and extensive interpretation of multiple databases    Cumulative time devoted to patient care services by me for day of service is 37 mins.     Jericoh Wick MD

## 2023-01-01 NOTE — PROGRESS NOTES
Death Note    Nell Leyva  Admission date:  2022    Admitting Diagnosis:  Acute respiratory failure with hypoxia ([facility] [J96.01]  Acute hypoxemic respiratory failure ([facility] [J96.01]    Called to evaluate patient who was found by nursing to have . On arrival patient was found to be unresponsive. Physical exam was performed and the patient was noted to be apneic, asystolic, pupils were fixed and dilated, with absent occulocephalic reflex. Patient pronounced dead at [address] [place] [address] on 2022. Cause of death felt to be Acute hypoxemic and hypercapneic respiratory failure secondary to connective tissue disease interstitial lung disease.      Mattie Carroll MD

## 2023-01-01 NOTE — PROGRESS NOTES
[address] [place] [address] received request from RN to provide EOL care for Family as PT was made Comfort Care. PT was in bed surrounded by Family. Family appeared tearful but seemed to be grieving appropriately. Family expressed importance of waldo and prayer. PT is Mu-ism. Family expressed comfort in PT's strong waldo and knowing that PT will be with the [address]  offered prayer ending with [address]. [address] [place] [address] offered hospitality and additional support. [address] [place] [address] offered 's condolences and gave Family privacy. Rev. Bisi Parry M.Div.

## 2023-01-03 LAB
BACTERIA SPEC CULT: ABNORMAL
BACTERIA SPEC CULT: ABNORMAL
SERVICE CMNT-IMP: ABNORMAL

## 2023-01-05 LAB
1,3 BETA GLUCAN SER-MCNC: <31 PG/ML
BACTERIA SPEC CULT: NORMAL
SERVICE CMNT-IMP: NORMAL

## 2023-01-10 NOTE — PROGRESS NOTES
Physician Progress Note      Harika Orellana  [facility] #:                  [id number]  :                       1935  ADMIT DATE:       2022 4:56 PM  [address] [place] DATE:        2022 6:51 PM  RESPONDING  PROVIDER #:        Nitza Phelps MD          QUERY TEXT:    Patient admitted with acute respiratory failure. Noted documentation of sepsis   and septic shock in the medical record. In order to support the diagnosis of   sepsis, please include additional clinical indicators in your documentation. Or please document if the diagnosis of sepsis has been ruled out after further   study    The medical record reflects the following:  Risk Factors: Admitted for acute respiratory failure that required BIPAP and   ICU admission. Cause of death felt to be Acute hypoxemic and hypercapneic   respiratory failure secondary to connective tissue disease interstitial lung   disease. \"? Pulmonology note stated, \"Septic Shock: fever last night. Get blood   and urine cultures. \" No clear infection was documented. Urine cultures did   return positive for enterococcus faecium and candida albicans. Clinical Indicators: Patient did not have significant hypotension that   required pressor support. Fever 100.8. WBCs 14.4  Treatment: BiPAP and ICU admission, cefepime  Options provided:  -- Sepsis present with septic shock as evidenced by, Please document evidence. -- Sepsis present without septic shock present on admission  -- Sepsis was ruled out after study  -- Other - I will add my own diagnosis  -- Disagree - Not applicable / Not valid  -- Disagree - Clinically unable to determine / Unknown  -- Refer to Clinical Documentation Reviewer    PROVIDER RESPONSE TEXT:    Sepsis without septic shock present on admission.     Query created by: Jayy Willams on 1/10/2023 10:48 AM      Electronically signed by:  Nitza Phelps MD 1/10/2023 10:52 AM

## 2024-04-09 NOTE — PROGRESS NOTES
Patient received to [address] [place] [address] room # 7  Ambulatory from [facility]. Patient scheduled for LOOP today with Dr Jacqueline Wood. Procedure reviewed & questions answered, voiced good understanding consent obtained & placed on chart. All medications and medical history reviewed. Will prep patient per orders. Patient & family updated on plan of care. The patient has a fraility score of 3-MANAGING WELL, based on ability to perform ADLS by self. PAIN SCALE 7 OF 10.

## (undated) DEVICE — SUTURE VCRL UD BR CT 3-0 18IN CT1 J838D

## (undated) DEVICE — MICROPUNCTURE INTRODUCER SET SILHOUETTE TRANSITIONLESS WITH NITINOL WIRE GUIDE: Brand: MICROPUNCTURE

## (undated) DEVICE — Device

## (undated) DEVICE — PLASMABLADE X PS210-030S-LIGHT 3.0SL: Brand: PLASMABLADE™ X

## (undated) DEVICE — DRESSING POSTOP AG PRISMASEAL 3.5X6IN

## (undated) DEVICE — 18G NG KIT WITH 96IN PROBE COVER (10 PK): Brand: SITE-RITE

## (undated) DEVICE — INTRODUCER LD L13CM OD6FR SPLITTABLE DIL W/ J TIP GWIRE SYR

## (undated) DEVICE — SUTURE TICRON SZ 0 L36IN NONABSORBABLE BLU CV 300 L35MM 1 2 8886339361

## (undated) DEVICE — SUTURE VCRL + SZ 2-0 L18IN ABSRB UD CT1 L36MM 1/2 CIR VCP839D

## (undated) DEVICE — DRSG POSTOP PRMSL AG 3.5X4IN